# Patient Record
Sex: MALE | Race: ASIAN | Employment: FULL TIME | URBAN - METROPOLITAN AREA
[De-identification: names, ages, dates, MRNs, and addresses within clinical notes are randomized per-mention and may not be internally consistent; named-entity substitution may affect disease eponyms.]

---

## 2018-05-08 PROBLEM — R73.01 IMPAIRED FASTING GLUCOSE: Status: ACTIVE | Noted: 2018-05-08

## 2018-05-08 PROBLEM — I10 ESSENTIAL HYPERTENSION: Status: ACTIVE | Noted: 2018-05-08

## 2018-05-08 PROBLEM — E66.3 OVERWEIGHT (BMI 25.0-29.9): Status: ACTIVE | Noted: 2018-05-08

## 2018-05-08 PROBLEM — E78.2 MIXED HYPERLIPIDEMIA: Status: ACTIVE | Noted: 2018-05-08

## 2018-05-08 PROBLEM — I49.1 PAC (PREMATURE ATRIAL CONTRACTION): Status: ACTIVE | Noted: 2018-05-08

## 2020-07-23 ENCOUNTER — HOSPITAL ENCOUNTER (OUTPATIENT)
Dept: LAB | Age: 65
Discharge: HOME | End: 2020-07-23

## 2020-07-23 PROCEDURE — 88312 SPECIAL STAINS GROUP 1: CPT

## 2020-07-23 PROCEDURE — 88305 TISSUE EXAM BY PATHOLOGIST: CPT

## 2020-08-12 PROBLEM — K26.9 DUODENAL ULCER: Status: ACTIVE | Noted: 2020-08-12

## 2020-08-12 PROBLEM — K22.70 BARRETT'S ESOPHAGUS WITHOUT DYSPLASIA: Status: ACTIVE | Noted: 2020-08-12

## 2020-08-17 ENCOUNTER — HOSPITAL ENCOUNTER (OUTPATIENT)
Dept: CT IMAGING | Age: 65
Discharge: HOME | End: 2020-08-17
Attending: INTERNAL MEDICINE

## 2020-08-17 DIAGNOSIS — R10.10 PAIN OF UPPER ABDOMEN: ICD-10-CM

## 2020-08-17 RX ORDER — SODIUM CHLORIDE 0.9 % (FLUSH) 0.9 %
10 SYRINGE (ML) INJECTION
Status: COMPLETED | OUTPATIENT
Start: 2020-08-17 | End: 2020-08-17

## 2020-08-17 RX ADMIN — Medication 10 ML: at 14:17

## 2020-08-17 NOTE — PROGRESS NOTES
I spoke with Mr Tova Mcgregor and discussed the lesion in head of pancreas and the areas in liver also-he sees Dr Porsche Marie this Thursday for follow up of the ulcer, but this will now take precedence. He likely will get endoscopic US to biopsy the lesion and this was discussed-will fax the CT to Dr Porsche Marie and speak to his nurse or him about the findings. Also spoke of likely need for oncology involvement.

## 2020-08-24 ENCOUNTER — HOSPITAL ENCOUNTER (OUTPATIENT)
Dept: LAB | Age: 65
Discharge: HOME | End: 2020-08-24
Payer: COMMERCIAL

## 2020-08-24 PROCEDURE — 87635 SARS-COV-2 COVID-19 AMP PRB: CPT

## 2020-08-24 NOTE — PROGRESS NOTES
Pt contacted to ask if he received COVID swab pre-procedure. Pt unable to talk at this time, wife able to take message. Wife stated she could bring  to [facility] patient discharge to receive COVID swab at [phone].

## 2020-08-25 ENCOUNTER — ANESTHESIA EVENT (OUTPATIENT)
Dept: ENDOSCOPY | Age: 65
End: 2020-08-25
Payer: COMMERCIAL

## 2020-08-25 ENCOUNTER — HOSPITAL ENCOUNTER (OUTPATIENT)
Age: 65
Setting detail: OUTPATIENT SURGERY
Discharge: HOME | End: 2020-08-25
Attending: INTERNAL MEDICINE | Admitting: INTERNAL MEDICINE
Payer: COMMERCIAL

## 2020-08-25 ENCOUNTER — ANESTHESIA (OUTPATIENT)
Dept: ENDOSCOPY | Age: 65
End: 2020-08-25
Payer: COMMERCIAL

## 2020-08-25 VITALS
RESPIRATION RATE: 14 BRPM | TEMPERATURE: 97.7 F | BODY MASS INDEX: 24.91 KG/M2 | WEIGHT: 155 LBS | DIASTOLIC BLOOD PRESSURE: 72 MMHG | OXYGEN SATURATION: 100 % | HEIGHT: 66 IN | HEART RATE: 70 BPM | SYSTOLIC BLOOD PRESSURE: 162 MMHG

## 2020-08-25 LAB
COVID-19 RAPID TEST, COVR: NOT DETECTED
SARS COV-2, XPGCVT: NEGATIVE
SOURCE, COVRS: NORMAL

## 2020-08-25 PROCEDURE — 76040000026: Performed by: INTERNAL MEDICINE

## 2020-08-25 PROCEDURE — 88307 TISSUE EXAM BY PATHOLOGIST: CPT

## 2020-08-25 PROCEDURE — 76060000032 HC ANESTHESIA 0.5 TO 1 HR: Performed by: INTERNAL MEDICINE

## 2020-08-25 PROCEDURE — 77030008969: Performed by: INTERNAL MEDICINE

## 2020-08-25 PROCEDURE — 77030021593 HC FCPS BIOP ENDOSC BSC -A: Performed by: INTERNAL MEDICINE

## 2020-08-25 PROCEDURE — 74011000250 HC RX REV CODE- 250: Performed by: NURSE ANESTHETIST, CERTIFIED REGISTERED

## 2020-08-25 PROCEDURE — 77030028690 HC NDL ASPIR ULTRSND BSC -E: Performed by: INTERNAL MEDICINE

## 2020-08-25 PROCEDURE — 74011250636 HC RX REV CODE- 250/636: Performed by: ANESTHESIOLOGY

## 2020-08-25 PROCEDURE — 88305 TISSUE EXAM BY PATHOLOGIST: CPT

## 2020-08-25 PROCEDURE — 74011250636 HC RX REV CODE- 250/636: Performed by: NURSE ANESTHETIST, CERTIFIED REGISTERED

## 2020-08-25 RX ORDER — PROPOFOL 10 MG/ML
INJECTION, EMULSION INTRAVENOUS
Status: DISCONTINUED | OUTPATIENT
Start: 2020-08-25 | End: 2020-08-25 | Stop reason: HOSPADM

## 2020-08-25 RX ORDER — LIDOCAINE HYDROCHLORIDE 20 MG/ML
INJECTION, SOLUTION EPIDURAL; INFILTRATION; INTRACAUDAL; PERINEURAL AS NEEDED
Status: DISCONTINUED | OUTPATIENT
Start: 2020-08-25 | End: 2020-08-25 | Stop reason: HOSPADM

## 2020-08-25 RX ORDER — SODIUM CHLORIDE 0.9 % (FLUSH) 0.9 %
5-40 SYRINGE (ML) INJECTION EVERY 8 HOURS
Status: DISCONTINUED | OUTPATIENT
Start: 2020-08-25 | End: 2020-08-25 | Stop reason: HOSPADM

## 2020-08-25 RX ORDER — SODIUM CHLORIDE, SODIUM LACTATE, POTASSIUM CHLORIDE, CALCIUM CHLORIDE 600; 310; 30; 20 MG/100ML; MG/100ML; MG/100ML; MG/100ML
100 INJECTION, SOLUTION INTRAVENOUS CONTINUOUS
Status: DISCONTINUED | OUTPATIENT
Start: 2020-08-25 | End: 2020-08-25 | Stop reason: HOSPADM

## 2020-08-25 RX ORDER — SODIUM CHLORIDE 0.9 % (FLUSH) 0.9 %
5-40 SYRINGE (ML) INJECTION AS NEEDED
Status: DISCONTINUED | OUTPATIENT
Start: 2020-08-25 | End: 2020-08-25 | Stop reason: HOSPADM

## 2020-08-25 RX ORDER — PROPOFOL 10 MG/ML
INJECTION, EMULSION INTRAVENOUS AS NEEDED
Status: DISCONTINUED | OUTPATIENT
Start: 2020-08-25 | End: 2020-08-25 | Stop reason: HOSPADM

## 2020-08-25 RX ADMIN — PHENYLEPHRINE HYDROCHLORIDE 50 MCG: 10 INJECTION INTRAVENOUS at 15:39

## 2020-08-25 RX ADMIN — PROPOFOL 50 MG: 10 INJECTION, EMULSION INTRAVENOUS at 15:02

## 2020-08-25 RX ADMIN — PROPOFOL 100 MCG/KG/MIN: 10 INJECTION, EMULSION INTRAVENOUS at 15:02

## 2020-08-25 RX ADMIN — SODIUM CHLORIDE, SODIUM LACTATE, POTASSIUM CHLORIDE, AND CALCIUM CHLORIDE 100 ML/HR: 600; 310; 30; 20 INJECTION, SOLUTION INTRAVENOUS at 14:00

## 2020-08-25 RX ADMIN — LIDOCAINE HYDROCHLORIDE 60 MG: 20 INJECTION, SOLUTION EPIDURAL; INFILTRATION; INTRACAUDAL; PERINEURAL at 15:02

## 2020-08-25 RX ADMIN — PROPOFOL 50 MG: 10 INJECTION, EMULSION INTRAVENOUS at 15:08

## 2020-08-25 NOTE — ROUTINE PROCESS
VSS. No complaints noted. Education given and reviewed with wife who voiced understanding. Pt wheeled out via wheelchair by Keanu Colon.

## 2020-08-25 NOTE — H&P
Gastroenterology Outpatient History and Physical    Patient: Kirstie Hugo    Physician: Charles Pryor MD    Chief Complaint: Pancreatic mass    History of Present Illness: Epigastric pain    Justification for Procedure: As above    History:  Past Medical History:   Diagnosis Date    Hernia       Past Surgical History:   Procedure Laterality Date    HX CATARACT REMOVAL Left 12/15/2017    HX HERNIA REPAIR  2007    HX ORTHOPAEDIC  1982    broken bone      Social History     Socioeconomic History    Marital status:      Spouse name: Not on file    Number of children: Not on file    Years of education: Not on file    Highest education level: Not on file   Tobacco Use    Smoking status: Former Smoker     Packs/day: 1.00     Years: 10.00     Pack years: 10.00     Last attempt to quit: 2006     Years since quittin.6    Smokeless tobacco: Never Used   Substance and Sexual Activity    Alcohol use: Yes     Comment: occas      Family History   Problem Relation Age of Onset    No Known Problems Mother     Cancer Father 72         leukemia    Diabetes Sister     No Known Problems Brother     No Known Problems Sister     Cancer Sister        Allergies: Allergies   Allergen Reactions    Tetanus And Diphtheria Toxoids Swelling     As child       Medications:   Prior to Admission medications    Medication Sig Start Date End Date Taking? Authorizing Provider   lisinopriL (PRINIVIL, ZESTRIL) 10 mg tablet Take 1 Tab by mouth daily. 20  Yes Nadine Treadwell MD   sucralfate (CARAFATE) 1 gram tablet Take 1 g by mouth four (4) times daily. 20  Yes Provider, Historical   pantoprazole (PROTONIX) 40 mg tablet Take 40 mg by mouth two (2) times a day. 20  Yes Provider, Historical   vit B Cmplx 3-FA-Vit C-Biotin (NEPHRO VERÓNICA RX) 1- mg-mg-mcg tablet Take 1 Tab by mouth daily.    Yes Provider, Historical   cholecalciferol (VITAMIN D3) 1,000 unit tablet Take 1,000 Units by mouth daily. Yes Provider, Historical       Vital Signs: Blood pressure 183/88, pulse 90, temperature 98.3 °F (36.8 °C), resp. rate 14, height 5' 6\" (1.676 m), weight 70.3 kg (155 lb), SpO2 100 %.     Physical Exam:   General: alert      Heart: regular rate and rhythm   Lungs: no tachypnea, retractions or cyanosis, Heart exam - S1, S2 normal, no murmur, no gallop, rate regular   Abdominal: Bowel sounds are normal, soft, non distended             Plan of Care/Planned Procedure: EUS    Signed:  Carie Cleveland MD 8/25/2020

## 2020-08-25 NOTE — ANESTHESIA PREPROCEDURE EVALUATION
Relevant Problems   No relevant active problems       Anesthetic History   No history of anesthetic complications            Review of Systems / Medical History  Patient summary reviewed and pertinent labs reviewed    Pulmonary              Pertinent negatives: No smoker (quit 2006)     Neuro/Psych              Cardiovascular    Hypertension              Exercise tolerance: >4 METS  Comments: occ PACs   GI/Hepatic/Renal               Comments: Abdominal pain Endo/Other             Other Findings              Physical Exam    Airway  Mallampati: I  TM Distance: 4 - 6 cm  Neck ROM: normal range of motion   Mouth opening: Normal     Cardiovascular    Rhythm: regular  Rate: normal         Dental    Dentition: Poor dentition     Pulmonary  Breath sounds clear to auscultation               Abdominal         Other Findings            Anesthetic Plan    ASA: 2  Anesthesia type: total IV anesthesia          Induction: Intravenous  Anesthetic plan and risks discussed with: Patient and Spouse

## 2020-08-25 NOTE — PROCEDURES
ENDOSCOPIC ULTRASOUND PROCEDURE NOTE    DATE OF PROCEDURE: 8/25/2020    PRE-OP DIAGNOSIS:  Pancreatic mass  Epigastric abdominal pain   Weight loss     POST-OP DIAGNOSIS:  Heterogeneous mass involving the head of the pancreas and the duodenum. T3N1M1 if pancreatic cancer confirmed    MEDICATIONS ADMINISTERED: MAC    INSTRUMENT: GFUC T180    PROCEDURE:  After obtaining informed consent, the patient was placed in the left lateral position and sedated. The echoendoscope was advanced to the upper GI tract without difficulty. The scope was slowly removed while detailed images of the adjacent organs were obtained. The patient was taken to the recovery area in stable condition. FINDINGS:  ESOPHAGUS: Not well visualized  STOMACH: Limited views with the oblique-viewing echoendoscope were unremarkable  DUODENUM: There is a friable ulcerated mass in the distal bulb. This involves more than 3/4 of the circumference of the duodenum. It creates is tight stricture, and the large caliber EUS scope is not able to pass through. The appearance suggests malignancy, primary duodenal versus extension of a pancreatic head cancer. Multiple biopsies obtained with cold forceps. PANCREAS: The pancreas was well visualized In the body and tail, but only limited views of the head of the pancreas are available from the gastric antrum and pyloric channel. The scope cannot be advanced further into the duodenum because of the stricture. The ampulla Cannot be visualized. There is a heterogeneous hypoechoic mass with irregular outer margins involving the head of the pancreas. It measures 41 mm in cross section. There is a prominent central prefer echoic area. It is inseparable from the duodenal wall. It does not directly involve the portal vein or celiac axis. The SMA cannot be fully visualized. FNA was performed from a transgastric approach at the pylorus.  2 passes were made and a large amount of tissue was obtained using a RADHA Roberts 1 scientific acquire 22-gauge needle. Sent in formalin. BILIARY SYSTEM:  The gallbladder is normal. Common bile duct is not well visualized from the stomach. OTHER ORGANS: There was no ascites in the upper abdomen. Limited views of the left lobe of the liver demonstrated Multiple prominent solid mass lesions, Consistent with metastases. There is also a large simple cyst near the central portion of the liver. The solid metastases were targeted for FNA using a separate acquire 22-gauge needle. The largest mass visible was 16 mm, round, hypoechoic with a slightly hyperechoic center, typical of metastatic disease. Tissue was sent in formalin. The celiac axis appears normal.  There was significant portal and lai-pancreatic lymphadenopathy. A lymph node adjacent to the head of the pancreas measures 18 x 16 mm. It is oval, homogeneous, with well defined margins. The appearance is atypical for metastatic involvement.      Estimated blood loss: 0-minimal     PLAN:  Follow up mucosal biopsy and FNA results To help differentiate pancreatic versus duodenal primary   No NSAIDs for 48 hours   Oncology evaluation     Selene Mcguire MD  [facility]

## 2020-08-25 NOTE — DISCHARGE INSTRUCTIONS
Gastrointestinal Esophagogastroduodenoscopy (EGD/EUS) - Upper Exam Discharge Instructions    1. Call Dr. Saini Mems at [phone] for any problems or questions. 2. Contact the doctor's office for follow up appointment as directed. 3. Medication may cause drowsiness for several hours, therefore:  · Do not drive or operate machinery for remainder of the day. · No alcohol today. · Do not make any important or legal decisions for 24 hours. · Do not sign any legal documents for 24 hours. 5. Ordinarily, you may resume regular diet and activity after exam unless otherwise specified by your physician. 6. For mild soreness in your throat you may use Cepacol throat lozenges or warm salt-water gargles as needed. Any additional instructions:   PLAN:  Follow up biopsy results (office to call with results)  No NSAIDs (advil, aleve, ibuprofen, etc.) for 48 hours   Oncology evaluation      Instructions given to [employer] and other family members.

## 2020-08-26 NOTE — ANESTHESIA POSTPROCEDURE EVALUATION
Procedure(s):  ENDOSCOPIC ULTRASOUND (EUS)/ BMI 25 Rapid resulted  FINE NEEDLE ASPIRATION  ESOPHAGOGASTRODUODENAL (EGD) BIOPSY. total IV anesthesia    Anesthesia Post Evaluation        Patient location during evaluation: PACU  Patient participation: complete - patient participated  Level of consciousness: awake  Pain management: adequate  Airway patency: patent  Anesthetic complications: no  Cardiovascular status: acceptable  Respiratory status: acceptable  Hydration status: acceptable  Post anesthesia nausea and vomiting:  none      INITIAL Post-op Vital signs:   Vitals Value Taken Time   /72 8/25/2020  4:32 PM   Temp 36.5 °C (97.7 °F) 8/25/2020  3:55 PM   Pulse 73 8/25/2020  4:41 PM   Resp 14 8/25/2020  4:32 PM   SpO2 100 % 8/25/2020  4:41 PM   Vitals shown include unvalidated device data.

## 2020-08-31 ENCOUNTER — HOSPITAL ENCOUNTER (OUTPATIENT)
Dept: LAB | Age: 65
Discharge: HOME | End: 2020-08-31
Payer: COMMERCIAL

## 2020-08-31 ENCOUNTER — PATIENT OUTREACH (OUTPATIENT)
Dept: CASE MANAGEMENT | Age: 65
End: 2020-08-31

## 2020-08-31 DIAGNOSIS — K86.89 PANCREATIC MASS: ICD-10-CM

## 2020-08-31 DIAGNOSIS — C25.0 MALIGNANT NEOPLASM OF HEAD OF PANCREAS (HCC): ICD-10-CM

## 2020-08-31 DIAGNOSIS — Z01.812 PRE-PROCEDURAL LABORATORY EXAMINATION: ICD-10-CM

## 2020-08-31 LAB
ALBUMIN SERPL-MCNC: 3.4 G/DL (ref 3.2–4.6)
ALBUMIN/GLOB SERPL: 0.8 {RATIO} (ref 1.2–3.5)
ALP SERPL-CCNC: 92 U/L (ref 50–136)
ALT SERPL-CCNC: 19 U/L (ref 12–65)
ANION GAP SERPL CALC-SCNC: 5 MMOL/L (ref 7–16)
APTT PPP: 33.2 SEC (ref 24.3–35.4)
AST SERPL-CCNC: 15 U/L (ref 15–37)
BASOPHILS # BLD: 0.1 K/UL (ref 0–0.2)
BASOPHILS NFR BLD: 1 % (ref 0–2)
BILIRUB SERPL-MCNC: 0.3 MG/DL (ref 0.2–1.1)
BUN SERPL-MCNC: 7 MG/DL (ref 8–23)
CALCIUM SERPL-MCNC: 9.6 MG/DL (ref 8.3–10.4)
CANCER AG19-9 SERPL-ACNC: ABNORMAL U/ML (ref 2–37)
CHLORIDE SERPL-SCNC: 104 MMOL/L (ref 98–107)
CO2 SERPL-SCNC: 27 MMOL/L (ref 21–32)
CREAT SERPL-MCNC: 1.1 MG/DL (ref 0.8–1.5)
DIFFERENTIAL METHOD BLD: ABNORMAL
EOSINOPHIL # BLD: 1 K/UL (ref 0–0.8)
EOSINOPHIL NFR BLD: 8 % (ref 0.5–7.8)
ERYTHROCYTE [DISTWIDTH] IN BLOOD BY AUTOMATED COUNT: 14.7 % (ref 11.9–14.6)
GLOBULIN SER CALC-MCNC: 4.2 G/DL (ref 2.3–3.5)
GLUCOSE SERPL-MCNC: 96 MG/DL (ref 65–100)
HCT VFR BLD AUTO: 29.2 % (ref 41.1–50.3)
HGB BLD-MCNC: 9 G/DL (ref 13.6–17.2)
IMM GRANULOCYTES # BLD AUTO: 0.1 K/UL (ref 0–0.5)
IMM GRANULOCYTES NFR BLD AUTO: 1 % (ref 0–5)
INR PPP: 1.1
LYMPHOCYTES # BLD: 0.8 K/UL (ref 0.5–4.6)
LYMPHOCYTES NFR BLD: 6 % (ref 13–44)
MCH RBC QN AUTO: 26 PG (ref 26.1–32.9)
MCHC RBC AUTO-ENTMCNC: 30.8 G/DL (ref 31.4–35)
MCV RBC AUTO: 84.4 FL (ref 79.6–97.8)
MONOCYTES # BLD: 1.2 K/UL (ref 0.1–1.3)
MONOCYTES NFR BLD: 10 % (ref 4–12)
NEUTS SEG # BLD: 9.4 K/UL (ref 1.7–8.2)
NEUTS SEG NFR BLD: 75 % (ref 43–78)
NRBC # BLD: 0 K/UL (ref 0–0.2)
PLATELET # BLD AUTO: 401 K/UL (ref 150–450)
PMV BLD AUTO: 9.3 FL (ref 9.4–12.3)
POTASSIUM SERPL-SCNC: 4 MMOL/L (ref 3.5–5.1)
PROT SERPL-MCNC: 7.6 G/DL (ref 6.3–8.2)
PROTHROMBIN TIME: 14.2 SEC (ref 12–14.7)
RBC # BLD AUTO: 3.46 M/UL (ref 4.23–5.67)
SODIUM SERPL-SCNC: 136 MMOL/L (ref 136–145)
WBC # BLD AUTO: 12.5 K/UL (ref 4.3–11.1)

## 2020-08-31 PROCEDURE — 85610 PROTHROMBIN TIME: CPT

## 2020-08-31 PROCEDURE — 86301 IMMUNOASSAY TUMOR CA 19-9: CPT

## 2020-08-31 PROCEDURE — 83540 ASSAY OF IRON: CPT

## 2020-08-31 PROCEDURE — 85025 COMPLETE CBC W/AUTO DIFF WBC: CPT

## 2020-08-31 PROCEDURE — 85730 THROMBOPLASTIN TIME PARTIAL: CPT

## 2020-08-31 PROCEDURE — 82728 ASSAY OF FERRITIN: CPT

## 2020-08-31 PROCEDURE — 80053 COMPREHEN METABOLIC PANEL: CPT

## 2020-08-31 PROCEDURE — 36415 COLL VENOUS BLD VENIPUNCTURE: CPT

## 2020-08-31 NOTE — PROGRESS NOTES
8/31/20 saw pt today with Dr. Rufino Watkins for initial medical oncology consult for pancreatic cancer. His wife is here with him today. Imaging reviewed. NGS Caris testing will be ordered. CT chest to complete initial work up. Recommendation is systemic treatment, will arrange port placement. Port education book reviewed. Rx sent to pharmacy and explained to pt. Ultram controlling pain. Labs today. Provided opportunity to ask questions and all were discussed. My contact information was provided and I encouraged him to call with any questions or concerns. Navigation will continue to follow.

## 2020-09-01 ENCOUNTER — HOSPITAL ENCOUNTER (OUTPATIENT)
Dept: SURGERY | Age: 65
Discharge: HOME | End: 2020-09-01

## 2020-09-01 PROBLEM — D50.9 IRON DEFICIENCY ANEMIA: Status: ACTIVE | Noted: 2020-09-01

## 2020-09-01 LAB
FERRITIN SERPL-MCNC: 20 NG/ML (ref 8–388)
IRON SATN MFR SERPL: 3 %
IRON SERPL-MCNC: 12 UG/DL (ref 35–150)
TIBC SERPL-MCNC: 367 UG/DL (ref 250–450)

## 2020-09-02 ENCOUNTER — APPOINTMENT (OUTPATIENT)
Dept: GENERAL RADIOLOGY | Age: 65
DRG: 357 | End: 2020-09-02
Attending: EMERGENCY MEDICINE
Payer: COMMERCIAL

## 2020-09-02 ENCOUNTER — HOSPITAL ENCOUNTER (INPATIENT)
Age: 65
LOS: 4 days | Discharge: HOME | DRG: 357 | End: 2020-09-06
Attending: EMERGENCY MEDICINE | Admitting: INTERNAL MEDICINE
Payer: COMMERCIAL

## 2020-09-02 VITALS — WEIGHT: 155 LBS | HEIGHT: 66 IN | BODY MASS INDEX: 24.91 KG/M2

## 2020-09-02 DIAGNOSIS — C25.0 MALIGNANT NEOPLASM OF HEAD OF PANCREAS (HCC): ICD-10-CM

## 2020-09-02 DIAGNOSIS — K31.89 DUODENAL MASS: ICD-10-CM

## 2020-09-02 DIAGNOSIS — K92.0 GASTROINTESTINAL HEMORRHAGE WITH HEMATEMESIS: ICD-10-CM

## 2020-09-02 DIAGNOSIS — D64.9 SYMPTOMATIC ANEMIA: ICD-10-CM

## 2020-09-02 DIAGNOSIS — K92.2 ACUTE UPPER GI BLEED: Primary | ICD-10-CM

## 2020-09-02 PROBLEM — I95.9 HYPOTENSION: Status: ACTIVE | Noted: 2020-09-02

## 2020-09-02 PROBLEM — C25.9 PANCREATIC CANCER (HCC): Status: ACTIVE | Noted: 2020-09-02

## 2020-09-02 PROBLEM — D62 ACUTE BLOOD LOSS ANEMIA: Status: ACTIVE | Noted: 2020-09-02

## 2020-09-02 LAB
ALBUMIN SERPL-MCNC: 2.5 G/DL (ref 3.2–4.6)
ALBUMIN/GLOB SERPL: 0.8 {RATIO} (ref 1.2–3.5)
ALP SERPL-CCNC: 71 U/L (ref 50–136)
ALT SERPL-CCNC: 13 U/L (ref 12–65)
ANION GAP SERPL CALC-SCNC: 13 MMOL/L (ref 7–16)
AST SERPL-CCNC: 19 U/L (ref 15–37)
BASOPHILS # BLD: 0.1 K/UL (ref 0–0.2)
BASOPHILS NFR BLD: 1 % (ref 0–2)
BILIRUB SERPL-MCNC: 0.2 MG/DL (ref 0.2–1.1)
BUN SERPL-MCNC: 15 MG/DL (ref 8–23)
CALCIUM SERPL-MCNC: 8.4 MG/DL (ref 8.3–10.4)
CHLORIDE SERPL-SCNC: 104 MMOL/L (ref 98–107)
CO2 SERPL-SCNC: 22 MMOL/L (ref 21–32)
CREAT SERPL-MCNC: 1.21 MG/DL (ref 0.8–1.5)
DIFFERENTIAL METHOD BLD: ABNORMAL
EOSINOPHIL # BLD: 1.4 K/UL (ref 0–0.8)
EOSINOPHIL NFR BLD: 7 % (ref 0.5–7.8)
ERYTHROCYTE [DISTWIDTH] IN BLOOD BY AUTOMATED COUNT: 15.4 % (ref 11.9–14.6)
GLOBULIN SER CALC-MCNC: 3.2 G/DL (ref 2.3–3.5)
GLUCOSE SERPL-MCNC: 187 MG/DL (ref 65–100)
HCT VFR BLD AUTO: 20.6 % (ref 41.1–50.3)
HGB BLD-MCNC: 6.3 G/DL (ref 13.6–17.2)
IMM GRANULOCYTES # BLD AUTO: 0.3 K/UL (ref 0–0.5)
IMM GRANULOCYTES NFR BLD AUTO: 1 % (ref 0–5)
INR PPP: 1.2
LIPASE SERPL-CCNC: 504 U/L (ref 73–393)
LYMPHOCYTES # BLD: 1.2 K/UL (ref 0.5–4.6)
LYMPHOCYTES NFR BLD: 6 % (ref 13–44)
MAGNESIUM SERPL-MCNC: 1.9 MG/DL (ref 1.8–2.4)
MCH RBC QN AUTO: 26 PG (ref 26.1–32.9)
MCHC RBC AUTO-ENTMCNC: 30.6 G/DL (ref 31.4–35)
MCV RBC AUTO: 85.1 FL (ref 79.6–97.8)
MONOCYTES # BLD: 1.6 K/UL (ref 0.1–1.3)
MONOCYTES NFR BLD: 9 % (ref 4–12)
NEUTS SEG # BLD: 14.8 K/UL (ref 1.7–8.2)
NEUTS SEG NFR BLD: 76 % (ref 43–78)
NRBC # BLD: 0 K/UL (ref 0–0.2)
PLATELET # BLD AUTO: 366 K/UL (ref 150–450)
PMV BLD AUTO: 10.2 FL (ref 9.4–12.3)
POTASSIUM SERPL-SCNC: 3.9 MMOL/L (ref 3.5–5.1)
PROT SERPL-MCNC: 5.7 G/DL (ref 6.3–8.2)
PROTHROMBIN TIME: 15.1 SEC (ref 12–14.7)
RBC # BLD AUTO: 2.42 M/UL (ref 4.23–5.6)
SODIUM SERPL-SCNC: 139 MMOL/L (ref 136–145)
WBC # BLD AUTO: 19.3 K/UL (ref 4.3–11.1)

## 2020-09-02 PROCEDURE — 85025 COMPLETE CBC W/AUTO DIFF WBC: CPT

## 2020-09-02 PROCEDURE — 83735 ASSAY OF MAGNESIUM: CPT

## 2020-09-02 PROCEDURE — 96376 TX/PRO/DX INJ SAME DRUG ADON: CPT

## 2020-09-02 PROCEDURE — 86900 BLOOD TYPING SEROLOGIC ABO: CPT

## 2020-09-02 PROCEDURE — P9016 RBC LEUKOCYTES REDUCED: HCPCS

## 2020-09-02 PROCEDURE — 74011000250 HC RX REV CODE- 250: Performed by: EMERGENCY MEDICINE

## 2020-09-02 PROCEDURE — C9113 INJ PANTOPRAZOLE SODIUM, VIA: HCPCS | Performed by: EMERGENCY MEDICINE

## 2020-09-02 PROCEDURE — 65610000006 HC RM INTENSIVE CARE

## 2020-09-02 PROCEDURE — 83690 ASSAY OF LIPASE: CPT

## 2020-09-02 PROCEDURE — 96361 HYDRATE IV INFUSION ADD-ON: CPT

## 2020-09-02 PROCEDURE — 96375 TX/PRO/DX INJ NEW DRUG ADDON: CPT

## 2020-09-02 PROCEDURE — 86923 COMPATIBILITY TEST ELECTRIC: CPT

## 2020-09-02 PROCEDURE — 74011250636 HC RX REV CODE- 250/636: Performed by: EMERGENCY MEDICINE

## 2020-09-02 PROCEDURE — 96374 THER/PROPH/DIAG INJ IV PUSH: CPT

## 2020-09-02 PROCEDURE — 99285 EMERGENCY DEPT VISIT HI MDM: CPT

## 2020-09-02 PROCEDURE — 36430 TRANSFUSION BLD/BLD COMPNT: CPT

## 2020-09-02 PROCEDURE — 80053 COMPREHEN METABOLIC PANEL: CPT

## 2020-09-02 PROCEDURE — 85610 PROTHROMBIN TIME: CPT

## 2020-09-02 RX ORDER — ONDANSETRON 2 MG/ML
8 INJECTION INTRAMUSCULAR; INTRAVENOUS
Status: COMPLETED | OUTPATIENT
Start: 2020-09-02 | End: 2020-09-02

## 2020-09-02 RX ORDER — FENTANYL CITRATE 50 UG/ML
50 INJECTION, SOLUTION INTRAMUSCULAR; INTRAVENOUS ONCE
Status: COMPLETED | OUTPATIENT
Start: 2020-09-02 | End: 2020-09-02

## 2020-09-02 RX ORDER — OCTREOTIDE ACETATE 100 UG/ML
50 INJECTION, SOLUTION INTRAVENOUS; SUBCUTANEOUS
Status: COMPLETED | OUTPATIENT
Start: 2020-09-02 | End: 2020-09-02

## 2020-09-02 RX ORDER — SODIUM CHLORIDE 9 MG/ML
250 INJECTION, SOLUTION INTRAVENOUS AS NEEDED
Status: DISCONTINUED | OUTPATIENT
Start: 2020-09-02 | End: 2020-09-06 | Stop reason: HOSPADM

## 2020-09-02 RX ADMIN — SODIUM CHLORIDE 1000 ML: 900 INJECTION, SOLUTION INTRAVENOUS at 19:19

## 2020-09-02 RX ADMIN — ONDANSETRON 8 MG: 2 INJECTION INTRAMUSCULAR; INTRAVENOUS at 19:19

## 2020-09-02 RX ADMIN — OCTREOTIDE ACETATE 50 MCG/HR: 500 INJECTION, SOLUTION INTRAVENOUS; SUBCUTANEOUS at 20:39

## 2020-09-02 RX ADMIN — FENTANYL CITRATE 50 MCG: 50 INJECTION INTRAMUSCULAR; INTRAVENOUS at 19:19

## 2020-09-02 RX ADMIN — SODIUM CHLORIDE 1000 ML: 900 INJECTION, SOLUTION INTRAVENOUS at 21:46

## 2020-09-02 RX ADMIN — SODIUM CHLORIDE 40 MG: 9 INJECTION, SOLUTION INTRAMUSCULAR; INTRAVENOUS; SUBCUTANEOUS at 19:26

## 2020-09-02 RX ADMIN — OCTREOTIDE ACETATE 50 MCG: 100 INJECTION, SOLUTION INTRAVENOUS; SUBCUTANEOUS at 20:38

## 2020-09-02 NOTE — PERIOP NOTES
Patient verified name and . Order for consent not found in EHR at time of patand matches case posting; patient verifies procedure. Type 1b surgery, pat assessment complete. Orders not received. Labs per surgeon: none  Labs per anesthesia protocol: none      Patient answered medical/surgical history questions at their best of ability. All prior to admission medications documented in [facility]. Patient instructed to take the following medications the day of surgery according to anesthesia guidelines with a small sip of water:protonix Hold all vitamins 7 days prior to surgery and NSAIDS 5 days prior to surgery. Prescription meds to hold:all supplements    Patient instructed on the following:    > a negative Covid swab result is required to proceed with surgery;  appointments are made by the surgeon office and test should be collected 7 days prior to surgery. The testing center is located at the [facility] [address]. MAC anesthesia  > 1 visitor allowed at this time. >Arrive at outpatient-2cnd floor-radiology Entrance, time of arrival to be called the day before by 1700  >NPO after midnight including gum, mints, and ice chips  >Responsible adult must drive patient to the hospital, stay during surgery, and patient will need supervision 24 hours after anesthesia  >Use dial soap in shower the night before surgery and on the morning of surgery  >All piercings must be removed prior to arrival.    >Leave all valuables (money and jewelry) at home but bring insurance card and ID on       DOS. >Do not wear make-up, nail polish, lotions, cologne, perfumes, powders, or oil on skin.

## 2020-09-02 NOTE — ED TRIAGE NOTES
Pt arrives via ems from home. Reports tarry black stool this morning and one episode of bloody vomit. Reports llq pain for 3 months and recent dx of pancreatic cx. Hypotensive with ems, received 250 cc ns. Pt c/o weakness and abdomen pain.

## 2020-09-03 ENCOUNTER — APPOINTMENT (OUTPATIENT)
Dept: CT IMAGING | Age: 65
DRG: 357 | End: 2020-09-03
Attending: INTERNAL MEDICINE
Payer: COMMERCIAL

## 2020-09-03 ENCOUNTER — APPOINTMENT (OUTPATIENT)
Dept: INTERVENTIONAL RADIOLOGY/VASCULAR | Age: 65
DRG: 357 | End: 2020-09-03
Attending: RADIOLOGY
Payer: COMMERCIAL

## 2020-09-03 LAB
ANION GAP SERPL CALC-SCNC: 6 MMOL/L (ref 7–16)
APPEARANCE UR: CLEAR
BILIRUB UR QL: NEGATIVE
BUN SERPL-MCNC: 17 MG/DL (ref 8–23)
CALCIUM SERPL-MCNC: 8.5 MG/DL (ref 8.3–10.4)
CHLORIDE SERPL-SCNC: 107 MMOL/L (ref 98–107)
CO2 SERPL-SCNC: 26 MMOL/L (ref 21–32)
COLOR UR: YELLOW
CREAT SERPL-MCNC: 1.11 MG/DL (ref 0.8–1.5)
GLUCOSE SERPL-MCNC: 136 MG/DL (ref 65–100)
GLUCOSE UR STRIP.AUTO-MCNC: NEGATIVE MG/DL
HCT VFR BLD AUTO: 22.1 % (ref 41.1–50.3)
HCT VFR BLD AUTO: 25.2 % (ref 41.1–50.3)
HCT VFR BLD AUTO: 27.1 % (ref 41.1–50.3)
HEMOCCULT STL QL: POSITIVE
HGB BLD-MCNC: 7.1 G/DL (ref 13.6–17.2)
HGB BLD-MCNC: 8.5 G/DL (ref 13.6–17.2)
HGB BLD-MCNC: 8.5 G/DL (ref 13.6–17.2)
HGB UR QL STRIP: NEGATIVE
KETONES UR QL STRIP.AUTO: 15 MG/DL
LEUKOCYTE ESTERASE UR QL STRIP.AUTO: NEGATIVE
NITRITE UR QL STRIP.AUTO: NEGATIVE
PH UR STRIP: 6 [PH] (ref 5–9)
POTASSIUM SERPL-SCNC: 4.7 MMOL/L (ref 3.5–5.1)
PROT UR STRIP-MCNC: NEGATIVE MG/DL
SODIUM SERPL-SCNC: 139 MMOL/L (ref 136–145)
SP GR UR REFRACTOMETRY: 1.02 (ref 1–1.02)
UROBILINOGEN UR QL STRIP.AUTO: 0.2 EU/DL (ref 0.2–1)

## 2020-09-03 PROCEDURE — 04L33DZ OCCLUSION OF HEPATIC ARTERY WITH INTRALUMINAL DEVICE, PERCUTANEOUS APPROACH: ICD-10-PCS | Performed by: RADIOLOGY

## 2020-09-03 PROCEDURE — C1769 GUIDE WIRE: HCPCS

## 2020-09-03 PROCEDURE — 77030022017 HC DRSG HEMO QCLOT ZMED -A

## 2020-09-03 PROCEDURE — 74011250636 HC RX REV CODE- 250/636: Performed by: NURSE PRACTITIONER

## 2020-09-03 PROCEDURE — C1887 CATHETER, GUIDING: HCPCS

## 2020-09-03 PROCEDURE — 74011250636 HC RX REV CODE- 250/636: Performed by: INTERNAL MEDICINE

## 2020-09-03 PROCEDURE — 36247 INS CATH ABD/L-EXT ART 3RD: CPT

## 2020-09-03 PROCEDURE — 74011250636 HC RX REV CODE- 250/636: Performed by: RADIOLOGY

## 2020-09-03 PROCEDURE — 76937 US GUIDE VASCULAR ACCESS: CPT

## 2020-09-03 PROCEDURE — 30233N1 TRANSFUSION OF NONAUTOLOGOUS RED BLOOD CELLS INTO PERIPHERAL VEIN, PERCUTANEOUS APPROACH: ICD-10-PCS | Performed by: EMERGENCY MEDICINE

## 2020-09-03 PROCEDURE — 74011000250 HC RX REV CODE- 250: Performed by: EMERGENCY MEDICINE

## 2020-09-03 PROCEDURE — 77030021532 HC CATH ANGI DX IMPRS MRTM -B

## 2020-09-03 PROCEDURE — C1760 CLOSURE DEV, VASC: HCPCS

## 2020-09-03 PROCEDURE — 71260 CT THORAX DX C+: CPT

## 2020-09-03 PROCEDURE — 75726 ARTERY X-RAYS ABDOMEN: CPT

## 2020-09-03 PROCEDURE — P9016 RBC LEUKOCYTES REDUCED: HCPCS

## 2020-09-03 PROCEDURE — 80048 BASIC METABOLIC PNL TOTAL CA: CPT

## 2020-09-03 PROCEDURE — 77030007176 HC COIL EMB INTLOK BSC -E

## 2020-09-03 PROCEDURE — C1894 INTRO/SHEATH, NON-LASER: HCPCS

## 2020-09-03 PROCEDURE — 74011000258 HC RX REV CODE- 258: Performed by: INTERNAL MEDICINE

## 2020-09-03 PROCEDURE — 77030034869 HC COIL INTLCK18 FBR IDC 2D BSC -F

## 2020-09-03 PROCEDURE — 77030004524 HC CATH ANGI DX FLS COOK -B

## 2020-09-03 PROCEDURE — B4141ZZ FLUOROSCOPY OF SUPERIOR MESENTERIC ARTERY USING LOW OSMOLAR CONTRAST: ICD-10-PCS | Performed by: RADIOLOGY

## 2020-09-03 PROCEDURE — 74011250636 HC RX REV CODE- 250/636: Performed by: EMERGENCY MEDICINE

## 2020-09-03 PROCEDURE — 65270000029 HC RM PRIVATE

## 2020-09-03 PROCEDURE — C9113 INJ PANTOPRAZOLE SODIUM, VIA: HCPCS | Performed by: EMERGENCY MEDICINE

## 2020-09-03 PROCEDURE — 74011000636 HC RX REV CODE- 636: Performed by: RADIOLOGY

## 2020-09-03 PROCEDURE — 04L53DZ OCCLUSION OF SUPERIOR MESENTERIC ARTERY WITH INTRALUMINAL DEVICE, PERCUTANEOUS APPROACH: ICD-10-PCS | Performed by: RADIOLOGY

## 2020-09-03 PROCEDURE — 85018 HEMOGLOBIN: CPT

## 2020-09-03 PROCEDURE — 74011000250 HC RX REV CODE- 250: Performed by: RADIOLOGY

## 2020-09-03 PROCEDURE — 77030010507 HC ADH SKN DERMBND J&J -B

## 2020-09-03 PROCEDURE — 82272 OCCULT BLD FECES 1-3 TESTS: CPT

## 2020-09-03 PROCEDURE — 36246 INS CATH ABD/L-EXT ART 2ND: CPT

## 2020-09-03 PROCEDURE — 74011000636 HC RX REV CODE- 636: Performed by: INTERNAL MEDICINE

## 2020-09-03 PROCEDURE — 36415 COLL VENOUS BLD VENIPUNCTURE: CPT

## 2020-09-03 PROCEDURE — 81003 URINALYSIS AUTO W/O SCOPE: CPT

## 2020-09-03 PROCEDURE — 36430 TRANSFUSION BLD/BLD COMPNT: CPT

## 2020-09-03 PROCEDURE — 74011000258 HC RX REV CODE- 258: Performed by: NURSE PRACTITIONER

## 2020-09-03 RX ORDER — DIPHENHYDRAMINE HYDROCHLORIDE 50 MG/ML
12.5-5 INJECTION, SOLUTION INTRAMUSCULAR; INTRAVENOUS ONCE
Status: COMPLETED | OUTPATIENT
Start: 2020-09-03 | End: 2020-09-03

## 2020-09-03 RX ORDER — MIDAZOLAM HYDROCHLORIDE 1 MG/ML
.5-2 INJECTION, SOLUTION INTRAMUSCULAR; INTRAVENOUS
Status: DISCONTINUED | OUTPATIENT
Start: 2020-09-03 | End: 2020-09-03

## 2020-09-03 RX ORDER — ONDANSETRON 2 MG/ML
4 INJECTION INTRAMUSCULAR; INTRAVENOUS
Status: DISCONTINUED | OUTPATIENT
Start: 2020-09-03 | End: 2020-09-06 | Stop reason: HOSPADM

## 2020-09-03 RX ORDER — ACETAMINOPHEN 650 MG/1
650 SUPPOSITORY RECTAL
Status: DISCONTINUED | OUTPATIENT
Start: 2020-09-03 | End: 2020-09-06 | Stop reason: HOSPADM

## 2020-09-03 RX ORDER — SODIUM CHLORIDE 9 MG/ML
25 INJECTION, SOLUTION INTRAVENOUS ONCE
Status: COMPLETED | OUTPATIENT
Start: 2020-09-03 | End: 2020-09-03

## 2020-09-03 RX ORDER — HYDROMORPHONE HYDROCHLORIDE 1 MG/ML
1 INJECTION, SOLUTION INTRAMUSCULAR; INTRAVENOUS; SUBCUTANEOUS
Status: DISCONTINUED | OUTPATIENT
Start: 2020-09-03 | End: 2020-09-06 | Stop reason: HOSPADM

## 2020-09-03 RX ORDER — ENALAPRILAT 1.25 MG/ML
1.25 INJECTION INTRAVENOUS
Status: DISCONTINUED | OUTPATIENT
Start: 2020-09-03 | End: 2020-09-06 | Stop reason: HOSPADM

## 2020-09-03 RX ORDER — LIDOCAINE HYDROCHLORIDE 20 MG/ML
0.2 INJECTION, SOLUTION INFILTRATION; PERINEURAL ONCE
Status: COMPLETED | OUTPATIENT
Start: 2020-09-03 | End: 2020-09-03

## 2020-09-03 RX ORDER — SODIUM CHLORIDE 9 MG/ML
75 INJECTION, SOLUTION INTRAVENOUS CONTINUOUS
Status: DISCONTINUED | OUTPATIENT
Start: 2020-09-03 | End: 2020-09-04

## 2020-09-03 RX ORDER — SODIUM CHLORIDE 0.9 % (FLUSH) 0.9 %
10 SYRINGE (ML) INJECTION
Status: COMPLETED | OUTPATIENT
Start: 2020-09-03 | End: 2020-09-03

## 2020-09-03 RX ORDER — FENTANYL CITRATE 50 UG/ML
25-50 INJECTION, SOLUTION INTRAMUSCULAR; INTRAVENOUS
Status: DISCONTINUED | OUTPATIENT
Start: 2020-09-03 | End: 2020-09-03

## 2020-09-03 RX ORDER — HEPARIN SODIUM 200 [USP'U]/100ML
1000-4000 INJECTION, SOLUTION INTRAVENOUS AS NEEDED
Status: DISCONTINUED | OUTPATIENT
Start: 2020-09-03 | End: 2020-09-03

## 2020-09-03 RX ADMIN — FENTANYL CITRATE 25 MCG: 50 INJECTION, SOLUTION INTRAMUSCULAR; INTRAVENOUS at 09:38

## 2020-09-03 RX ADMIN — FENTANYL CITRATE 25 MCG: 50 INJECTION, SOLUTION INTRAMUSCULAR; INTRAVENOUS at 08:49

## 2020-09-03 RX ADMIN — MIDAZOLAM 0.5 MG: 1 INJECTION INTRAMUSCULAR; INTRAVENOUS at 08:55

## 2020-09-03 RX ADMIN — Medication 10 ML: at 15:17

## 2020-09-03 RX ADMIN — MIDAZOLAM 0.5 MG: 1 INJECTION INTRAMUSCULAR; INTRAVENOUS at 09:31

## 2020-09-03 RX ADMIN — DIPHENHYDRAMINE HYDROCHLORIDE 50 MG: 50 INJECTION, SOLUTION INTRAMUSCULAR; INTRAVENOUS at 08:24

## 2020-09-03 RX ADMIN — FENTANYL CITRATE 25 MCG: 50 INJECTION, SOLUTION INTRAMUSCULAR; INTRAVENOUS at 08:34

## 2020-09-03 RX ADMIN — IOPAMIDOL 200 ML: 612 INJECTION, SOLUTION INTRAVENOUS at 09:54

## 2020-09-03 RX ADMIN — FENTANYL CITRATE 25 MCG: 50 INJECTION, SOLUTION INTRAMUSCULAR; INTRAVENOUS at 09:19

## 2020-09-03 RX ADMIN — HEPARIN SODIUM 2000 UNITS: 200 INJECTION, SOLUTION INTRAVENOUS at 08:47

## 2020-09-03 RX ADMIN — SODIUM CHLORIDE 25 MG: 900 INJECTION, SOLUTION INTRAVENOUS at 17:01

## 2020-09-03 RX ADMIN — OCTREOTIDE ACETATE 50 MCG/HR: 500 INJECTION, SOLUTION INTRAVENOUS; SUBCUTANEOUS at 21:00

## 2020-09-03 RX ADMIN — SODIUM CHLORIDE 25 ML/HR: 900 INJECTION, SOLUTION INTRAVENOUS at 08:20

## 2020-09-03 RX ADMIN — HEPARIN SODIUM 2000 UNITS: 200 INJECTION, SOLUTION INTRAVENOUS at 08:42

## 2020-09-03 RX ADMIN — MIDAZOLAM 0.5 MG: 1 INJECTION INTRAMUSCULAR; INTRAVENOUS at 09:49

## 2020-09-03 RX ADMIN — SODIUM CHLORIDE 1000 MG: 900 INJECTION, SOLUTION INTRAVENOUS at 19:24

## 2020-09-03 RX ADMIN — PANTOPRAZOLE SODIUM 80 MG: 40 INJECTION, POWDER, FOR SOLUTION INTRAVENOUS at 21:00

## 2020-09-03 RX ADMIN — MIDAZOLAM 0.5 MG: 1 INJECTION INTRAMUSCULAR; INTRAVENOUS at 08:35

## 2020-09-03 RX ADMIN — SODIUM CHLORIDE 100 ML: 900 INJECTION, SOLUTION INTRAVENOUS at 15:17

## 2020-09-03 RX ADMIN — FENTANYL CITRATE 25 MCG: 50 INJECTION, SOLUTION INTRAMUSCULAR; INTRAVENOUS at 09:31

## 2020-09-03 RX ADMIN — MIDAZOLAM 1 MG: 1 INJECTION INTRAMUSCULAR; INTRAVENOUS at 08:25

## 2020-09-03 RX ADMIN — FENTANYL CITRATE 50 MCG: 50 INJECTION, SOLUTION INTRAMUSCULAR; INTRAVENOUS at 08:25

## 2020-09-03 RX ADMIN — LIDOCAINE HYDROCHLORIDE 4 MG: 20 INJECTION, SOLUTION INFILTRATION; PERINEURAL at 08:32

## 2020-09-03 RX ADMIN — HEPARIN SODIUM 2000 UNITS: 200 INJECTION, SOLUTION INTRAVENOUS at 08:37

## 2020-09-03 RX ADMIN — MIDAZOLAM 0.5 MG: 1 INJECTION INTRAMUSCULAR; INTRAVENOUS at 09:19

## 2020-09-03 RX ADMIN — MIDAZOLAM 0.5 MG: 1 INJECTION INTRAMUSCULAR; INTRAVENOUS at 09:12

## 2020-09-03 RX ADMIN — HEPARIN SODIUM 2000 UNITS: 200 INJECTION, SOLUTION INTRAVENOUS at 08:32

## 2020-09-03 RX ADMIN — HYDROMORPHONE HYDROCHLORIDE 1 MG: 1 INJECTION, SOLUTION INTRAMUSCULAR; INTRAVENOUS; SUBCUTANEOUS at 21:01

## 2020-09-03 RX ADMIN — MIDAZOLAM 0.5 MG: 1 INJECTION INTRAMUSCULAR; INTRAVENOUS at 08:39

## 2020-09-03 RX ADMIN — PANTOPRAZOLE SODIUM 80 MG: 40 INJECTION, POWDER, FOR SOLUTION INTRAVENOUS at 13:15

## 2020-09-03 RX ADMIN — FENTANYL CITRATE 25 MCG: 50 INJECTION, SOLUTION INTRAMUSCULAR; INTRAVENOUS at 08:56

## 2020-09-03 RX ADMIN — FENTANYL CITRATE 25 MCG: 50 INJECTION, SOLUTION INTRAMUSCULAR; INTRAVENOUS at 09:49

## 2020-09-03 RX ADMIN — FENTANYL CITRATE 25 MCG: 50 INJECTION, SOLUTION INTRAMUSCULAR; INTRAVENOUS at 09:12

## 2020-09-03 RX ADMIN — MIDAZOLAM 0.5 MG: 1 INJECTION INTRAMUSCULAR; INTRAVENOUS at 09:38

## 2020-09-03 RX ADMIN — MIDAZOLAM 0.5 MG: 1 INJECTION INTRAMUSCULAR; INTRAVENOUS at 08:44

## 2020-09-03 RX ADMIN — SODIUM CHLORIDE 75 ML/HR: 9 INJECTION, SOLUTION INTRAVENOUS at 00:45

## 2020-09-03 RX ADMIN — MIDAZOLAM 0.5 MG: 1 INJECTION INTRAMUSCULAR; INTRAVENOUS at 08:49

## 2020-09-03 RX ADMIN — FENTANYL CITRATE 25 MCG: 50 INJECTION, SOLUTION INTRAMUSCULAR; INTRAVENOUS at 08:44

## 2020-09-03 RX ADMIN — IOPAMIDOL 80 ML: 755 INJECTION, SOLUTION INTRAVENOUS at 15:17

## 2020-09-03 RX ADMIN — OCTREOTIDE ACETATE 50 MCG/HR: 500 INJECTION, SOLUTION INTRAVENOUS; SUBCUTANEOUS at 08:09

## 2020-09-03 RX ADMIN — FENTANYL CITRATE 25 MCG: 50 INJECTION, SOLUTION INTRAMUSCULAR; INTRAVENOUS at 08:39

## 2020-09-03 NOTE — ED PROVIDER NOTES
Xiomara Ya [address] Najera is a 60 y.o. male seen on 9/2/2020 in the [facility] EMERGENCY DEPT in room ER06/06. Chief Complaint   Patient presents with    Melena     HPI: 50-year-old male with recent diagnosis of metastatic pancreatic cancer presents emergency department with complaints of throwing up blood and having dark stools that started today. Patient states that this morning he noticed dark stools and then he had an episode of vomiting blood. Patient states that it was a significant amount. Patient states that he is lightheaded when he stands up. He states that he has some abdominal and back pain as well but states that the pain is his normal.  Patient scheduled for port placement tomorrow. Historian: Patient    REVIEW OF SYSTEMS     Review of Systems   Constitutional: Positive for fatigue. HENT: Negative. Eyes: Negative. Respiratory: Positive for shortness of breath. Cardiovascular: Negative. Gastrointestinal: Positive for abdominal pain, blood in stool, nausea and vomiting. Endocrine: Negative. Genitourinary: Negative. Musculoskeletal: Positive for back pain. Skin: Negative. Allergic/Immunologic: Negative. Neurological: Positive for light-headedness. Hematological: Negative. Psychiatric/Behavioral: Negative. All other systems reviewed and are negative.       PAST MEDICAL HISTORY     Past Medical History:   Diagnosis Date    Cancer [facility])     pancreatic    GERD (gastroesophageal reflux disease)     Hernia      Past Surgical History:   Procedure Laterality Date    HX CATARACT REMOVAL Left 12/15/2017    HX HERNIA REPAIR  2007    HX ORTHOPAEDIC Right 1982    tibia- with hardware     Social History     Socioeconomic History    Marital status:      Spouse name: Not on file    Number of children: Not on file    Years of education: Not on file    Highest education level: Not on file   Tobacco Use    Smoking status: Former Smoker     Packs/day: 1.00     Years: 10.00     Pack years: 10.00     Last attempt to quit: 2006     Years since quittin.6    Smokeless tobacco: Never Used   Substance and Sexual Activity    Alcohol use: Not Currently    Drug use: Never     Prior to Admission Medications   Prescriptions Last Dose Informant Patient Reported? Taking? cholecalciferol (VITAMIN D3) 1,000 unit tablet   Yes No   Sig: Take 1,000 Units by mouth daily. lidocaine-prilocaine (EMLA) topical cream   No No   Sig: Apply to port about 45 minutes prior to access   lisinopriL (PRINIVIL, ZESTRIL) 10 mg tablet   No No   Sig: Take 1 Tab by mouth daily. Patient taking differently: Take 10 mg by mouth nightly. ondansetron hcl (Zofran) 8 mg tablet   No No   Sig: Take 1 Tab by mouth every eight (8) hours as needed for Nausea. Indications: prevent nausea and vomiting from cancer chemotherapy   pantoprazole (PROTONIX) 40 mg tablet   Yes No   Sig: Take 40 mg by mouth two (2) times a day. prochlorperazine (Compazine) 10 mg tablet   No No   Sig: Take 1 Tab by mouth every six (6) hours as needed for Nausea. Indications: prevent nausea and vomiting from cancer chemotherapy   traMADoL (ULTRAM-ER) 100 mg Tb24   Yes No   vit B Cmplx 3-FA-Vit C-Biotin (NEPHRO VERÓNICA RX) 1- mg-mg-mcg tablet   Yes No   Sig: Take 1 Tab by mouth daily. Facility-Administered Medications: None     Allergies   Allergen Reactions    Tetanus And Diphtheria Toxoids Swelling     As child        PHYSICAL EXAM       Vitals:    20 2106 206 20 2141   BP: 92/52 106/53 108/55 111/57   Pulse: 97 75 82 93   Resp:  16 16 18   Temp:  98.6 °F (37 °C) 98.3 °F (36.8 °C) 99.3 °F (37.4 °C)   SpO2: 98% 100% 98% 100%    Vital signs were reviewed. Physical Exam  Vitals signs and nursing note reviewed. Constitutional:       Comments: Appears to not feel well   HENT:      Head: Normocephalic and atraumatic.       Nose: Nose normal.      Mouth/Throat:      Mouth: Mucous membranes are moist.   Eyes:      Extraocular Movements: Extraocular movements intact. Comments: Pale conjunctiva   Cardiovascular:      Rate and Rhythm: Normal rate. Pulses: Normal pulses. Heart sounds: Normal heart sounds. Pulmonary:      Effort: Pulmonary effort is normal.      Breath sounds: Normal breath sounds. Abdominal:      General: Abdomen is flat. Tenderness: There is abdominal tenderness. Musculoskeletal: Normal range of motion. Skin:     General: Skin is warm and dry. Neurological:      General: No focal deficit present. Mental Status: He is alert and oriented to person, place, and time. Psychiatric:         Mood and Affect: Mood normal.         Behavior: Behavior normal.         Thought Content: Thought content normal.         Judgment: Judgment normal.          MEDICAL DECISION MAKING     ED Course:    Recent Results (from the past 8 hour(s))   CBC WITH AUTOMATED DIFF    Collection Time: 09/02/20  7:31 PM   Result Value Ref Range    WBC 19.3 (H) 4.3 - 11.1 K/uL    RBC 2.42 (L) 4.23 - 5.6 M/uL    HGB 6.3 (LL) 13.6 - 17.2 g/dL    HCT 20.6 (LL) 41.1 - 50.3 %    MCV 85.1 79.6 - 97.8 FL    MCH 26.0 (L) 26.1 - 32.9 PG    MCHC 30.6 (L) 31.4 - 35.0 g/dL    RDW 15.4 (H) 11.9 - 14.6 %    PLATELET 223 266 - 093 K/uL    MPV 10.2 9.4 - 12.3 FL    ABSOLUTE NRBC 0.00 0.0 - 0.2 K/uL    DF AUTOMATED      NEUTROPHILS 76 43 - 78 %    LYMPHOCYTES 6 (L) 13 - 44 %    MONOCYTES 9 4.0 - 12.0 %    EOSINOPHILS 7 0.5 - 7.8 %    BASOPHILS 1 0.0 - 2.0 %    IMMATURE GRANULOCYTES 1 0.0 - 5.0 %    ABS. NEUTROPHILS 14.8 (H) 1.7 - 8.2 K/UL    ABS. LYMPHOCYTES 1.2 0.5 - 4.6 K/UL    ABS. MONOCYTES 1.6 (H) 0.1 - 1.3 K/UL    ABS. EOSINOPHILS 1.4 (H) 0.0 - 0.8 K/UL    ABS. BASOPHILS 0.1 0.0 - 0.2 K/UL    ABS. IMM.  GRANS. 0.3 0.0 - 0.5 K/UL   PROTHROMBIN TIME + INR    Collection Time: 09/02/20  7:31 PM   Result Value Ref Range    Prothrombin time 15.1 (H) 12.0 - 14.7 sec    INR 1.2     LIPASE    Collection Time: 09/02/20  7:31 PM   Result Value Ref Range    Lipase 504 (H) 73 - 393 U/L   MAGNESIUM    Collection Time: 09/02/20  7:31 PM   Result Value Ref Range    Magnesium 1.9 1.8 - 2.4 mg/dL   METABOLIC PANEL, COMPREHENSIVE    Collection Time: 09/02/20  7:31 PM   Result Value Ref Range    Sodium 139 136 - 145 mmol/L    Potassium 3.9 3.5 - 5.1 mmol/L    Chloride 104 98 - 107 mmol/L    CO2 22 21 - 32 mmol/L    Anion gap 13 7 - 16 mmol/L    Glucose 187 (H) 65 - 100 mg/dL    BUN 15 8 - 23 MG/DL    Creatinine 1.21 0.8 - 1.5 MG/DL    GFR est AA >60 >60 ml/min/1.73m2    GFR est non-AA >60 >60 ml/min/1.73m2    Calcium 8.4 8.3 - 10.4 MG/DL    Bilirubin, total 0.2 0.2 - 1.1 MG/DL    ALT (SGPT) 13 12 - 65 U/L    AST (SGOT) 19 15 - 37 U/L    Alk. phosphatase 71 50 - 136 U/L    Protein, total 5.7 (L) 6.3 - 8.2 g/dL    Albumin 2.5 (L) 3.2 - 4.6 g/dL    Globulin 3.2 2.3 - 3.5 g/dL    A-G Ratio 0.8 (L) 1.2 - 3.5     TYPE & SCREEN    Collection Time: 09/02/20  7:33 PM   Result Value Ref Range    Crossmatch Expiration 09/05/2020     ABO/Rh(D) O POSITIVE     Antibody screen NEG     Unit number [id number]     Blood component type [facility]     Unit division 00     Status of unit ISSUED     Crossmatch result Compatible     Unit number [id number]     Blood component type [facility]     Unit division 00     Status of unit ISSUED     Crossmatch result Compatible      No results found. MDM  Number of Diagnoses or Management Options  Acute upper GI bleed:   Gastrointestinal hemorrhage with hematemesis:   Symptomatic anemia:   Diagnosis management comments: 81-year-old male with known metastatic pancreatic cancer presented with hematemesis and tarry stools. Patient with one episode of significant vomiting of lino blood in the emergency department. Patient hemoglobin was 6.3 and patient was initially hypotense. Patient transfused emergency department.   He is also started on Protonix and octreotide drips. Patient evaluated by GI in the emergency department. GI discussed patient with interventional radiology. Patient likely to have procedure in the morning. Patient will be admitted to the hospitalist for further treatment evaluation. Amount and/or Complexity of Data Reviewed  Clinical lab tests: ordered and reviewed  Tests in the medicine section of CPT®: reviewed and ordered  Decide to obtain previous medical records or to obtain history from someone other than the patient: yes  Review and summarize past medical records: yes  Discuss the patient with other providers: yes      Critical Care  Performed by: King Sibley DO  Authorized by: King Sibley DO     Critical care provider statement:     Critical care time (minutes):  39    Critical care was necessary to treat or prevent imminent or life-threatening deterioration of the following conditions:  Circulatory failure and shock    Critical care was time spent personally by me on the following activities:  Blood draw for specimens, development of treatment plan with patient or surrogate, discussions with consultants, evaluation of patient's response to treatment, examination of patient, obtaining history from patient or surrogate, review of old charts, re-evaluation of patient's condition, pulse oximetry, ordering and review of laboratory studies and ordering and performing treatments and interventions        Disposition:    Diagnosis:     ICD-10-CM ICD-9-CM   1. Acute upper GI bleed  K92.2 578.9   2. Gastrointestinal hemorrhage with hematemesis  K92.0 578.0   3. Symptomatic anemia  D64.9 285.9     ____________________________________________________________________  A portion of this note was generated using voice recognition dictation software.  While the note has been reviewed for accuracy, please note certain words and phrases may not be transcribed as intended and some grammatical and/or typographical errors may be present.

## 2020-09-03 NOTE — PROGRESS NOTES
Hospitalist Progress Note    9/3/2020  Admit Date: 2020  7:08 PM   NAME: Dante Reaves   :  1955   MRN:  [id number]   Attending: Rubi Guerrero MD  PCP:  Kuldip Walter MD    SUBJECTIVE:   Patient 22K with pmhx of adenocarcinoma of head of pancreas with liver mets, s/p EUS on  that showed large ulcerated duodenal mass extending to duodenal bulb with stricture distally, brought to ER via EMS with report of dark tarry stoos and hematemesis. . Admitted to hospitalist service and started on IV PPI, octreotide gtt. GI, ONc and IR consulted. 9/3 - s/p mesenteric arteriogram w/ coil emoblization by IR. Seen after procedure. Reports episode of melena x1 this afternoon. No abdominal pain, nausesa or vomiting. Review of Systems negative with exception of pertinent positives noted above  PHYSICAL EXAM     Visit Vitals  /70   Pulse 89   Temp 98.3 °F (36.8 °C)   Resp 18   Ht 5' 6\" (1.676 m)   Wt 70.3 kg (155 lb)   SpO2 93%   BMI 25.02 kg/m²      Temp (24hrs), Av.6 °F (37 °C), Min:98 °F (36.7 °C), Max:99.4 °F (37.4 °C)    Oxygen Therapy  O2 Sat (%): 93 % (20 1645)  Pulse via Oximetry: 84 beats per minute (20 1156)  O2 Device: Room air (20 1645)  O2 Flow Rate (L/min): 3 l/min (20 1003)  ETCO2 (mmHg): 25 mmHg (20 1003)    Intake/Output Summary (Last 24 hours) at 9/3/2020 1837  Last data filed at 9/3/2020 1310  Gross per 24 hour   Intake    Output 800 ml   Net -800 ml      General: No acute distress    Lungs:  CTA Bilaterally.    Heart:  Regular rate and rhythm,  No murmur, rub, or gallop  Abdomen: Soft, Non distended, Non tender, Positive bowel sounds  Extremities: No cyanosis, clubbing or edema  Neurologic:  No focal deficits    ASSESSMENT      Active Hospital Problems    Diagnosis Date Noted    Hematemesis 2020    Acute blood loss anemia 2020    Pancreatic cancer ([facility] [address] 2020    Hypotension 2020    Duodenal mass 2020    Malignant neoplasm of head of pancreas ([facility] [address] 08/31/2020    Duodenal ulcer 08/12/2020    Essential hypertension 05/08/2018     A/P  - Acute blood loss anemia - secondary to ulcerated duodenal mass. S/p IR emoblization. Serial H&H.     - Pancreatic CA - oncology consult pending. Had plans for port placement on 9/3 in anticipation of chemo. GI following. CT chest for staging ordered. - HTN - HYPOtension improved.  ?resume lisinopril in AM    DVT Prophylaxis: scds    Signed By: Radha West DO     September 3, 2020

## 2020-09-03 NOTE — PROGRESS NOTES
Have been by patient's room several times this morning. He has been off the floor. Will try again later.    ALINA Saunders

## 2020-09-03 NOTE — ROUTINE PROCESS
Bedside and Verbal shift change report given to self  (oncoming nurse) by [address] (offgoing nurse). Report included the following information SBAR, Kardex, Procedure Summary, Intake/Output, MAR and Recent Results.

## 2020-09-03 NOTE — CONSULTS
[facility] Consult Note    Referring Physician:  Dr Clementina Segovia Date:  9/2/2020    Admit Date:  9/2/2020    Chief Complaint:  Hematemesis     Subjective:     History of Present Illness:  Patient is a 60 y.o. male  who is seen in consultation at the request of Dr. Nader Spangler for hematemesis and anemia. He presented for EUS last week for abnormal CT scan concerning for pancreatic head mass with liver mets. EUS (as below) showed a large ulcerated mass extending into the duodenal bulb with a stricture distal to this for which the scope could not traverse. Bx's from pancreas, duodenum, and pancreas all show adenocarcinoma. Onc feels this is most likely primary pancreatic and there were plans for port placement tomorrow. He notices several black stools this am and then had an episode of large volume hematemesis this afternoon. He's had a total of 3 episodes with mild RUQ pain but currently is feeling better. Initially he was hypotensive with a Hgb of 6.3 (9.0 three days ago). FINDINGS:  ESOPHAGUS: Not well visualized  STOMACH: Limited views with the oblique-viewing echoendoscope were unremarkable  DUODENUM: There is a friable ulcerated mass in the distal bulb. This involves more than 3/4 of the circumference of the duodenum. It creates is tight stricture, and the large caliber EUS scope is not able to pass through. The appearance suggests malignancy, primary duodenal versus extension of a pancreatic head cancer. Multiple biopsies obtained with cold forceps. PANCREAS: The pancreas was well visualized In the body and tail, but only limited views of the head of the pancreas are available from the gastric antrum and pyloric channel. The scope cannot be advanced further into the duodenum because of the stricture. The ampulla Cannot be visualized. There is a heterogeneous hypoechoic mass with irregular outer margins involving the head of the pancreas. It measures 41 mm in cross section.  There is a prominent central prefer echoic area. It is inseparable from the duodenal wall. It does not directly involve the portal vein or celiac axis. The SMA cannot be fully visualized. FNA was performed from a transgastric approach at the pylorus. 2 passes were made and a large amount of tissue was obtained using a [employer] acquire 22-gauge needle. Sent in formalin. BILIARY SYSTEM:  The gallbladder is normal. Common bile duct is not well visualized from the stomach. OTHER ORGANS: There was no ascites in the upper abdomen. Limited views of the left lobe of the liver demonstrated Multiple prominent solid mass lesions, Consistent with metastases. There is also a large simple cyst near the central portion of the liver. The solid metastases were targeted for FNA using a separate acquire 22-gauge needle. The largest mass visible was 16 mm, round, hypoechoic with a slightly hyperechoic center, typical of metastatic disease. Tissue was sent in formalin. The celiac axis appears normal.  There was significant portal and lai-pancreatic lymphadenopathy. A lymph node adjacent to the head of the pancreas measures 18 x 16 mm. It is oval, homogeneous, with well defined margins. The appearance is atypical for metastatic involvement. PMH:  Past Medical History:   Diagnosis Date    Cancer [facility])     pancreatic    GERD (gastroesophageal reflux disease)     Hernia        PSH:  Past Surgical History:   Procedure Laterality Date    HX CATARACT REMOVAL Left 12/15/2017    HX HERNIA REPAIR  2007    HX ORTHOPAEDIC Right 1982    tibia- with hardware       Allergies: Allergies   Allergen Reactions    Tetanus And Diphtheria Toxoids Swelling     As child       Home Medications:  Prior to Admission medications    Medication Sig Start Date End Date Taking?  Authorizing Provider   traMADoL (ULTRAM-ER) 100 mg Tb24  8/20/20   Provider, Historical   prochlorperazine (Compazine) 10 mg tablet Take 1 Tab by mouth every six (6) hours as needed for Nausea. Indications: prevent nausea and vomiting from cancer chemotherapy 8/31/20   Krystle Jerome MD   ondansetron hcl (Zofran) 8 mg tablet Take 1 Tab by mouth every eight (8) hours as needed for Nausea. Indications: prevent nausea and vomiting from cancer chemotherapy 8/31/20   Krystle Jerome MD   lidocaine-prilocaine (EMLA) topical cream Apply to port about 45 minutes prior to access 8/31/20   Krystle Jerome MD   lisinopriL (PRINIVIL, ZESTRIL) 10 mg tablet Take 1 Tab by mouth daily. Patient taking differently: Take 10 mg by mouth nightly. 8/13/20   Tierney Herzog MD   pantoprazole (PROTONIX) 40 mg tablet Take 40 mg by mouth two (2) times a day. 7/24/20   Provider, Historical   vit B Cmplx 3-FA-Vit C-Biotin (NEPHRO VERÓNICA RX) 1- mg-mg-mcg tablet Take 1 Tab by mouth daily. Provider, Historical   cholecalciferol (VITAMIN D3) 1,000 unit tablet Take 1,000 Units by mouth daily. Provider, Historical       Hospital Medications:  Current Facility-Administered Medications   Medication Dose Route Frequency    octreotide (SANDOSTATIN) injection 50 mcg  50 mcg IntraVENous NOW    octreotide (SANDOSTATIN) 500 mcg in 0.9% sodium chloride 500 mL infusion  50 mcg/hr IntraVENous CONTINUOUS    0.9% sodium chloride infusion 250 mL  250 mL IntraVENous PRN    sodium chloride 0.9 % bolus infusion 1,000 mL  1,000 mL IntraVENous ONCE     Current Outpatient Medications   Medication Sig    traMADoL (ULTRAM-ER) 100 mg Tb24     prochlorperazine (Compazine) 10 mg tablet Take 1 Tab by mouth every six (6) hours as needed for Nausea. Indications: prevent nausea and vomiting from cancer chemotherapy    ondansetron hcl (Zofran) 8 mg tablet Take 1 Tab by mouth every eight (8) hours as needed for Nausea.  Indications: prevent nausea and vomiting from cancer chemotherapy    lidocaine-prilocaine (EMLA) topical cream Apply to port about 45 minutes prior to access    lisinopriL (PRINIVIL, ZESTRIL) 10 mg tablet Take 1 Tab by mouth daily. (Patient taking differently: Take 10 mg by mouth nightly.)    pantoprazole (PROTONIX) 40 mg tablet Take 40 mg by mouth two (2) times a day.  vit B Cmplx 3-FA-Vit C-Biotin (NEPHRO VERÓNICA RX) 1- mg-mg-mcg tablet Take 1 Tab by mouth daily.  cholecalciferol (VITAMIN D3) 1,000 unit tablet Take 1,000 Units by mouth daily. Social History:  Social History     Tobacco Use    Smoking status: Former Smoker     Packs/day: 1.00     Years: 10.00     Pack years: 10.00     Last attempt to quit: 2006     Years since quittin.6    Smokeless tobacco: Never Used   Substance Use Topics    Alcohol use: Not Currently         Family History:  Family History   Problem Relation Age of Onset    No Known Problems Mother     Cancer Father 72         leukemia    Diabetes Sister     No Known Problems Brother     No Known Problems Sister     Cancer Sister        Review of Systems:  A detailed 10 system ROS is obtained, with pertinent positives as listed above. All others are negative. Diet:  NPO    Objective:     Physical Exam:  Vitals:  Visit Vitals  BP 92/52   Pulse 97   Temp 98 °F (36.7 °C)   Resp 14   Ht 5' 6\" (1.676 m)   Wt 70.3 kg (155 lb)   SpO2 98%   BMI 25.02 kg/m²     Gen:  Pt is alert, cooperative, no acute distress  Skin:  Extremities and face reveal no rashes. No juandice   HEENT: Sclerae anicteric. The neck is supple. Cardiovascular: Regular rate and rhythm. No murmurs, gallops, or rubs. Respiratory:  Comfortable breathing with no accessory muscle use. Clear breath sounds anteriorly with no wheezes, rales, or rhonchi. GI:  Abdomen nondistended, soft, and nontender. Normal active bowel sounds. No enlargement of the liver or spleen. No masses palpable. Rectal:  Deferred  Musculoskeletal:  No pitting edema of the lower legs. Extremities have good range of motion. Neurological:  Gross memory appears intact. Patient is alert and oriented.       Laboratory: Recent Labs     09/02/20  1931 08/31/20  1245   WBC 19.3* 12.5*   HGB 6.3* 9.0*   HCT 20.6* 29.2*    401   MCV 85.1 84.4    136   K 3.9 4.0    104   CO2 22 27   BUN 15 7*   CREA 1.21 1.10   CA 8.4 9.6   * 96   AP 71 92   ALT 13 19   TBILI 0.2 0.3   LPSE 504*  --    PTP 15.1* 14.2   INR 1.2 1.1   APTT  --  33.2       Assessment:       Active Problems:    * No active hospital problems. *      Plan:       57yo male with newly dx'd metastatic pancreatic cancer (likely) complicated by large penetrating ulcerated mass in the duodenum, presenting with hematemesis, melena, and ABLA concerning for UGIB 2/2 the ulcerated mass. Unfortunately, this cannot be treated endoscopically. Would contact hospitalist for ICU admission. He needs a PPI bolus and gtt along with transfusion and IVFs. I've discussed the case with Dr Brigido Dunlap of IR who is aware of the pt. We will plan on angiography with possible embolization in the am as long as the pt remains stable. If he starts bleeding with vital sign instability over night, please call IR. Leave NPO for now. GI will follow.     Mayela Nettles MD

## 2020-09-03 NOTE — PROGRESS NOTES
TRANSFER - OUT REPORT:    Verbal report given to GIOVANNI Vazquez on [employer]  being transferred to room 329 for routine progression of care       Report consisted of patients Situation, Background, Assessment and   Recommendations(SBAR). Information from the following report(s) SBAR, Kardex, Procedure Summary and MAR was reviewed with the receiving nurse. Lines:   Peripheral IV Left Forearm (Active)   Site Assessment Clean, dry, & intact 09/03/20 0634   Phlebitis Assessment 0 09/03/20 0634   Infiltration Assessment 0 09/03/20 0634   Dressing Status Clean, dry, & intact 09/03/20 0634   Dressing Type Transparent;Tape 09/03/20 0634   Hub Color/Line Status Patent 09/03/20 0634       Peripheral IV Right Antecubital (Active)   Site Assessment Clean, dry, & intact 09/03/20 0634   Phlebitis Assessment 0 09/03/20 0634   Infiltration Assessment 0 09/03/20 0634   Dressing Status Clean, dry, & intact 09/03/20 0634   Dressing Type Transparent;Tape 09/03/20 0634   Hub Color/Line Status Patent 09/03/20 0634       Peripheral IV Right Arm (Active)   Site Assessment Clean, dry, & intact 09/03/20 0634   Phlebitis Assessment 0 09/03/20 0634   Infiltration Assessment 0 09/03/20 0634   Dressing Status Clean, dry, & intact 09/03/20 0634   Dressing Type Transparent;Tape 09/03/20 0634   Hub Color/Line Status Patent; Flushed 09/03/20 [id number]        Opportunity for questions and clarification was provided.       Patient transported with:   Transport

## 2020-09-03 NOTE — ROUTINE PROCESS
Bedside and Verbal shift change report given to [address] (oncoming nurse) by self (offgoing nurse). Report included the following information SBAR, Kardex, ED Summary, [facility] and Recent Results.

## 2020-09-03 NOTE — PROGRESS NOTES
[facility] Progress Note         Admit Date:  9/2/2020    Today's Date:  9/3/2020    CC:  GI bleed    Subjective:     Patient is s/p IR embolization. Eating a clear liquid diet. Denies any upper or lower GI complaints at this time. Wife is at the bedside.          Date of Procedure: 9/3/2020     Pre-Procedure Diagnosis: Pancreatic cancer with intestinal hemorrhage.      Post-Procedure Diagnosis: SAME     Procedure(s): Mesenteric Arteriogram w coil embolization.       Brief Description of Procedure: as above     Performed By: Jonathan Chavez MD      Assistants: None     Anesthesia:Moderate Sedation     Estimated Blood Loss: Less than 10ml     Specimens:  None     Implants:  Metal coils.      Findings: Tumor blush associated w GDA branches embolized to stasis.        Complications: None     Recommendations: 3 hour bedrest.        Follow Up: Chest port placement tomorrow.       Signed By: Jonathan Chavez MD      September 3, 2020          Medications:   Current Facility-Administered Medications   Medication Dose Route Frequency    ondansetron (ZOFRAN) injection 4 mg  4 mg IntraVENous Q6H PRN    promethazine (PHENERGAN) with saline injection 12.5 mg  12.5 mg IntraVENous Q6H PRN    0.9% sodium chloride infusion  75 mL/hr IntraVENous CONTINUOUS    HYDROmorphone (PF) (DILAUDID) injection 1 mg  1 mg IntraVENous Q4H PRN    acetaminophen (TYLENOL) suppository 650 mg  650 mg Rectal Q4H PRN    enalaprilat (VASOTEC) injection 1.25 mg  1.25 mg IntraVENous Q6H PRN    fentaNYL citrate (PF) injection 25-50 mcg  25-50 mcg IntraVENous Multiple    midazolam (VERSED) injection 0.5-2 mg  0.5-2 mg IntraVENous Multiple    iopamidoL (ISOVUE 300) 61 % contrast injection 1-20 mL  1-20 mL IntraCATHeter ONCE    heparin (PF) 2 units/ml in NS infusion 2,000-8,000 Units  1,000-4,000 mL Irrigation PRN    octreotide (SANDOSTATIN) 500 mcg in 0.9% sodium chloride 500 mL infusion  50 mcg/hr IntraVENous CONTINUOUS  0.9% sodium chloride infusion 250 mL  250 mL IntraVENous PRN    pantoprazole (PROTONIX) 80 mg in 0.9% sodium chloride 20 mL injection  80 mg IntraVENous Q12H       Review of Systems:  ROS was obtained, with pertinent positives as listed above. No chest pain or SOB. Diet:      Objective:   Vitals:  Visit Vitals  /70   Pulse 93   Temp 99.4 °F (37.4 °C)   Resp 16   Ht 5' 6\" (1.676 m)   Wt 70.3 kg (155 lb)   SpO2 98%   BMI 25.02 kg/m²     Intake/Output:  No intake/output data recorded. No intake/output data recorded. Exam:  General appearance: alert, cooperative, no distress  Lungs: clear to auscultation bilaterally anteriorly  Heart: regular rate and rhythm  Abdomen: soft, non-tender.  Bowel sounds normal. No masses, no organomegaly  Extremities: extremities normal, atraumatic, no cyanosis or edema  Neuro:  alert and oriented    Data Review (Labs):    Recent Labs     09/03/20  0341 09/02/20  1931 08/31/20  1245   WBC  --  19.3* 12.5*   HGB 8.5* 6.3* 9.0*   HCT 25.2* 20.6* 29.2*   PLT  --  366 401   MCV  --  85.1 84.4    139 136   K 4.7 3.9 4.0    104 104   CO2 26 22 27   BUN 17 15 7*   CREA 1.11 1.21 1.10   CA 8.5 8.4 9.6   MG  --  1.9  --    * 187* 96   AP  --  71 92   ALT  --  13 19   TBILI  --  0.2 0.3   ALB  --  2.5* 3.4   TP  --  5.7* 7.6   LPSE  --  504*  --    PTP  --  15.1* 14.2   INR  --  1.2 1.1   APTT  --   --  33.2       Assessment:     Principal Problem:    Acute blood loss anemia (9/2/2020)    Active Problems:    Essential hypertension (5/8/2018)      Duodenal ulcer (8/12/2020)      Malignant neoplasm of head of pancreas ([facility] [address] (8/31/2020)      Hematemesis (9/2/2020)      [facility] (9/2/2020)      Hypotension (9/2/2020)      Duodenal mass (9/2/2020)      57yo male with newly dx'd metastatic pancreatic cancer (likely) complicated by large penetrating ulcerated mass in the duodenum, Who presented with hematemesis, melena, and ABLA concerning for UGIB 2/2 the ulcerated mass. IR consulted for embolization as this can not be treated endoscopically. Vitals stable overnight. HGB 8.3 this morning  Plan:     Follow HGB and transfuse as needed  Continue with PPI gtt      Donte Hoffmann NP    Patient is seen and examined in collaboration with Dr. Tran Carter. Assessment and plan as per Dr. Tran Carter.

## 2020-09-03 NOTE — ROUTINE PROCESS
Bedside and Verbal shift change report to be given to Cinthya and Alfredito Lopez RN's (oncoming nurse) by self (offgoing nurse). Report included the following information SBAR, Kardex and MAR.

## 2020-09-03 NOTE — PROGRESS NOTES
TRANSFER - IN REPORT:    Verbal report received from [address] (name) on [employer]  being received from IR (unit) for routine progression of care      Report consisted of patients Situation, Background, Assessment and   Recommendations(SBAR). Information from the following report(s) SBAR, Kardex and MAR was reviewed with the receiving nurse. Opportunity for questions and clarification was provided. Assessment completed upon patients arrival to unit and care assumed. Right groin site is clean dry and intact. Slight oozing not related to procedure per RN. Will continue to monitor.

## 2020-09-03 NOTE — CONSULTS
[facility] Hematology & Oncology        Inpatient Hematology / Oncology Consult Note    Reason for Consult:  Acute blood loss anemia [D62]  Pancreatic cancer ([address] [C25.9]  Duodenal mass [K31.89]  Hematemesis [K92.0]  Hypotension [I95.9]  Acute blood loss anemia [D62]  Referring Physician:  Willian Siemens, MD    History of Present Illness:  Mr. Maggy Villagran is a 60 y.o. male admitted on 9/2/2020. The primary encounter diagnosis was Acute upper GI bleed. Diagnoses of Gastrointestinal hemorrhage with hematemesis and Symptomatic anemia were also pertinent to this visit. Romeo Sanon His PMH includes GERD. He is a known patient of Dr. Vivi Esparza with newly diagnosed pancreatic mass, duodenal mass, and liver mets - presumed metastatic pancreatic cancer. IHC/molecular studies pending. Had appt scheduled for port placement today. He presented to ED with c/o tarry stools with 2 episodes of hematemesis. Reports LLQ abd pain x 3 months. Also reports burping and nausea. Hgb 6.3 on admission. Hgb 6.8 today. PRBC ordered. Pt is iron deficient with Tsat 3% and ferritin 20. Rec'd Infed on 9/3. He is s/p mesenteric arteriogram with coil embolization yesterday. CT chest ordered yesterday to complete staging revealed scattered pulm nodules c/w normal intraparenchymal lymph nodes; no definite met dz; and interval progression of met dz to liver. This AM, he was discovered to be hypoxic with O2 77%. Now on O2 @ 2L. CXR stable. We were consulted for recommendations for our patient. Review of Systems:  Constitutional Denies fever, chills, weight loss, appetite changes, fatigue, night sweats. HEENT Denies trauma, blurry vision, hearing loss, ear pain, nosebleeds, sore throat, neck pain and ear discharge. Skin Denies lesions or rashes. Lungs Denies dyspnea, cough, sputum production or hemoptysis. Cardiovascular Denies chest pain, palpitations, or lower extremity edema.    Gastrointestinal +nausea, burping, abd pain, hematemesis, dark stools    Denies dysuria, frequency or hesitancy of urination. Neuro Denies headaches, visual changes or ataxia. Denies dizziness, tingling, tremors, sensory change, speech change, focal weakness or headaches. Hematology Denies easy bruising or bleeding, denies gingival bleeding or epistaxis. Endo Denies heat/cold intolerance, denies diabetes or thyroid abnormalities. MSK Denies back pain, arthralgias, myalgias or frequent falls. Psychiatric/Behavioral Denies depression and substance abuse. The patient is not nervous/anxious.          Allergies   Allergen Reactions    Tetanus And Diphtheria Toxoids Swelling     As child     Past Medical History:   Diagnosis Date    Cancer ([address]     pancreatic    GERD (gastroesophageal reflux disease)     Hernia      Past Surgical History:   Procedure Laterality Date    HX CATARACT REMOVAL Left 12/15/2017    HX HERNIA REPAIR      HX ORTHOPAEDIC Right     tibia- with hardware     Family History   Problem Relation Age of Onset    No Known Problems Mother     Cancer Father 72         leukemia    Diabetes Sister     No Known Problems Brother     No Known Problems Sister     Cancer Sister      Social History     Socioeconomic History    Marital status:      Spouse name: Not on file    Number of children: Not on file    Years of education: Not on file    Highest education level: Not on file   Occupational History    Not on file   Social Needs    Financial resource strain: Not on file    Food insecurity     Worry: Not on file     Inability: Not on file    Transportation needs     Medical: Not on file     Non-medical: Not on file   Tobacco Use    Smoking status: Former Smoker     Packs/day: 1.00     Years: 10.00     Pack years: 10.00     Last attempt to quit: 2006     Years since quittin.6    Smokeless tobacco: Never Used   Substance and Sexual Activity    Alcohol use: Not Currently    Drug use: Never    Sexual activity: Not on file   Lifestyle    Physical activity     Days per week: Not on file     Minutes per session: Not on file    Stress: Not on file   Relationships    Social connections     Talks on phone: Not on file     Gets together: Not on file     Attends Yarsani service: Not on file     Active member of club or organization: Not on file     Attends meetings of clubs or organizations: Not on file     Relationship status: Not on file    Intimate partner violence     Fear of current or ex partner: Not on file     Emotionally abused: Not on file     Physically abused: Not on file     Forced sexual activity: Not on file   Other Topics Concern    Not on file   Social History Narrative    Not on file     Current Facility-Administered Medications   Medication Dose Route Frequency Provider Last Rate Last Dose    ondansetron (ZOFRAN) injection 4 mg  4 mg IntraVENous Q6H PRN Chrissie Leo MD        [facility]) with saline injection 12.5 mg  12.5 mg IntraVENous Q6H PRN Chrissie Leo MD        0.9% sodium chloride infusion  75 mL/hr IntraVENous CONTINUOUS Chrissie Leo MD 75 mL/hr at 09/03/20 0045 75 mL/hr at 09/03/20 0045    HYDROmorphone (PF) (DILAUDID) injection 1 mg  1 mg IntraVENous Q4H PRN Chrissie Leo MD        acetaminophen (TYLENOL) suppository 650 mg  650 mg Rectal Q4H PRN Chrissie Leo MD        enalaprilat (VASOTEC) injection 1.25 mg  1.25 mg IntraVENous Q6H PRN Chrissie Leo MD        0.9% sodium chloride infusion  25 mL/hr IntraVENous ONCE Jaison Hook MD        diphenhydrAMINE (BENADRYL) injection 12.5-50 mg  12.5-50 mg IntraVENous ONCE Jaison Hook MD        fentaNYL citrate (PF) injection 25-50 mcg  25-50 mcg IntraVENous Multiple Jaison Hook MD        midazolam (VERSED) injection 0.5-2 mg  0.5-2 mg IntraVENous Multiple Jaison Hook MD        iopamidoL (ISOVUE 300) 61 % contrast injection 1-20 mL  1-20 mL IntraCATHeter ONCE Judah Wang MD        lidocaine (XYLOCAINE) 20 mg/mL (2 %) injection 4 mg  0.2 mL IntraDERMal ONCE Judah Wang MD        heparin (PF) 2 units/ml in NS infusion 2,000-8,000 Units  1,000-4,000 mL Irrigation PRN Judah Wang MD        octreotide (SANDOSTATIN) 500 mcg in 0.9% sodium chloride 500 mL infusion  50 mcg/hr IntraVENous CONTINUOUS LinebaDl damon DO 50 mL/hr at 20 0809 50 mcg/hr at 20 0809    0.9% sodium chloride infusion 250 mL  250 mL IntraVENous PRN Dl Glaser DO        pantoprazole (PROTONIX) 80 mg in 0.9% sodium chloride 20 mL injection  80 mg IntraVENous Q12H Dl Glaser DO           OBJECTIVE:  Patient Vitals for the past 8 hrs:   BP Temp Pulse Resp SpO2 Height Weight   20 0746 177/74 99.4 °F (37.4 °C) 91 18 98 %     20 0745      5' 6\" (1.676 m) 155 lb (70.3 kg)   20 0634 165/75  (!) 101 18 96 % 5' 6\" (1.676 m) 155 lb 9.6 oz (70.6 kg)   20 0439 144/85 99.3 °F (37.4 °C) 75 18 99 %     20 0207 141/74 98.8 °F (37.1 °C) 79 20 98 %     20 0157 129/67 98.2 °F (36.8 °C) 79 18 100 %     20 0116 121/69  86 20 100 %     20 0047 127/58 98.5 °F (36.9 °C) 87 18 99 %     20 0032 139/64 98.7 °F (37.1 °C) 89 16 100 %       Temp (24hrs), Av.6 °F (37 °C), Min:98 °F (36.7 °C), Max:99.4 °F (37.4 °C)    No intake/output data recorded. Physical Exam:  Constitutional: Well developed, well nourished male in no acute distress, sitting comfortably in the hospital bed. Wife at bedside. HEENT: Normocephalic and atraumatic. Oropharynx is clear, mucous membranes are moist.  Extraocular muscles are intact. Sclerae anicteric. Neck supple without JVD. No thyromegaly present. Skin Warm and dry. No bruising and no rash noted. No erythema. No pallor.     Respiratory Lungs are clear to auscultation bilaterally without wheezes, rales or rhonchi, normal air exchange without accessory muscle use. On O2 via NC.   CVS Normal rate, regular rhythm and normal S1 and S2. No murmurs, gallops, or rubs. Abdomen Soft, nontender and nondistended, normoactive bowel sounds. No palpable mass. No hepatosplenomegaly. Neuro Grossly nonfocal with no obvious sensory or motor deficits. MSK Normal range of motion in general.  No edema and no tenderness. Psych Appropriate mood and affect. Labs:    Recent Results (from the past 24 hour(s))   CBC WITH AUTOMATED DIFF    Collection Time: 09/02/20  7:31 PM   Result Value Ref Range    WBC 19.3 (H) 4.3 - 11.1 K/uL    RBC 2.42 (L) 4.23 - 5.6 M/uL    HGB 6.3 (LL) 13.6 - 17.2 g/dL    HCT 20.6 (LL) 41.1 - 50.3 %    MCV 85.1 79.6 - 97.8 FL    MCH 26.0 (L) 26.1 - 32.9 PG    MCHC 30.6 (L) 31.4 - 35.0 g/dL    RDW 15.4 (H) 11.9 - 14.6 %    PLATELET 915 752 - 676 K/uL    MPV 10.2 9.4 - 12.3 FL    ABSOLUTE NRBC 0.00 0.0 - 0.2 K/uL    DF AUTOMATED      NEUTROPHILS 76 43 - 78 %    LYMPHOCYTES 6 (L) 13 - 44 %    MONOCYTES 9 4.0 - 12.0 %    EOSINOPHILS 7 0.5 - 7.8 %    BASOPHILS 1 0.0 - 2.0 %    IMMATURE GRANULOCYTES 1 0.0 - 5.0 %    ABS. NEUTROPHILS 14.8 (H) 1.7 - 8.2 K/UL    ABS. LYMPHOCYTES 1.2 0.5 - 4.6 K/UL    ABS. MONOCYTES 1.6 (H) 0.1 - 1.3 K/UL    ABS. EOSINOPHILS 1.4 (H) 0.0 - 0.8 K/UL    ABS. BASOPHILS 0.1 0.0 - 0.2 K/UL    ABS. IMM.  GRANS. 0.3 0.0 - 0.5 K/UL   PROTHROMBIN TIME + INR    Collection Time: 09/02/20  7:31 PM   Result Value Ref Range    Prothrombin time 15.1 (H) 12.0 - 14.7 sec    INR 1.2     LIPASE    Collection Time: 09/02/20  7:31 PM   Result Value Ref Range    Lipase 504 (H) 73 - 393 U/L   MAGNESIUM    Collection Time: 09/02/20  7:31 PM   Result Value Ref Range    Magnesium 1.9 1.8 - 2.4 mg/dL   METABOLIC PANEL, COMPREHENSIVE    Collection Time: 09/02/20  7:31 PM   Result Value Ref Range    Sodium 139 136 - 145 mmol/L    Potassium 3.9 3.5 - 5.1 mmol/L    Chloride 104 98 - 107 mmol/L    CO2 22 21 - 32 mmol/L    Anion gap 13 7 - 16 mmol/L Glucose 187 (H) 65 - 100 mg/dL    BUN 15 8 - 23 MG/DL    Creatinine 1.21 0.8 - 1.5 MG/DL    GFR est AA >60 >60 ml/min/1.73m2    GFR est non-AA >60 >60 ml/min/1.73m2    Calcium 8.4 8.3 - 10.4 MG/DL    Bilirubin, total 0.2 0.2 - 1.1 MG/DL    ALT (SGPT) 13 12 - 65 U/L    AST (SGOT) 19 15 - 37 U/L    Alk.  phosphatase 71 50 - 136 U/L    Protein, total 5.7 (L) 6.3 - 8.2 g/dL    Albumin 2.5 (L) 3.2 - 4.6 g/dL    Globulin 3.2 2.3 - 3.5 g/dL    A-G Ratio 0.8 (L) 1.2 - 3.5     TYPE & SCREEN    Collection Time: 09/02/20  7:33 PM   Result Value Ref Range    Crossmatch Expiration 09/05/2020     ABO/Rh(D) O POSITIVE     Antibody screen NEG     Unit number [id number]     Blood component type [facility]     Unit division 00     Status of unit TRANSFUSED     Crossmatch result Compatible     Unit number [id number]     Blood component type [facility]     Unit division 00     Status of unit TRANSFUSED     Crossmatch result Compatible     Unit number [id number]     Blood component type [facility]     Unit division 00     Status of unit ISSUED     Crossmatch result Compatible    URINALYSIS W/ RFLX MICROSCOPIC    Collection Time: 09/03/20 12:28 AM   Result Value Ref Range    Color YELLOW      Appearance CLEAR      Specific gravity 1.016 1.001 - 1.023      pH (UA) 6.0 5.0 - 9.0      Protein Negative NEG mg/dL    Glucose Negative mg/dL    Ketone 15 (A) NEG mg/dL    Bilirubin Negative NEG      Blood Negative NEG      Urobilinogen 0.2 0.2 - 1.0 EU/dL    Nitrites Negative NEG      Leukocyte Esterase Negative NEG     OCCULT BLOOD, STOOL    Collection Time: 09/03/20 12:28 AM   Result Value Ref Range    Occult blood, stool Positive (A) NEG     HGB & HCT    Collection Time: 09/03/20  3:41 AM   Result Value Ref Range    HGB 8.5 (L) 13.6 - 17.2 g/dL    HCT 25.2 (L) 41.1 - 40.0 %   METABOLIC PANEL, BASIC    Collection Time: 09/03/20  3:41 AM   Result Value Ref Range    Sodium 139 136 - 145 mmol/L    Potassium 4.7 3.5 - 5.1 mmol/L    Chloride 107 98 - 107 mmol/L    CO2 26 21 - 32 mmol/L    Anion gap 6 (L) 7 - 16 mmol/L    Glucose 136 (H) 65 - 100 mg/dL    BUN 17 8 - 23 MG/DL    Creatinine 1.11 0.8 - 1.5 MG/DL    GFR est AA >60 >60 ml/min/1.73m2    GFR est non-AA >60 >60 ml/min/1.73m2    Calcium 8.5 8.3 - 10.4 MG/DL       Imaging:  XR CHEST SNGL V [[id number]]  Collected: 09/04/20 0846    Order Status: Completed  Updated: 09/04/20 0849    Narrative:      History: Hypoxia, crackles     Exam: portable chest     Comparison: CT chest dated 9/3/2020     Findings: Persistent airspace opacity seen at the left lung base which could   represent scarring or atelectasis. Otherwise the lungs are clear. The   mediastinal contour and osseous structures are stable in appearance. Impressions: Stable portable chest      CT CHEST W CONT [[id number]]  Collected: 09/03/20 1536    Order Status: Completed  Updated: 09/03/20 1547    Narrative:      EXAMINATION: CT CHEST WITH IV CONTRAST 9/3/2020 3:27 PM     ACCESSION NUMBER: [id number]     INDICATION: metastatic pancreatic cancer     COMPARISON: CT abdomen and pelvis, 8/17/2020     TECHNIQUE: Multiple contiguous axial CT images of the chest were obtained from   the lung apices to the lung bases after the intravenous administration of 80   contrast material .     Radiation dose reduction techniques were used for this study:  Our CT scanners   use one or all of the following: Automated exposure control, adjustment of the   mA and/or kVp according to patient's size, iterative reconstruction. FINDINGS:   LUNGS/PLEURA:The central airways are patent. There are a few scattered   peripheral and triangular or perifissural small nodules that likely represent   intraparenchymal lymph nodes. No other suspicious pulmonary nodules. No   consolidation, pleural effusion, or pneumothorax. Minimal scarring in the   lingula and lung bases. MEDIASTINUM:The visualized thyroid and thoracic esophagus are unremarkable.  No   mediastinal or hilar lymphadenopathy. The heart and great vessels are normal in   size. No pericardial effusion. Mild coronary artery calcifications. BONES AND SOFT TISSUES: Subcutaneous soft tissues are within normal limits. No   acute or aggressive osseous abnormality. VISUALIZED UPPER ABDOMEN: There are numerous hypodense liver lesions that have   increased in both size and number since 8/17/2020. There are surgical clips in   the region of the common bile duct and ampulla. . Partially visualized contrast   in the left renal collecting system that is mildly dilated, likely secondary to   mass effect from a large parapelvic cyst.     OSSEOUS STRUCTURES: No suspicious lytic or blastic bony lesions. Impression:      IMPRESSION:     1. A few scattered pulmonary nodules that are most consistent with normal   intraparenchymal lymph nodes. No definite metastatic disease to the chest.   2. Interval progression of metastatic disease to the liver. IR INSERT TUNL CVC W PORT OVER 5 YEARS [[id number]]     Order Status: No result     IR [facility] [[id number]]  Collected: 09/03/20 1042    Order Status: Completed  Updated: 09/03/20 1329    Narrative:      PROCEDURE: Mesenteric angiography and interventions     Procedural Personnel   Attending physician(s): Mackenzie Fierro M.D. Pre-procedure diagnosis: Very pleasant 41-year-old gentleman with recently   diagnosed pancreatic cancer admitted for gastrointestinal hemorrhage,   hypotension, acute blood loss anemia thought to be due to duodenal ulcer from   tumor erosion. Post-procedure diagnosis: Same   Indication: Gastrointestinal bleeding   Additional clinical history: None     Complications: No immediate complications. Impression:      IMPRESSION:  Angiography of the mesenteric arteries demonstrates tumor blush   arising from the superior pancreaticoduodenal artery as well as an abnormal   appearance to the midportion of the gastroduodenal artery.  Both of these   arteries coil embolized to stasis. Plan:  3 hours bedrest recovery.  Begin ambulation later today.  Clear liquid   diet, advanced as tolerated.  The patient is tentatively scheduled for chest   port placement tomorrow. _______________________________________________________________     PROCEDURE SUMMARY:   - Arterial access with ultrasound guidance   - Selective mesenteric angiography: Celiac and superior mesenteric angiography   - Superselective mesenteric angiography: Performed as described below   - Mesenteric arterial interventions as described below   - Additional procedure(s): Arterial closure device     PROCEDURE DETAILS:     Pre-procedure   Consent:  Informed written and oral consent for the procedure was obtained after   explanation of risks (including, but not limitted to:  hemorrhage, infection,   vascular injury) benefits and alternatives.  The patient's questions were   answered to their satisfaction. Rubi Beaulieu stated understanding and requested that we   proceed. Final verification:  A time-out identifying the patient and planned procedure   was performed prior to this procedure.       Preparation (MIPS):  Maximal sterile barrier technique (including:  Sterile   Ultrasound probe cover, Sterile Ultrasound gel, cap, mask, sterile gown, sterile   gloves, sterile drape, hand hygiene, and 2% chlorhexidine cutaneous antisepsis)   was used.       Anesthesia/sedation   Level of anesthesia/sedation: Moderate sedation (conscious sedation)   Anesthesia/sedation administered by: Independent trained observer under   attending supervision with continuous monitoring of the patient?s level of   consciousness and physiologic status   Total intra-service sedation time (minutes): 83     Access   Local anesthesia was administered. The vessel was sonographically evaluated and   judged to be patent. Real time ultrasound was used to visualize needle entry   into the vessel and a permanent image was stored.  A 5-Albanian sheath was placed. Vessel accessed: Right common femoral artery   Access technique: Micropuncture set with 21 gauge needle     Aortography   Indication for aortography: Not performed   Vessel catheterized: Not performed   Findings: Not applicable     Mesenteric angiography and interventions   The mesenteric arterial system was catheterized using a 5-Albanian Cobra catheter   and Glidewire. Indication for angiography: Diagnostic angiography - There was no prior   catheter-based angiographic study available and a full diagnostic study was   performed. The decision to intervene was based on the diagnostic study. Variant anatomy: The superior pancreaticoduodenal artery shares an origin in the   cystic artery from the proximal-most gastroduodenal artery. Vessel catheterized: Common hepatic artery   Findings: Spasm versus extrinsic compression of the midportion of the   gastroduodenal artery identified on initial arteriography.  Widely patent proper   hepatic artery and intrahepatic arterial branches.  Large diameter, well formed   superior pancreaticoduodenal artery with 2 areas of tortuosity/stenosis     Vessel catheterized: Gastroduodenal artery   Findings: Persistent narrowing in the midportion.  No definite tumor blush   identified.  Again, the superior pancreaticoduodenal artery is visualized as   described above. Embolization location: Gastroduodenal artery   Embolization catheter: 2.4-Albanian ProGreat microcatheter   Embolization type: Interlock coils   Post-intervention angiography: Described below   Vessel catheterized: Gastroduodenal artery   Findings: Stasis of flow in the main portion of the gastroduodenal artery.  Flow   is preserved in the superior pancreaticoduodenal artery which will be embolized   as described below.      Vessel catheterized: Superior pancreaticoduodenal artery   Findings: Large diameter artery with 2 areas of high-grade stenosis.  Abnormal   perforating branch arises from the segment between the stenoses and results in   tumor blush and probable pooling of contrast within the ulcer crater. Embolization location: Superior pancreaticoduodenal artery   Embolization catheter: 2.4-Zimbabwean ProGreat microcatheter   Embolization type: Interlock coils   Post-intervention angiography: Described below   Vessel catheterized: Gastroduodenal artery   Findings: Stasis of flow within the gastroduodenal artery and within the   superior pancreaticoduodenal artery.  Flow is preserved into the cystic artery. Vessel catheterized: Superior mesenteric artery   Arteriographic catheter: 5-Zimbabwean Cobra catheter   Findings: Widely patent superior mesenteric artery, right colic artery,   ileocolic artery, and mesenteric branches.  No flow identified within the   gastroduodenal artery or superior pancreaticoduodenal artery. Vessel catheterized: Right external iliac artery   Findings: Smooth-walled and widely patent right external iliac and common   femoral artery.  The visualized portions of profunda femoral and superficial   femoral arteries are patent.        Closure   Access site angiography performed: Yes   Findings: Patent vessel with appropriate access level   Arterial closure technique: Perclose   Hemostasis achieved from closure technique: Yes   Duration of manual compression (minutes): 1     Contrast   Contrast agent: Isovue-300   Contrast volume (mL): 200     Radiation Dose   Radiation Exposure Indices:   Reference Air Kerma (Ka,r) = 3574 mGy   Dose Area Product/Kerma Area Product (DAP/TRINITY/PKA) = 94,437.91 cGy-cm2   Fluoroscopy Exposure Time = 19 minutes 42 seconds   Fluorographic Images = 20 arteriograms     Additional Details   Additional description of procedure: None   Equipment details: None   Specimens removed: None   Estimated blood loss (mL): Less than 10   Standardized report: [email or web address]          ASSESSMENT:  Problem List  Date Reviewed: 8/31/2020 Codes Class Noted    Hematemesis ICD-10-CM: K92.0  ICD-9-CM: 578.0  9/2/2020        * (Principal) Acute blood loss anemia ICD-10-CM: D62  ICD-9-CM: 285.1  9/2/2020        Pancreatic cancer ([facility] ICD-10-CM: C25.9  ICD-9-CM: 157.9  9/2/2020        Hypotension ICD-10-CM: I95.9  ICD-9-CM: 458.9  9/2/2020        Duodenal mass ICD-10-CM: K31.89  ICD-9-CM: 537.89  9/2/2020        Iron deficiency anemia ICD-10-CM: D50.9  ICD-9-CM: 280.9  9/1/2020        Malignant neoplasm of head of pancreas ([facility] ICD-10-CM: C25.0  ICD-9-CM: 157.0  8/31/2020        Duodenal ulcer ICD-10-CM: K26.9  ICD-9-CM: 532.90  8/12/2020        Dietz's esophagus without dysplasia ICD-10-CM: K22.70  ICD-9-CM: 530.85  8/12/2020        Impaired fasting glucose ICD-10-CM: R73.01  ICD-9-CM: 790.21  5/8/2018        Essential hypertension ICD-10-CM: I10  ICD-9-CM: 401.9  5/8/2018        Mixed hyperlipidemia ICD-10-CM: E78.2  ICD-9-CM: 272.2  5/8/2018        PAC (premature atrial contraction) ICD-10-CM: I49.1  ICD-9-CM: 427.61  5/8/2018        Overweight (BMI 25.0-29. 9) ICD-10-CM: UQL4106  ICD-9-CM: Frances Velazquez  5/8/2018                RECOMMENDATIONS:  Presumed metastatic pancreatic cancer  - IHC/molecular studies pending - results will determine treatment options  - Per GI, will need to begin chemo and/or XRT asap to avoid recurrent bleeding  - Port placement today  - Completed CT chest for staging - no definite met dz in chest; does show interval progression of met dz to liver    Melena / Hematemesis  - s/p mesenteric arteriogram with coil embolization 9/3  - continue to monitor for bleeding and transfuse prn. If bleeding persists, consult Rad Onc for palliative XRT. Anemia r/t acute blood loss and iron deficiency  - Pt is iron deficient with Tsat 3% and ferritin 20. Rec'd Infed 9/3.  - Transfuse prn to keep Hgb >8    Hypoxia  - mgmt per primary team  - CXR stable    Lab studies and imaging studies were personally reviewed.   Thank you for allowing us to participate in the care of Mr. Nance. Mr. Cade Ortiz will need to f/u with Dr. Beti Valencia on 9/11 as previously scheduled. Jill Norwood NP   [facility]  [id number] [id number] [facility]  Office : [phone]  Fax : [phone]       Attending Addendum:  I have personally performed a face to face diagnostic evaluation on this patient. I have reviewed and agree with the care plan as documented above by Jill Norwood N.P.  My findings are as follows: Patient appears stable, heart rate regular without murmurs, abdomen is non-tender, bowel sounds are positive. 60 male, recent diagnosis of pancreatic mass with duodenal ulcerated lesion (involving more than 3/4 of circumference) and hepatic metastasis, felt to likely represent metastatic pancreatic cancer, though IHC are pending, now admitted with single episode of hematemesis. Subsequently started on IV Protonix and octreotide. He is now status post IR directed angiography with embolization of likely pancreatic mass that has eroded into duodenum. He is also status post port placement this a.m. For his ANGY, he is s/p infed. CT chest for staging w scattered pulmonary nodules. Patient is now being monitored for any further significant bleeding. Ultimately he will benefit from starting systemic palliative chemotherapy in hopes of shrinking underlying malignancy, and this should help prevent further bleeding as well. Patient is scheduled to be seen in outpatient oncology 9/11/2020 with systemic therapy initiation the following day, he was encouraged to keep this appointment. If significant bleeding recurs then surgical opinion may need to be sought. Palliative XRT to region may also be another option. Thank you for allowing us to participate in care of this pleasant patient. Please call w any questions.     Mortimer Rosa, MD [place]   [address] [facility]  Office : [phone]  Fax : [phone]

## 2020-09-03 NOTE — ED NOTES
This rn spiked blood bag. Pramod went through blood bag and blood began leaking. attempted to tape bag, but blood continuously leaking. Blood transfusion unable to be started. Notified blood bank and charge rn.

## 2020-09-03 NOTE — ED NOTES
TRANSFER - OUT REPORT:    Verbal report given to cee wayne(name) on [employer]  being transferred to 3rd floor(unit) for routine progression of care       Report consisted of patients Situation, Background, Assessment and   Recommendations(SBAR). Information from the following report(s) SBAR, Kardex, ED Summary, Intake/Output, MAR and Recent Results was reviewed with the receiving nurse. Lines:   Peripheral IV Left Forearm (Active)   Site Assessment Clean, dry, & intact 09/02/20 1920   Phlebitis Assessment 0 09/02/20 1920   Infiltration Assessment 0 09/02/20 1920   Dressing Status Clean, dry, & intact 09/02/20 1920       Peripheral IV Right Antecubital (Active)       Peripheral IV Right Arm (Active)   Site Assessment Clean, dry, & intact 09/03/20 0429   Phlebitis Assessment 0 09/03/20 0429   Infiltration Assessment 0 09/03/20 0429   Dressing Status Clean, dry, & intact 09/03/20 0429        Opportunity for questions and clarification was provided.       Patient transported with:  transport

## 2020-09-03 NOTE — ROUTINE PROCESS
Bedside and Verbal shift change report to be given to self (oncoming nurse) by Lacy Cordero RN (offgoing nurse). Report included the following information SBAR, Kardex and MAR. Gema phone number is [phone]. Phone number has been placed in the chart. Spoke with her at [place], very thankful, no other questions at this time.

## 2020-09-03 NOTE — PROGRESS NOTES
Attempted to see patient for consult along with Dr. Francisca Feng. Pt off floor for procedure. We will follow up tomorrow.       Demario Leach NP  [facility] Hematology & Oncology

## 2020-09-03 NOTE — PROGRESS NOTES
Patients daughter in law attempted to call the floor for information. Has security code, phone number not in chart. Will update when she calls back. Wife at the bedside at this time.

## 2020-09-03 NOTE — ROUTINE PROCESS
TRANSFER - IN REPORT: 
 
Verbal report received from [facility] on [employer] being received from ER  for routine progression of care. Report consisted of patients Situation, Background, Assessment and Recommendations(SBAR). Information from the following report(s) SBAR, Kardex, [facility] and Recent Results was reviewed. Opportunity for questions and clarification was provided. Assessment completed upon patients arrival to unit and care assumed. Patient received to room 329. Patient connected to monitor and assessment completed. Plan of care reviewed. Patient oriented to room and call light. Patient aware to use call light to communicate any chest pain or needs.

## 2020-09-03 NOTE — PROGRESS NOTES
TRANSFER - OUT REPORT:    Verbal report given to Migue Kay on [employer]  being transferred to IR room #5 for routine progression of care       Report consisted of patients Situation, Background, Assessment and   Recommendations(SBAR). Information from the following report(s) SBAR, Kardex, Procedure Summary and MAR was reviewed with the receiving nurse. Lines:   Peripheral IV Left Forearm (Active)   Site Assessment Clean, dry, & intact 09/03/20 0634   Phlebitis Assessment 0 09/03/20 0634   Infiltration Assessment 0 09/03/20 0634   Dressing Status Clean, dry, & intact 09/03/20 0634   Dressing Type Transparent;Tape 09/03/20 0634   Hub Color/Line Status Patent 09/03/20 0634       Peripheral IV Right Antecubital (Active)   Site Assessment Clean, dry, & intact 09/03/20 0634   Phlebitis Assessment 0 09/03/20 0634   Infiltration Assessment 0 09/03/20 0634   Dressing Status Clean, dry, & intact 09/03/20 0634   Dressing Type Transparent;Tape 09/03/20 0634   Hub Color/Line Status Patent 09/03/20 0634       Peripheral IV Right Arm (Active)   Site Assessment Clean, dry, & intact 09/03/20 0634   Phlebitis Assessment 0 09/03/20 0634   Infiltration Assessment 0 09/03/20 0634   Dressing Status Clean, dry, & intact 09/03/20 0634   Dressing Type Transparent;Tape 09/03/20 0634   Hub Color/Line Status Patent; Flushed 09/03/20 5609        Opportunity for questions and clarification was provided.       Patient transported with:   Registered Nurse

## 2020-09-03 NOTE — PROGRESS NOTES
Spiritual Care visit. Initial Visit, Pre Surgery Consult. Visit and praye in IR before patient goes to surgery.     Visit by Lori Courtney M.Ed., Th.B. ,Staff

## 2020-09-03 NOTE — ED NOTES
Spoke with austin in lab regarding blood transfusion orders. Order placed to get second unit of blood ready.

## 2020-09-03 NOTE — ROUTINE PROCESS
Wife was updated on patient condition and room #329. Wife's phone number is [phone]. Security code [id number] was given.

## 2020-09-03 NOTE — PROGRESS NOTES
Department of Interventional Radiology  [phone]        Consult Note     Patient: Nancy Borden MRN: [id number]  SSN: [id number]    YOB: 1955  Age: 60 y.o. Sex: male      Referring Physician: Mamta Izquierdo    Consult Date: 9/3/2020     Subjective:     Chief Complaint: GI Bleed    History of Present Illness: Nancy Borden is a pleasant 60 y.o. male with a newly diagnosed pancreatic cancer that has eroded into the duodenum causing stricture. Hemorrhage is either into the duodenum (most likely) or into the pancreatic duct. Hemodynamically stable now.       Past Medical History:   Diagnosis Date    Cancer [facility])     pancreatic    GERD (gastroesophageal reflux disease)     Hernia      Past Surgical History:   Procedure Laterality Date    HX CATARACT REMOVAL Left 12/15/2017    HX HERNIA REPAIR      HX ORTHOPAEDIC Right     tibia- with hardware      Family History   Problem Relation Age of Onset    No Known Problems Mother     Cancer Father 72         leukemia    Diabetes Sister     No Known Problems Brother     No Known Problems Sister     Cancer Sister      Social History     Tobacco Use    Smoking status: Former Smoker     Packs/day: 1.00     Years: 10.00     Pack years: 10.00     Last attempt to quit: 2006     Years since quittin.6    Smokeless tobacco: Never Used   Substance Use Topics    Alcohol use: Not Currently      Allergies   Allergen Reactions    Tetanus And Diphtheria Toxoids Swelling     As child     Current Facility-Administered Medications   Medication Dose Route Frequency Provider Last Rate Last Dose    ondansetron (ZOFRAN) injection 4 mg  4 mg IntraVENous Q6H PRN Rishabh Dowell MD        [facility]) with saline injection 12.5 mg  12.5 mg IntraVENous Q6H PRN Rishabh Dowell MD        0.9% sodium chloride infusion  75 mL/hr IntraVENous CONTINUOUS Rishabh Dowell MD 75 mL/hr at 20 0045 75 mL/hr at 09/03/20 0045    HYDROmorphone (PF) (DILAUDID) injection 1 mg  1 mg IntraVENous Q4H PRN Manjula Feliciano MD        acetaminophen (TYLENOL) suppository 650 mg  650 mg Rectal Q4H PRN Manjula Feliciano MD        enalaprilat (VASOTEC) injection 1.25 mg  1.25 mg IntraVENous Q6H PRN Manjula Feliciano MD        0.9% sodium chloride infusion  25 mL/hr IntraVENous ONCE Fernando Cooney MD        diphenhydrAMINE (BENADRYL) injection 12.5-50 mg  12.5-50 mg IntraVENous ONCE Fernando Cooney MD        fentaNYL citrate (PF) injection 25-50 mcg  25-50 mcg IntraVENous Multiple Fernando Cooney MD        midazolam (VERSED) injection 0.5-2 mg  0.5-2 mg IntraVENous Multiple Fernando Cooney MD        iopamidoL (ISOVUE 300) 61 % contrast injection 1-20 mL  1-20 mL IntraCATHeter ONCE Fernando Cooney MD        lidocaine (XYLOCAINE) 20 mg/mL (2 %) injection 4 mg  0.2 mL IntraDERMal ONCE Fernando Cooney MD        heparin (PF) 2 units/ml in NS infusion 2,000-8,000 Units  1,000-4,000 mL Irrigation PRN Fernando Cooney MD        octreotide (SANDOSTATIN) 500 mcg in 0.9% sodium chloride 500 mL infusion  50 mcg/hr IntraVENous CONTINUOUS LineDl wasserman DO 50 mL/hr at 09/02/20 2039 50 mcg/hr at 09/02/20 2039    0.9% sodium chloride infusion 250 mL  250 mL IntraVENous PRN Dl Glaser DO        pantoprazole (PROTONIX) 80 mg in 0.9% sodium chloride 20 mL injection  80 mg IntraVENous Q12H Dl Glaser DO           Medications Prior to Admission   Medication Sig    traMADoL (ULTRAM-ER) 100 mg Tb24     prochlorperazine (Compazine) 10 mg tablet Take 1 Tab by mouth every six (6) hours as needed for Nausea. Indications: prevent nausea and vomiting from cancer chemotherapy    ondansetron hcl (Zofran) 8 mg tablet Take 1 Tab by mouth every eight (8) hours as needed for Nausea.  Indications: prevent nausea and vomiting from cancer chemotherapy    lidocaine-prilocaine (EMLA) topical cream Apply to port about 45 minutes prior to access    lisinopriL (PRINIVIL, ZESTRIL) 10 mg tablet Take 1 Tab by mouth daily. (Patient taking differently: Take 10 mg by mouth nightly.)    pantoprazole (PROTONIX) 40 mg tablet Take 40 mg by mouth two (2) times a day.  vit B Cmplx 3-FA-Vit C-Biotin (NEPHRO VERÓNICA RX) 1- mg-mg-mcg tablet Take 1 Tab by mouth daily.  cholecalciferol (VITAMIN D3) 1,000 unit tablet Take 1,000 Units by mouth daily. Allergies   Allergen Reactions    Tetanus And Diphtheria Toxoids Swelling     As child     Review of Systems:  A detailed 10 organ review of systems is obtained with pertinent positives as listed in the History of Present Illness and Past Medical History. All others are negative. Objective:     Physical Exam:  Vitals:    09/03/20 0439 09/03/20 0634 09/03/20 0745 09/03/20 0746   BP: 144/85 165/75  177/74   Pulse: 75 (!) 101  91   Resp: 18 18  18   Temp: 99.3 °F (37.4 °C)   99.4 °F (37.4 °C)   SpO2: 99% 96%  98%   Weight:  70.6 kg (155 lb 9.6 oz) 70.3 kg (155 lb)    Height:  5' 6\" (1.676 m) 5' 6\" (1.676 m)       Pain Assessment  Pain Intensity 1: 0 (09/03/20 0745)  Pain Location 1: Abdomen     Patient Stated Pain Goal: 0    EXTREMITIES: 2+ R CFA and PTA pulses  Lab/Data Review: All lab results for the last 24 hours reviewed.     Assessment/Plan:     Hospital Problems  Date Reviewed: 8/31/2020          Codes Class Noted POA    Hematemesis ICD-10-CM: K92.0  ICD-9-CM: 578.0  9/2/2020 Unknown        * (Principal) Acute blood loss anemia ICD-10-CM: D62  ICD-9-CM: 285.1  9/2/2020 Unknown        Pancreatic cancer ([facility] [address] ICD-10-CM: C25.9  ICD-9-CM: 157.9  9/2/2020 Unknown        Hypotension ICD-10-CM: I95.9  ICD-9-CM: 458.9  9/2/2020 Unknown        Duodenal mass ICD-10-CM: K31.89  ICD-9-CM: 537.89  9/2/2020 Unknown        [facility] ICD-10-CM: C25.0  ICD-9-CM: 157.0  8/31/2020 Yes        Duodenal ulcer ICD-10-CM: K26.9  ICD-9-CM: 532.90 8/12/2020 Yes        Essential hypertension ICD-10-CM: I10  ICD-9-CM: 401.9  5/8/2018 Yes

## 2020-09-03 NOTE — H&P
History and Physical    Patient: Sahra Thompson MRN: [id number]  SSN: [id number]    YOB: 1955  Age: 60 y.o. Sex: male      Subjective:    CC: \" I am bleeding\"      Sahra Thompson is a 60 y.o. 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 He complains of chronic LLQ dull like pain, 5/10 in intensity for the past 3 months. His current condition is associated with burping and nausea. The patient had plans to have a port placed tomorrow in anticipation for chemotherapy. He has not been taking any NSAIDS, nor does not drink alcohol. En route, EMS provided 250 ml NS due to hypotension     Upon ed arrival VS: bp 87/49; hr 90. On room air, speaking in full sentences  He had 1 episode of hematemesis in the ED. ED MD started protonix iv and octreotide. Labs: wbc 19k; hb: 6.3 (On 20, he had 9gr ); INR: 1.2  Chemistry: unrevealing    GI has seen and suggested ICU admission. IR was contacted and plan is for angiography with embolization tomorrow morning, it can be done sooner if bleeding ensues again or any decompensation on his medical condition.     ROS: as above  Social hx: former smoker, alcohol use-  Family hx: his father had leukemia    Past Medical History:   Diagnosis Date    Cancer [facility])     pancreatic    GERD (gastroesophageal reflux disease)     Hernia      Past Surgical History:   Procedure Laterality Date    HX CATARACT REMOVAL Left 12/15/2017    HX HERNIA REPAIR      HX ORTHOPAEDIC Right     tibia- with hardware      Family History   Problem Relation Age of Onset    No Known Problems Mother     Cancer Father 72         leukemia    Diabetes Sister     No Known Problems Brother     No Known Problems Sister     Cancer Sister      Social History     Tobacco Use    Smoking status: Former Smoker     Packs/day: 1.00     Years: 10.00     Pack years: 10.00     Last attempt to quit: 2006     Years since quittin.6    Smokeless tobacco: Never Used   Substance Use Topics    Alcohol use: Not Currently      Prior to Admission medications    Medication Sig Start Date End Date Taking? Authorizing Provider   traMADoL (ULTRAM-ER) 100 mg Tb24  20   Provider, Historical   prochlorperazine (Compazine) 10 mg tablet Take 1 Tab by mouth every six (6) hours as needed for Nausea. Indications: prevent nausea and vomiting from cancer chemotherapy 20   Corey Canales MD   ondansetron hcl (Zofran) 8 mg tablet Take 1 Tab by mouth every eight (8) hours as needed for Nausea. Indications: prevent nausea and vomiting from cancer chemotherapy 20   Corey Canales MD   lidocaine-prilocaine (EMLA) topical cream Apply to port about 45 minutes prior to access 20   Corey Canales MD   lisinopriL (PRINIVIL, ZESTRIL) 10 mg tablet Take 1 Tab by mouth daily. Patient taking differently: Take 10 mg by mouth nightly. 20   Sandra Herzog MD   pantoprazole (PROTONIX) 40 mg tablet Take 40 mg by mouth two (2) times a day. 20   Provider, Historical   vit B Cmplx 3-FA-Vit C-Biotin (NEPHRO VERÓNICA RX) 1- mg-mg-mcg tablet Take 1 Tab by mouth daily. Provider, Historical   cholecalciferol (VITAMIN D3) 1,000 unit tablet Take 1,000 Units by mouth daily. Provider, Historical        Allergies   Allergen Reactions    Tetanus And Diphtheria Toxoids Swelling     As child       Review of Systems:  A comprehensive review of systems was negative except for that written in the History of Present Illness.     Objective:     Vitals:    20   BP: 92/52 106/53 108/55 111/57   Pulse: 97 75 82 93   Resp:  16 16 18   Temp: 98.6 °F (37 °C) 98.3 °F (36.8 °C) 99.3 °F (37.4 °C)   SpO2: 98% 100% 98% 100%   Weight:       Height:            Physical Exam:  GENERAL: alert, cooperative, no distress, appears stated age  EYE: pale conjunctivas   LYMPHATIC: Cervical, supraclavicular, and axillary nodes normal.   THROAT & NECK: normal and no erythema or exudates noted. LUNG: clear to auscultation bilaterally  HEART: regular rate and rhythm, S1, S2 normal, no murmur, click, rub or gallop  ABDOMEN: soft, non-tender. Bowel sounds normal. No masses,  no organomegaly  EXTREMITIES:  extremities normal, atraumatic, no cyanosis or edema  SKIN: pale palms   NEUROLOGIC: negative  PSYCHIATRIC: non focal    Assessment:     Hospital Problems  Date Reviewed: 8/31/2020          Codes Class Noted POA    Hematemesis ICD-10-CM: K92.0  ICD-9-CM: 578.0  9/2/2020 Unknown        * (Principal) Acute blood loss anemia ICD-10-CM: D62  ICD-9-CM: 285.1  9/2/2020 Unknown        Pancreatic cancer ([facility] ICD-10-CM: C25.9  ICD-9-CM: 157.9  9/2/2020 Unknown        Hypotension ICD-10-CM: I95.9  ICD-9-CM: 458.9  9/2/2020 Unknown        Duodenal mass ICD-10-CM: K31.89  ICD-9-CM: 537.89  9/2/2020 Unknown        Malignant neoplasm of head of pancreas ([facility] ICD-10-CM: C25.0  ICD-9-CM: 157.0  8/31/2020 Yes        Duodenal ulcer ICD-10-CM: K26.9  ICD-9-CM: 532.90  8/12/2020 Yes        Essential hypertension ICD-10-CM: I10  ICD-9-CM: 401.9  5/8/2018 Yes              Plan:   -Acute blood loss anemia, secondary likely to ulcerated duodenal mass  -Symptomatic anemia, with hypotension  -Hematemesis    Admit under ICU  Keep NPO  Continue iv PPI, octreotide  NS ivfs  Transfuse 2 PRBC   Monitor HH q 6hrs and transfuse prn  GI following. Plan for IR-angiography and embolization in am, or sooner if deconditioning of his state    -Adenocarcinoma of the head of the pancreas with liver mets:  Oncology consult.  He had plans for port on 9/3    -HTN: hold lisinopril for now    DVT ppx: compression stockings    Code status: full. Confirmed with the patient and his wife over the phone    Estimated LOS > 2MN  Risk: high  Estimated DC planning: too soon to determine  Goals of care discussed with the patient and his wife    Emergency contact: wife: Remi Austin: [phone]    Critical time spent with the patient, discussing goals of care and plan to him and his wife ~ 36 min.     Signed By: Amy Harrington MD     September 2, 2020

## 2020-09-04 ENCOUNTER — APPOINTMENT (OUTPATIENT)
Dept: GENERAL RADIOLOGY | Age: 65
DRG: 357 | End: 2020-09-04
Attending: INTERNAL MEDICINE
Payer: COMMERCIAL

## 2020-09-04 ENCOUNTER — APPOINTMENT (OUTPATIENT)
Dept: INTERVENTIONAL RADIOLOGY/VASCULAR | Age: 65
DRG: 357 | End: 2020-09-04
Attending: INTERNAL MEDICINE
Payer: COMMERCIAL

## 2020-09-04 LAB
ALBUMIN SERPL-MCNC: 2.2 G/DL (ref 3.2–4.6)
ALBUMIN/GLOB SERPL: 0.8 {RATIO} (ref 1.2–3.5)
ALP SERPL-CCNC: 64 U/L (ref 50–136)
ALT SERPL-CCNC: 18 U/L (ref 12–65)
ANION GAP SERPL CALC-SCNC: 7 MMOL/L (ref 7–16)
AST SERPL-CCNC: 37 U/L (ref 15–37)
BASOPHILS # BLD: 0.1 K/UL (ref 0–0.2)
BASOPHILS NFR BLD: 1 % (ref 0–2)
BILIRUB SERPL-MCNC: 0.6 MG/DL (ref 0.2–1.1)
BUN SERPL-MCNC: 14 MG/DL (ref 8–23)
CALCIUM SERPL-MCNC: 7.4 MG/DL (ref 8.3–10.4)
CHLORIDE SERPL-SCNC: 105 MMOL/L (ref 98–107)
CO2 SERPL-SCNC: 25 MMOL/L (ref 21–32)
CREAT SERPL-MCNC: 1.03 MG/DL (ref 0.8–1.5)
DIFFERENTIAL METHOD BLD: ABNORMAL
EOSINOPHIL # BLD: 0.9 K/UL (ref 0–0.8)
EOSINOPHIL NFR BLD: 8 % (ref 0.5–7.8)
ERYTHROCYTE [DISTWIDTH] IN BLOOD BY AUTOMATED COUNT: 15.6 % (ref 11.9–14.6)
GLOBULIN SER CALC-MCNC: 2.9 G/DL (ref 2.3–3.5)
GLUCOSE SERPL-MCNC: 148 MG/DL (ref 65–100)
HCT VFR BLD AUTO: 20.6 % (ref 41.1–50.3)
HCT VFR BLD AUTO: 24.1 % (ref 41.1–50.3)
HCT VFR BLD AUTO: 24.8 % (ref 41.1–50.3)
HGB BLD-MCNC: 6.8 G/DL (ref 13.6–17.2)
HGB BLD-MCNC: 7.6 G/DL (ref 13.6–17.2)
HGB BLD-MCNC: 8.1 G/DL (ref 13.6–17.2)
IMM GRANULOCYTES # BLD AUTO: 0.1 K/UL (ref 0–0.5)
IMM GRANULOCYTES NFR BLD AUTO: 1 % (ref 0–5)
LYMPHOCYTES # BLD: 0.8 K/UL (ref 0.5–4.6)
LYMPHOCYTES NFR BLD: 7 % (ref 13–44)
MCH RBC QN AUTO: 28.5 PG (ref 26.1–32.9)
MCHC RBC AUTO-ENTMCNC: 33 G/DL (ref 31.4–35)
MCV RBC AUTO: 86.2 FL (ref 79.6–97.8)
MONOCYTES # BLD: 1.4 K/UL (ref 0.1–1.3)
MONOCYTES NFR BLD: 13 % (ref 4–12)
NEUTS SEG # BLD: 8.1 K/UL (ref 1.7–8.2)
NEUTS SEG NFR BLD: 71 % (ref 43–78)
NRBC # BLD: 0 K/UL (ref 0–0.2)
PLATELET # BLD AUTO: 187 K/UL (ref 150–450)
PMV BLD AUTO: 10 FL (ref 9.4–12.3)
POTASSIUM SERPL-SCNC: 3.4 MMOL/L (ref 3.5–5.1)
PROT SERPL-MCNC: 5.1 G/DL (ref 6.3–8.2)
RBC # BLD AUTO: 2.39 M/UL (ref 4.23–5.6)
SODIUM SERPL-SCNC: 137 MMOL/L (ref 136–145)
WBC # BLD AUTO: 11.4 K/UL (ref 4.3–11.1)

## 2020-09-04 PROCEDURE — 36430 TRANSFUSION BLD/BLD COMPNT: CPT

## 2020-09-04 PROCEDURE — P9016 RBC LEUKOCYTES REDUCED: HCPCS

## 2020-09-04 PROCEDURE — 36415 COLL VENOUS BLD VENIPUNCTURE: CPT

## 2020-09-04 PROCEDURE — 74011250636 HC RX REV CODE- 250/636: Performed by: INTERNAL MEDICINE

## 2020-09-04 PROCEDURE — 77001 FLUOROGUIDE FOR VEIN DEVICE: CPT

## 2020-09-04 PROCEDURE — 85025 COMPLETE CBC W/AUTO DIFF WBC: CPT

## 2020-09-04 PROCEDURE — 0JH60WZ INSERTION OF TOTALLY IMPLANTABLE VASCULAR ACCESS DEVICE INTO CHEST SUBCUTANEOUS TISSUE AND FASCIA, OPEN APPROACH: ICD-10-PCS | Performed by: RADIOLOGY

## 2020-09-04 PROCEDURE — 85018 HEMOGLOBIN: CPT

## 2020-09-04 PROCEDURE — 02H633Z INSERTION OF INFUSION DEVICE INTO RIGHT ATRIUM, PERCUTANEOUS APPROACH: ICD-10-PCS | Performed by: RADIOLOGY

## 2020-09-04 PROCEDURE — C9113 INJ PANTOPRAZOLE SODIUM, VIA: HCPCS | Performed by: INTERNAL MEDICINE

## 2020-09-04 PROCEDURE — 36592 COLLECT BLOOD FROM PICC: CPT

## 2020-09-04 PROCEDURE — 74011000250 HC RX REV CODE- 250: Performed by: EMERGENCY MEDICINE

## 2020-09-04 PROCEDURE — 65270000029 HC RM PRIVATE

## 2020-09-04 PROCEDURE — C1788 PORT, INDWELLING, IMP: HCPCS

## 2020-09-04 PROCEDURE — 74011250636 HC RX REV CODE- 250/636: Performed by: RADIOLOGY

## 2020-09-04 PROCEDURE — 74011250636 HC RX REV CODE- 250/636: Performed by: EMERGENCY MEDICINE

## 2020-09-04 PROCEDURE — 77030003028 HC SUT VCRL J&J -A

## 2020-09-04 PROCEDURE — C1894 INTRO/SHEATH, NON-LASER: HCPCS

## 2020-09-04 PROCEDURE — C9113 INJ PANTOPRAZOLE SODIUM, VIA: HCPCS | Performed by: EMERGENCY MEDICINE

## 2020-09-04 PROCEDURE — 77030010507 HC ADH SKN DERMBND J&J -B

## 2020-09-04 PROCEDURE — 71045 X-RAY EXAM CHEST 1 VIEW: CPT

## 2020-09-04 PROCEDURE — 74011000250 HC RX REV CODE- 250: Performed by: RADIOLOGY

## 2020-09-04 PROCEDURE — 80053 COMPREHEN METABOLIC PANEL: CPT

## 2020-09-04 PROCEDURE — 74011250637 HC RX REV CODE- 250/637: Performed by: INTERNAL MEDICINE

## 2020-09-04 PROCEDURE — 99223 1ST HOSP IP/OBS HIGH 75: CPT | Performed by: INTERNAL MEDICINE

## 2020-09-04 PROCEDURE — 74011000250 HC RX REV CODE- 250: Performed by: INTERNAL MEDICINE

## 2020-09-04 RX ORDER — MIDAZOLAM HYDROCHLORIDE 1 MG/ML
.25-2 INJECTION, SOLUTION INTRAMUSCULAR; INTRAVENOUS
Status: DISCONTINUED | OUTPATIENT
Start: 2020-09-04 | End: 2020-09-06 | Stop reason: HOSPADM

## 2020-09-04 RX ORDER — CEFAZOLIN SODIUM/WATER 2 G/20 ML
2 SYRINGE (ML) INTRAVENOUS ONCE
Status: COMPLETED | OUTPATIENT
Start: 2020-09-04 | End: 2020-09-04

## 2020-09-04 RX ORDER — FENTANYL CITRATE 50 UG/ML
12.5-1 INJECTION, SOLUTION INTRAMUSCULAR; INTRAVENOUS
Status: DISCONTINUED | OUTPATIENT
Start: 2020-09-04 | End: 2020-09-06 | Stop reason: HOSPADM

## 2020-09-04 RX ORDER — LIDOCAINE HYDROCHLORIDE AND EPINEPHRINE 20; 10 MG/ML; UG/ML
20-200 INJECTION, SOLUTION INFILTRATION; PERINEURAL ONCE
Status: COMPLETED | OUTPATIENT
Start: 2020-09-04 | End: 2020-09-04

## 2020-09-04 RX ORDER — SODIUM CHLORIDE 9 MG/ML
250 INJECTION, SOLUTION INTRAVENOUS AS NEEDED
Status: DISCONTINUED | OUTPATIENT
Start: 2020-09-04 | End: 2020-09-06 | Stop reason: HOSPADM

## 2020-09-04 RX ORDER — POTASSIUM CHLORIDE 14.9 MG/ML
20 INJECTION INTRAVENOUS
Status: COMPLETED | OUTPATIENT
Start: 2020-09-04 | End: 2020-09-04

## 2020-09-04 RX ORDER — FUROSEMIDE 10 MG/ML
20 INJECTION INTRAMUSCULAR; INTRAVENOUS ONCE
Status: COMPLETED | OUTPATIENT
Start: 2020-09-04 | End: 2020-09-04

## 2020-09-04 RX ORDER — SODIUM CHLORIDE 9 MG/ML
25 INJECTION, SOLUTION INTRAVENOUS CONTINUOUS
Status: DISCONTINUED | OUTPATIENT
Start: 2020-09-04 | End: 2020-09-05

## 2020-09-04 RX ORDER — HEPARIN SODIUM (PORCINE) LOCK FLUSH IV SOLN 100 UNIT/ML 100 UNIT/ML
500 SOLUTION INTRAVENOUS ONCE
Status: COMPLETED | OUTPATIENT
Start: 2020-09-04 | End: 2020-09-04

## 2020-09-04 RX ORDER — LIDOCAINE HYDROCHLORIDE 20 MG/ML
40-120 INJECTION, SOLUTION INFILTRATION; PERINEURAL ONCE
Status: COMPLETED | OUTPATIENT
Start: 2020-09-04 | End: 2020-09-04

## 2020-09-04 RX ORDER — ACETAMINOPHEN 325 MG/1
650 TABLET ORAL
Status: DISCONTINUED | OUTPATIENT
Start: 2020-09-04 | End: 2020-09-06 | Stop reason: HOSPADM

## 2020-09-04 RX ADMIN — CEFAZOLIN 2 G: 10 INJECTION, POWDER, FOR SOLUTION INTRAVENOUS at 11:03

## 2020-09-04 RX ADMIN — LIDOCAINE HYDROCHLORIDE 80 MG: 20 INJECTION, SOLUTION INFILTRATION; PERINEURAL at 11:06

## 2020-09-04 RX ADMIN — OCTREOTIDE ACETATE 50 MCG/HR: 500 INJECTION, SOLUTION INTRAVENOUS; SUBCUTANEOUS at 21:21

## 2020-09-04 RX ADMIN — LIDOCAINE HYDROCHLORIDE AND EPINEPHRINE 160 MG: 20; 10 INJECTION, SOLUTION INFILTRATION; PERINEURAL at 11:06

## 2020-09-04 RX ADMIN — FUROSEMIDE 20 MG: 10 INJECTION INTRAMUSCULAR; INTRAVENOUS at 09:01

## 2020-09-04 RX ADMIN — FENTANYL CITRATE 50 MCG: 50 INJECTION, SOLUTION INTRAMUSCULAR; INTRAVENOUS at 11:05

## 2020-09-04 RX ADMIN — ACETAMINOPHEN 650 MG: 325 TABLET, FILM COATED ORAL at 16:57

## 2020-09-04 RX ADMIN — MIDAZOLAM 1 MG: 1 INJECTION INTRAMUSCULAR; INTRAVENOUS at 11:11

## 2020-09-04 RX ADMIN — POTASSIUM CHLORIDE 20 MEQ: 200 INJECTION, SOLUTION INTRAVENOUS at 09:00

## 2020-09-04 RX ADMIN — PANTOPRAZOLE SODIUM 80 MG: 40 INJECTION, POWDER, FOR SOLUTION INTRAVENOUS at 08:03

## 2020-09-04 RX ADMIN — SODIUM CHLORIDE 40 MG: 9 INJECTION, SOLUTION INTRAMUSCULAR; INTRAVENOUS; SUBCUTANEOUS at 21:17

## 2020-09-04 RX ADMIN — MIDAZOLAM 2 MG: 1 INJECTION INTRAMUSCULAR; INTRAVENOUS at 10:56

## 2020-09-04 RX ADMIN — FENTANYL CITRATE 50 MCG: 50 INJECTION, SOLUTION INTRAMUSCULAR; INTRAVENOUS at 11:04

## 2020-09-04 RX ADMIN — POTASSIUM CHLORIDE 20 MEQ: 200 INJECTION, SOLUTION INTRAVENOUS at 13:46

## 2020-09-04 RX ADMIN — HEPARIN SODIUM (PORCINE) LOCK FLUSH IV SOLN 100 UNIT/ML 500 UNITS: 100 SOLUTION at 11:12

## 2020-09-04 NOTE — ROUTINE PROCESS
Bedside and Verbal shift change report given to Jayda Jenkins (oncoming nurse) by self Rosaura miranda). Report included the following information SBAR, Kardex, Procedure Summary, Intake/Output, MAR and Recent Results.

## 2020-09-04 NOTE — PROGRESS NOTES
Pt's hemaglobin was 6.8 and hematocrit was 20.6  Dr. Arnett Res notified and 1 unit of blood was ordered. Will continue to monitor patient and administer blood.

## 2020-09-04 NOTE — ROUTINE PROCESS
Bedside and Verbal shift change report given to  (oncoming nurse) by Idalia Echevarria RN (offgoing nurse). Report included the following information SBAR, Kardex, Intake/Output, MAR and Recent Results. Blood verified at 150 ml/ hr at bedside

## 2020-09-04 NOTE — PROGRESS NOTES
[facility] Progress Note         Admit Date:  9/2/2020    Today's Date:  9/4/2020    CC:  GI bleed    Subjective:     Patient is s/p IR embolization. Tolerated diet yesterday, now NPO. Denies upper or lower GI complaints at this time. Last BM yest afternoon, dark black, none since. No abdom pain, N/V.           Date of Procedure: 9/3/2020     Pre-Procedure Diagnosis: Pancreatic cancer with intestinal hemorrhage.      Post-Procedure Diagnosis: SAME     Procedure(s): Mesenteric Arteriogram w coil embolization.       Brief Description of Procedure: as above     Performed By: Snow Cloud MD      Assistants: None     Anesthesia:Moderate Sedation     Estimated Blood Loss: Less than 10ml     Specimens:  None     Implants:  Metal coils.      Findings: Tumor blush associated w GDA branches embolized to stasis.        Complications: None     Recommendations: 3 hour bedrest.        Follow Up: Chest port placement tomorrow.       Signed By: Snow Cloud MD      September 3, 2020          Medications:   Current Facility-Administered Medications   Medication Dose Route Frequency    0.9% sodium chloride infusion 250 mL  250 mL IntraVENous PRN    furosemide (LASIX) injection 20 mg  20 mg IntraVENous ONCE    potassium chloride 20 mEq in 100 ml IVPB  20 mEq IntraVENous Q2H    ondansetron (ZOFRAN) injection 4 mg  4 mg IntraVENous Q6H PRN    promethazine (PHENERGAN) with saline injection 12.5 mg  12.5 mg IntraVENous Q6H PRN    HYDROmorphone (PF) (DILAUDID) injection 1 mg  1 mg IntraVENous Q4H PRN    acetaminophen (TYLENOL) suppository 650 mg  650 mg Rectal Q4H PRN    enalaprilat (VASOTEC) injection 1.25 mg  1.25 mg IntraVENous Q6H PRN    octreotide (SANDOSTATIN) 500 mcg in 0.9% sodium chloride 500 mL infusion  50 mcg/hr IntraVENous CONTINUOUS    0.9% sodium chloride infusion 250 mL  250 mL IntraVENous PRN    pantoprazole (PROTONIX) 80 mg in 0.9% sodium chloride 20 mL injection  80 mg IntraVENous Q12H       Review of Systems:  ROS was obtained, with pertinent positives as listed above. No chest pain or SOB. Diet:      Objective:   Vitals:  Visit Vitals  /59   Pulse 65   Temp 99 °F (37.2 °C)   Resp 16   Ht 5' 6\" (1.676 m)   Wt 72.9 kg (160 lb 12.8 oz)   SpO2 99%   BMI 25.95 kg/m²     Intake/Output:  No intake/output data recorded. 09/02 1901 - 09/04 0700  In: 200 [P.O.:200]  Out: 800 [Urine:800]  Exam:  General appearance: alert, cooperative, no distress  Lungs: clear to auscultation bilaterally anteriorly  Heart: regular rate and rhythm  Abdomen: soft, minimal epigastric TTP.   Bowel sounds normal. No masses, no organomegaly  Extremities: extremities normal, atraumatic, no cyanosis or edema  Neuro:  alert and oriented    Data Review (Labs):    Recent Labs     09/04/20  0429 09/03/20 2050 09/03/20  1312 09/03/20  0341 09/02/20  1931   WBC 11.4*  --   --   --  19.3*   HGB 6.8* 7.1* 8.5* 8.5* 6.3*   HCT 20.6* 22.1* 27.1* 25.2* 20.6*     --   --   --  366   MCV 86.2  --   --   --  85.1     --   --  139 139   K 3.4*  --   --  4.7 3.9     --   --  107 104   CO2 25  --   --  26 22   BUN 14  --   --  17 15   CREA 1.03  --   --  1.11 1.21   CA 7.4*  --   --  8.5 8.4   MG  --   --   --   --  1.9   *  --   --  136* 187*   AP 64  --   --   --  71   ALT 18  --   --   --  13   TBILI 0.6  --   --   --  0.2   ALB 2.2*  --   --   --  2.5*   TP 5.1*  --   --   --  5.7*   LPSE  --   --   --   --  504*   PTP  --   --   --   --  15.1*   INR  --   --   --   --  1.2       Assessment:     Principal Problem:    Acute blood loss anemia (9/2/2020)    Active Problems:    Essential hypertension (5/8/2018)      Duodenal ulcer (8/12/2020)      Malignant neoplasm of head of pancreas ([facility] [address] (8/31/2020)      Hematemesis (9/2/2020)      [facility] (9[facility])      Hypotension (9/2/2020)      Duodenal mass (9/2/2020)      57yo male with newly dx'd metastatic pancreatic cancer with ulcerated mass in the duodenum, Who presented with hematemesis, melena, and ABLA.       Plan:     Successful coil embolization of bleeding malignant DU  CT last PM w/ no obvious thoracic disease  Planning for Port placement today  Awaiting Onc eval- hopefully he can begin chemo +/- XRT ASAP- best chance to avoid recurrent bleeding  Tumor bleeding is not amenable to endoscopic Rx  CA 19-9 very high, c/w known metastatic pancreatic cancer  Advance diet after port placement to GI soft  Currently w/o obstructive symptoms  No evidence of biliary obstruction- LFTs remain normal  Cont BID PPI- may provide some decreased risk of bleeding in the short term  WIll sign off, please call if further issues arise  He can follow up with GI as needed    Gladys Mcfadden MD

## 2020-09-04 NOTE — ROUTINE PROCESS
Blood transfusion started at 1630. At [phone] vitals rechecked per protocol and temp 100.7 oral. Paused transfusion and notified MD Kyree Robbins. Per MD give 650 tylenol q6hr PRN and continue transfusion.

## 2020-09-04 NOTE — PROGRESS NOTES
Pt's oxygen sat was 77%. Pt was placed on 3L of O2 via nasal cannula. Pt's O2 stat was then 94%. Dr. Kris King was notified and chest x-ray was ordered and completed. Chest x-ray results are pending. Will continue to monitor.

## 2020-09-04 NOTE — PROGRESS NOTES
Spoke with MD Chantal Nelson, pt states he had one episode of melena/dark stool yesterday evening, no more occurences over night or this morning. MD aware of drop in hgb. IR nurse Kannan Bernal notified of drop in Hgb and RN to notify IR doctors of change in patient condition. MD Chantal Nelson made aware that patient breath sounds crackles/diminished in both middle  And lower lobes. Patient place don O2 overnight to keep O2 sats up. MD to place orders.

## 2020-09-04 NOTE — ROUTINE PROCESS
TRANSFER - IN REPORT: 
 
Verbal report received from [address] RN(name) on [address]  being received from IR(unit) for routine progression of care Report consisted of patients Situation, Background, Assessment and  
Recommendations(SBAR). Information from the following report(s) SBAR, Kardex and MAR was reviewed with the receiving nurse. Opportunity for questions and clarification was provided. Assessment completed upon patients arrival to unit and care assumed.

## 2020-09-04 NOTE — PROGRESS NOTES
Hospitalist Progress Note    2020  Admit Date: 2020  7:08 PM   NAME: Camryn Shafer   :  1955   MRN:  [id number]   Attending: Irvin Cisneros DO  PCP:  Bryan Sosa MD    SUBJECTIVE:   Patient 47T with pmhx of adenocarcinoma of head of pancreas with liver mets, s/p EUS on  that showed large ulcerated duodenal mass extending to duodenal bulb with stricture distally, brought to ER via EMS with report of dark tarry stoos and hematemesis. . Admitted to hospitalist service and started on IV PPI, octreotide gtt. GI, ONc and IR consulted. 9/3 - s/p mesenteric arteriogram w/ coil emoblization by IR. Seen after procedure. Reports episode of melena x1 this afternoon. No abdominal pain, nausesa or vomiting.  - s/p right chest port placement. Pt in good spirits. One episode of dark stool today. hgb 6.8 transfusing 1 unit prbc      Review of Systems negative with exception of pertinent positives noted above  PHYSICAL EXAM     Visit Vitals  BP 93/71   Pulse 69   Temp 100.3 °F (37.9 °C)   Resp 16   Ht 5' 6\" (1.676 m)   Wt 72.9 kg (160 lb 12.8 oz)   SpO2 95%   BMI 25.95 kg/m²      Temp (24hrs), Av.5 °F (37.5 °C), Min:98.3 °F (36.8 °C), Max:100.7 °F (38.2 °C)    Oxygen Therapy  O2 Sat (%): 95 % (20 1845)  Pulse via Oximetry: 60 beats per minute (20 1222)  O2 Device: Nasal cannula (20 1630)  O2 Flow Rate (L/min): 2 l/min (20 1630)  ETCO2 (mmHg): 1 mmHg (20 1120)    Intake/Output Summary (Last 24 hours) at 2020 1902  Last data filed at 2020 1806  Gross per 24 hour   Intake 990 ml   Output    Net 990 ml      General: No acute distress    Lungs:  CTA Bilaterally.    Heart:  Regular rate and rhythm,  No murmur, rub, or gallop  Abdomen: Soft, Non distended, Non tender, Positive bowel sounds  Extremities: No cyanosis, clubbing or edema  Neurologic:  No focal deficits    ASSESSMENT      Active Hospital Problems    Diagnosis Date Noted    Hematemesis 09/02/2020    Acute blood loss anemia 09/02/2020    Pancreatic cancer ([facility] [address] 09/02/2020    Hypotension 09/02/2020    Duodenal mass 09/02/2020    [facility] 08/31/2020    Duodenal ulcer 08/12/2020    Essential hypertension 05/08/2018     A/P  - Acute blood loss anemia - secondary to ulcerated duodenal mass. S/p IR emoblization. Serial H&H. Transfuse 1 unit prbc 9/4    - Pancreatic CA - oncology consulted. CT chest w/o evidence of mets but with progression of liver mets. S/p right chest port placement 9/4. If rebleeds needs to be evaluated for palliative XRT. Molecular studies pending for chemo plan. - HTN -borderline hypotension. Holding lisinopril    - hypokalemia - repleted, repeat in AM    DVT Prophylaxis: scds    Dispo - ?home in 1-2 days should HGB remain stable.      Signed By: Lisa Hull DO     September 4, 2020

## 2020-09-04 NOTE — PROCEDURES
Department of Interventional Radiology  [phone]        Interventional Radiology Brief Procedure Note    Patient: Gloria Recinos MRN: [id number]  SSN: [id number]    YOB: 1955  Age: 60 y.o. Sex: male      Date of Procedure: 9/4/2020    Pre-Procedure Diagnosis: pancreatic cancer    Post-Procedure Diagnosis: SAME    Procedure(s): Venous Chest Port Placement    Brief Description of Procedure: as above    Performed By: Imelda Putnam MD     Assistants: None    Anesthesia:Moderate Sedation    Estimated Blood Loss: Less than 10ml    Specimens:  None    Implants:  Chest Port Placement    Findings: Patent right IJ    Complications: None    Recommendations: ok to use.   Left accessed     Follow Up: as needed    Signed By: Imelda Putnam MD     September 4, 2020

## 2020-09-04 NOTE — PROGRESS NOTES
TRANSFER - OUT REPORT:    Verbal report given to Mitchell Fraser RN(name) on [employer]  being transferred to [facility](unit) for routine progression of care       Report consisted of patients Situation, Background, Assessment and   Recommendations(SBAR). Information from the following report(s) SBAR, Kardex, OR Summary, Procedure Summary, Intake/Output and MAR was reviewed with the receiving nurse. Lines:   Venous Access Device 8fr right upper chest 09/04/20 (Active)       Peripheral IV Left Forearm (Active)   Site Assessment Clean, dry, & intact 09/04/20 0755   Phlebitis Assessment 0 09/04/20 0755   Infiltration Assessment 0 09/04/20 0755   Dressing Status Clean, dry, & intact 09/04/20 0755   Dressing Type Transparent;Tape 09/04/20 0755   Hub Color/Line Status Patent 09/04/20 0755       Peripheral IV Right Antecubital (Active)   Site Assessment Clean, dry, & intact 09/04/20 0755   Phlebitis Assessment 0 09/04/20 0755   Infiltration Assessment 0 09/04/20 0755   Dressing Status Clean, dry, & intact 09/04/20 0755   Dressing Type Transparent;Tape 09/04/20 0755   Hub Color/Line Status Patent; Infusing 09/04/20 0755       Peripheral IV Right Arm (Active)   Site Assessment Clean, dry, & intact 09/04/20 0755   Phlebitis Assessment 0 09/04/20 0755   Infiltration Assessment 0 09/04/20 0755   Dressing Status Clean, dry, & intact 09/04/20 0755   Dressing Type Transparent;Tape 09/04/20 0755   Hub Color/Line Status Patent; Infusing 09/04/20 0755        Opportunity for questions and clarification was provided.       Patient transported with:   O2 @ 3 liters  Tech

## 2020-09-04 NOTE — PROGRESS NOTES
CM met with patient to complete assessment. Patient presented alert and oriented. Demographic information confirmed. The patient lives with his spouse in a two story home with no steps at the entrance. Patient stated he is independent with completing all ADL's. Patient obtains his medications from [facility] on "[address]. Patient voiced no difficulty with obtaining medications in the community. Discharge planning: PT/OT has not been consulted. Patient confirmed he has never had STR/HH. Patient voiced no needs at this time and anticipates to return home when medically stable. Please consult or notify CM if any needs shall arise. CM continues to monitor. Care Management Interventions  PCP Verified by CM: Yes  Mode of Transport at Discharge: Other (see comment)(TBD: based upon need. )  Transition of Care Consult (CM Consult): Discharge Planning  Discharge Durable Medical Equipment: No(Patient confirmed no DME. )  Physical Therapy Consult: No  Occupational Therapy Consult: No  Speech Therapy Consult: No  Current Support Network: Lives with Spouse, Own Home(Patient lives with his spouse Norma Miller [phone].)  Confirm Follow Up Transport: Self  The Plan for Transition of Care is Related to the Following Treatment Goals : Patient to obtain care to become medically stable and to return home with a safe transition. The Patient and/or Patient Representative was Provided with a Choice of Provider and Agrees with the Discharge Plan?: Yes  Name of the Patient Representative Who was Provided with a Choice of Provider and Agrees with the Discharge Plan: Patient.    [place] Resource Information Provided?: No  Discharge Location  Discharge Placement: Home

## 2020-09-04 NOTE — PROGRESS NOTES
Department of Interventional Radiology  [phone]                                 Progress Note  Patient: Lyndal Nyhan MRN: [id number]  SSN: [id number]    YOB: 1955  Age: 60 y.o. Sex: male      Subjective:     Feels OK. No complaints. No groin or leg pain. One episode of melena yesterday; no BM today. Receiving PRBC for drifting Hg/Hct. Objective:     Vitals:    09/04/20 0635 09/04/20 0650 09/04/20 0655 09/04/20 0755   BP: 110/58 123/61 113/65 106/59   Pulse: 87 79 74 65   Resp: 18 18 16 16   Temp: 99.5 °F (37.5 °C) 99.6 °F (37.6 °C) 99.5 °F (37.5 °C) 99 °F (37.2 °C)   SpO2: 92% 95% 95% 99%   Weight:       Height:          Physical Exam:   EXTREMITIES: RIght groin dressing is CDI, normal CFA pulse, no edema. Lab/Data Review: All lab results for the last 24 hours reviewed. Assessment:     Pancreatic cancer with duodenal erosion and ulceration causing hemorrhage treated with GDA and SPDA embolization yesterday. Plan:   One melena episode, yesterday. Hg/Hct w slight decrease. No sign of large hemorrhage in last 24 hours. Decreasing episodes of melena and slow Hg/Hct drift are to be expected with tumor in this location, in spite of successful tumor embolization. He may need additional transfusion over time. Will place chest port today in preparation of chemotherapy.       Signed By: Charan Turner MD     September 4, 2020

## 2020-09-05 LAB
ABO + RH BLD: NORMAL
ANION GAP SERPL CALC-SCNC: 7 MMOL/L (ref 7–16)
BASOPHILS # BLD: 0.1 K/UL (ref 0–0.2)
BASOPHILS NFR BLD: 1 % (ref 0–2)
BLD PROD TYP BPU: NORMAL
BLOOD GROUP ANTIBODIES SERPL: NORMAL
BPU ID: NORMAL
BUN SERPL-MCNC: 14 MG/DL (ref 8–23)
CALCIUM SERPL-MCNC: 7.7 MG/DL (ref 8.3–10.4)
CHLORIDE SERPL-SCNC: 107 MMOL/L (ref 98–107)
CO2 SERPL-SCNC: 25 MMOL/L (ref 21–32)
CREAT SERPL-MCNC: 0.99 MG/DL (ref 0.8–1.5)
CROSSMATCH RESULT,%XM: NORMAL
DIFFERENTIAL METHOD BLD: ABNORMAL
EOSINOPHIL # BLD: 1.2 K/UL (ref 0–0.8)
EOSINOPHIL NFR BLD: 9 % (ref 0.5–7.8)
ERYTHROCYTE [DISTWIDTH] IN BLOOD BY AUTOMATED COUNT: 15.9 % (ref 11.9–14.6)
GLUCOSE SERPL-MCNC: 129 MG/DL (ref 65–100)
HCT VFR BLD AUTO: 23.7 % (ref 41.1–50.3)
HCT VFR BLD AUTO: 24.5 % (ref 41.1–50.3)
HCT VFR BLD AUTO: 25.4 % (ref 41.1–50.3)
HGB BLD-MCNC: 7.6 G/DL (ref 13.6–17.2)
HGB BLD-MCNC: 7.8 G/DL (ref 13.6–17.2)
HGB BLD-MCNC: 8.1 G/DL (ref 13.6–17.2)
IMM GRANULOCYTES # BLD AUTO: 0.3 K/UL (ref 0–0.5)
IMM GRANULOCYTES NFR BLD AUTO: 2 % (ref 0–5)
LYMPHOCYTES # BLD: 1.1 K/UL (ref 0.5–4.6)
LYMPHOCYTES NFR BLD: 8 % (ref 13–44)
MCH RBC QN AUTO: 28.8 PG (ref 26.1–32.9)
MCHC RBC AUTO-ENTMCNC: 31.9 G/DL (ref 31.4–35)
MCV RBC AUTO: 90.4 FL (ref 79.6–97.8)
MONOCYTES # BLD: 1.8 K/UL (ref 0.1–1.3)
MONOCYTES NFR BLD: 14 % (ref 4–12)
NEUTS SEG # BLD: 8.9 K/UL (ref 1.7–8.2)
NEUTS SEG NFR BLD: 67 % (ref 43–78)
NRBC # BLD: 0.05 K/UL (ref 0–0.2)
PLATELET # BLD AUTO: 167 K/UL (ref 150–450)
PMV BLD AUTO: 10.6 FL (ref 9.4–12.3)
POTASSIUM SERPL-SCNC: 3.5 MMOL/L (ref 3.5–5.1)
RBC # BLD AUTO: 2.81 M/UL (ref 4.23–5.6)
SODIUM SERPL-SCNC: 139 MMOL/L (ref 136–145)
SPECIMEN EXP DATE BLD: NORMAL
STATUS OF UNIT,%ST: NORMAL
UNIT DIVISION, %UDIV: 0
WBC # BLD AUTO: 13.3 K/UL (ref 4.3–11.1)

## 2020-09-05 PROCEDURE — 36592 COLLECT BLOOD FROM PICC: CPT

## 2020-09-05 PROCEDURE — C9113 INJ PANTOPRAZOLE SODIUM, VIA: HCPCS | Performed by: INTERNAL MEDICINE

## 2020-09-05 PROCEDURE — 74011000250 HC RX REV CODE- 250: Performed by: INTERNAL MEDICINE

## 2020-09-05 PROCEDURE — 65270000029 HC RM PRIVATE

## 2020-09-05 PROCEDURE — 85018 HEMOGLOBIN: CPT

## 2020-09-05 PROCEDURE — 74011250637 HC RX REV CODE- 250/637: Performed by: INTERNAL MEDICINE

## 2020-09-05 PROCEDURE — 86923 COMPATIBILITY TEST ELECTRIC: CPT

## 2020-09-05 PROCEDURE — 36591 DRAW BLOOD OFF VENOUS DEVICE: CPT

## 2020-09-05 PROCEDURE — 85025 COMPLETE CBC W/AUTO DIFF WBC: CPT

## 2020-09-05 PROCEDURE — 80048 BASIC METABOLIC PNL TOTAL CA: CPT

## 2020-09-05 PROCEDURE — 86900 BLOOD TYPING SEROLOGIC ABO: CPT

## 2020-09-05 PROCEDURE — 74011250636 HC RX REV CODE- 250/636: Performed by: INTERNAL MEDICINE

## 2020-09-05 RX ORDER — GUAIFENESIN 100 MG/5ML
100 SOLUTION ORAL
Status: DISCONTINUED | OUTPATIENT
Start: 2020-09-05 | End: 2020-09-06 | Stop reason: HOSPADM

## 2020-09-05 RX ORDER — SODIUM CHLORIDE 9 MG/ML
250 INJECTION, SOLUTION INTRAVENOUS AS NEEDED
Status: DISCONTINUED | OUTPATIENT
Start: 2020-09-05 | End: 2020-09-06 | Stop reason: HOSPADM

## 2020-09-05 RX ORDER — PANTOPRAZOLE SODIUM 40 MG/1
40 TABLET, DELAYED RELEASE ORAL
Status: DISCONTINUED | OUTPATIENT
Start: 2020-09-05 | End: 2020-09-06 | Stop reason: HOSPADM

## 2020-09-05 RX ADMIN — SODIUM CHLORIDE 40 MG: 9 INJECTION, SOLUTION INTRAMUSCULAR; INTRAVENOUS; SUBCUTANEOUS at 08:54

## 2020-09-05 RX ADMIN — PANTOPRAZOLE SODIUM 40 MG: 40 TABLET, DELAYED RELEASE ORAL at 16:04

## 2020-09-05 RX ADMIN — GUAIFENESIN 100 MG: 200 SOLUTION ORAL at 22:57

## 2020-09-05 NOTE — PROGRESS NOTES
Sent the following message to Dr. Delilah Alba via perfect serve \"Hello, patient's latest Hemoglobin 7.8. Was told by night shift to transfuse PRBC if less than 8. I don't see any active orders to do this and GI signed off. Should I transfuse a unit? \"  Ordered no transfusion.

## 2020-09-05 NOTE — ROUTINE PROCESS
Bedside and Verbal shift change report given to Dedrick Lua RN (oncoming nurse) by  Maurilio Westbrook nurse). Report included the following information SBAR, Kardex, Intake/Output, MAR and Recent Results. no

## 2020-09-05 NOTE — ROUTINE PROCESS
Bedside and Verbal shift change report given to self, RN (oncoming nurse) by Jennifer Ramos RN (offgoing nurse). Report included the following information SBAR, Kardex, Intake/Output, MAR and Recent Results.

## 2020-09-05 NOTE — PROGRESS NOTES
Hospitalist Progress Note    2020  Admit Date: 2020  7:08 PM   NAME: Lady Munguia   :  1955   MRN:  [id number]   Attending: Portia Corona DO  PCP:  Samia Freire MD    SUBJECTIVE:   Patient 4L with pmhx of adenocarcinoma of head of pancreas with liver mets, s/p EUS on  that showed large ulcerated duodenal mass extending to duodenal bulb with stricture distally, brought to ER via EMS with report of dark tarry stoos and hematemesis. . Admitted to hospitalist service and started on IV PPI, octreotide gtt. GI, ONc and IR consulted. On 9/3 had mesenteric arteriogram w/ coil emoblization by IR. Received 2 units prbc on  to keep hgb >8 as per GI recs.  - resting in bed. No acute concerns. Asks for increase in diet. Reports one dark brown stool described as \"flecks\". hgb stable at 8.1 this AM.       Review of Systems negative with exception of pertinent positives noted above  PHYSICAL EXAM     Visit Vitals  /62 (BP 1 Location: Left arm, BP Patient Position: At rest)   Pulse 69   Temp 98.3 °F (36.8 °C)   Resp 18   Ht 5' 6\" (1.676 m)   Wt 72.3 kg (159 lb 8 oz)   SpO2 100%   BMI 25.74 kg/m²      Temp (24hrs), Av.3 °F (37.4 °C), Min:98.3 °F (36.8 °C), Max:100.7 °F (38.2 °C)    Oxygen Therapy  O2 Sat (%): 100 % (20 0830)  Pulse via Oximetry: 60 beats per minute (20 1222)  O2 Device: Nasal cannula (20 0745)  O2 Flow Rate (L/min): 2 l/min (20 0745)  ETCO2 (mmHg): 1 mmHg (20 1120)    Intake/Output Summary (Last 24 hours) at 2020 1018  Last data filed at 2020 0830  Gross per 24 hour   Intake 1347.5 ml   Output 0 ml   Net 1347.5 ml      General: No acute distress    Lungs:  CTA Bilaterally.    Heart:  Regular rate and rhythm,  No murmur, rub, or gallop  Abdomen: Soft, Non distended, Non tender, Positive bowel sounds  Extremities: No cyanosis, clubbing or edema  Neurologic:  No focal deficits    ASSESSMENT      Active Hospital Problems Diagnosis Date Noted    Hematemesis 09/02/2020    Acute blood loss anemia 09/02/2020    Pancreatic cancer ([facility] [address] 09/02/2020    Hypotension 09/02/2020    Duodenal mass 09/02/2020    Malignant neoplasm of head of pancreas ([facility] 08/31/2020    Duodenal ulcer 08/12/2020    Essential hypertension 05/08/2018     A/P  - Acute blood loss anemia - secondary to ulcerated duodenal mass. S/p IR emoblization. Serial H&H. Transfuse 1 unit prbc 9/4    - Pancreatic CA - oncology consulted. CT chest w/o evidence of mets but with progression of liver mets. S/p right chest port placement 9/4. If rebleeds needs to be evaluated for palliative XRT. Molecular studies pending for chemo plan. - HTN -borderline hypotension. Holding lisinopril    - hyperglycemia - check HA1C      DVT Prophylaxis: scds    Dispo - ?home in AM if tolerating diet and h&H stable.      Signed By: Pietr Lamb DO     September 5, 2020

## 2020-09-06 VITALS
TEMPERATURE: 98.3 F | RESPIRATION RATE: 18 BRPM | DIASTOLIC BLOOD PRESSURE: 66 MMHG | WEIGHT: 157.5 LBS | OXYGEN SATURATION: 95 % | HEART RATE: 69 BPM | BODY MASS INDEX: 25.31 KG/M2 | SYSTOLIC BLOOD PRESSURE: 131 MMHG | HEIGHT: 66 IN

## 2020-09-06 LAB
ANION GAP SERPL CALC-SCNC: 6 MMOL/L (ref 7–16)
BASOPHILS # BLD: 0.1 K/UL (ref 0–0.2)
BASOPHILS NFR BLD: 1 % (ref 0–2)
BUN SERPL-MCNC: 10 MG/DL (ref 8–23)
CALCIUM SERPL-MCNC: 8.4 MG/DL (ref 8.3–10.4)
CHLORIDE SERPL-SCNC: 108 MMOL/L (ref 98–107)
CO2 SERPL-SCNC: 26 MMOL/L (ref 21–32)
CREAT SERPL-MCNC: 0.93 MG/DL (ref 0.8–1.5)
DIFFERENTIAL METHOD BLD: ABNORMAL
EOSINOPHIL # BLD: 1.4 K/UL (ref 0–0.8)
EOSINOPHIL NFR BLD: 11 % (ref 0.5–7.8)
ERYTHROCYTE [DISTWIDTH] IN BLOOD BY AUTOMATED COUNT: 17.1 % (ref 11.9–14.6)
EST. AVERAGE GLUCOSE BLD GHB EST-MCNC: 114 MG/DL
GLUCOSE SERPL-MCNC: 111 MG/DL (ref 65–100)
HBA1C MFR BLD: 5.6 % (ref 4.8–6)
HCT VFR BLD AUTO: 27 % (ref 41.1–50.3)
HGB BLD-MCNC: 8.6 G/DL (ref 13.6–17.2)
IMM GRANULOCYTES # BLD AUTO: 0.3 K/UL (ref 0–0.5)
IMM GRANULOCYTES NFR BLD AUTO: 2 % (ref 0–5)
LYMPHOCYTES # BLD: 0.9 K/UL (ref 0.5–4.6)
LYMPHOCYTES NFR BLD: 7 % (ref 13–44)
MCH RBC QN AUTO: 28.5 PG (ref 26.1–32.9)
MCHC RBC AUTO-ENTMCNC: 31.9 G/DL (ref 31.4–35)
MCV RBC AUTO: 89.4 FL (ref 79.6–97.8)
MONOCYTES # BLD: 1.7 K/UL (ref 0.1–1.3)
MONOCYTES NFR BLD: 13 % (ref 4–12)
NEUTS SEG # BLD: 8.9 K/UL (ref 1.7–8.2)
NEUTS SEG NFR BLD: 68 % (ref 43–78)
NRBC # BLD: 0.04 K/UL (ref 0–0.2)
PLATELET # BLD AUTO: 172 K/UL (ref 150–450)
PMV BLD AUTO: 10.8 FL (ref 9.4–12.3)
POTASSIUM SERPL-SCNC: 3.3 MMOL/L (ref 3.5–5.1)
RBC # BLD AUTO: 3.02 M/UL (ref 4.23–5.6)
SODIUM SERPL-SCNC: 140 MMOL/L (ref 136–145)
WBC # BLD AUTO: 13.2 K/UL (ref 4.3–11.1)

## 2020-09-06 PROCEDURE — 74011250637 HC RX REV CODE- 250/637: Performed by: INTERNAL MEDICINE

## 2020-09-06 PROCEDURE — 80048 BASIC METABOLIC PNL TOTAL CA: CPT

## 2020-09-06 PROCEDURE — 85025 COMPLETE CBC W/AUTO DIFF WBC: CPT

## 2020-09-06 PROCEDURE — 83036 HEMOGLOBIN GLYCOSYLATED A1C: CPT

## 2020-09-06 PROCEDURE — 36591 DRAW BLOOD OFF VENOUS DEVICE: CPT

## 2020-09-06 PROCEDURE — 36430 TRANSFUSION BLD/BLD COMPNT: CPT

## 2020-09-06 PROCEDURE — P9016 RBC LEUKOCYTES REDUCED: HCPCS

## 2020-09-06 RX ORDER — POTASSIUM CHLORIDE 20 MEQ/1
40 TABLET, EXTENDED RELEASE ORAL
Status: COMPLETED | OUTPATIENT
Start: 2020-09-06 | End: 2020-09-06

## 2020-09-06 RX ADMIN — GUAIFENESIN 100 MG: 200 SOLUTION ORAL at 03:09

## 2020-09-06 RX ADMIN — POTASSIUM CHLORIDE 40 MEQ: 20 TABLET, EXTENDED RELEASE ORAL at 09:14

## 2020-09-06 RX ADMIN — PANTOPRAZOLE SODIUM 40 MG: 40 TABLET, DELAYED RELEASE ORAL at 08:08

## 2020-09-06 NOTE — PROGRESS NOTES
Pt is for discharge home today with no needs/supportive care orders recieved for CM at this time. Care Management Interventions  PCP Verified by CM: Yes  Mode of Transport at Discharge: Other (see comment)(TBD: based upon need. )  Transition of Care Consult (CM Consult): Discharge Planning  Discharge Durable Medical Equipment: No(Patient confirmed no DME. )  Physical Therapy Consult: No  Occupational Therapy Consult: No  Speech Therapy Consult: No  Current Support Network: Lives with Spouse, Own Home(Patient lives with his spouse Nicolas Barriga [phone].)  Confirm Follow Up Transport: Self  The Plan for Transition of Care is Related to the Following Treatment Goals : Patient to obtain care to become medically stable and to return home with a safe transition. The Patient and/or Patient Representative was Provided with a Choice of Provider and Agrees with the Discharge Plan?: Yes  Name of the Patient Representative Who was Provided with a Choice of Provider and Agrees with the Discharge Plan: Patient.    [place] Resource Information Provided?: No  Discharge Location  Discharge Placement: Home

## 2020-09-06 NOTE — ROUTINE PROCESS
Bedside and Verbal shift change report to be given to self (oncoming nurse) by Cesilia Amado RN (offgoing nurse). Report included the following information SBAR, Kardex and MAR.

## 2020-09-06 NOTE — DISCHARGE INSTRUCTIONS
Patient Education        Anemia: Care Instructions  Your Care Instructions     Anemia is a low level of red blood cells, which carry oxygen throughout your body. Many things can cause anemia. Lack of iron is one of the most common causes. Your body needs iron to make hemoglobin, a substance in red blood cells that carries oxygen from the lungs to your body's cells. Without enough iron, the body produces fewer and smaller red blood cells. As a result, your body's cells do not get enough oxygen, and you feel tired and weak. And you may have trouble concentrating. Bleeding is the most common cause of a lack of iron. You may have heavy menstrual bleeding or bleeding caused by conditions such as ulcers, hemorrhoids, or cancer. Regular use of aspirin or other anti-inflammatory medicines (such as ibuprofen) also can cause bleeding in some people. A lack of iron in your diet also can cause anemia, especially at times when the body needs more iron, such as during pregnancy, infancy, and the teen years. Your doctor may have prescribed iron pills. It may take several months of treatment for your iron levels to return to normal. Your doctor also may suggest that you eat foods that are rich in iron, such as meat and beans. There are many other causes of anemia. It is not always due to a lack of iron. Finding the specific cause of your anemia will help your doctor find the right treatment for you. Follow-up care is a key part of your treatment and safety. Be sure to make and go to all appointments, and call your doctor if you are having problems. It's also a good idea to know your test results and keep a list of the medicines you take. How can you care for yourself at home? · Take your medicines exactly as prescribed. Call your doctor if you think you are having a problem with your medicine.   · If your doctor recommends iron pills, take them as directed:  ? Try to take the pills on an empty stomach about 1 hour before or 2 hours after meals. But you may need to take iron with food to avoid an upset stomach. ? Do not take antacids or drink milk or caffeine drinks (such as coffee, tea, or cola) at the same time or within 2 hours of the time that you take your iron. They can make it hard for your body to absorb the iron. ? Vitamin C (from food or supplements) helps your body absorb iron. Try taking iron pills with a glass of orange juice or some other food that is high in vitamin C, such as citrus fruits. ? Iron pills may cause stomach problems, such as heartburn, nausea, diarrhea, constipation, and cramps. Be sure to drink plenty of fluids, and include fruits, vegetables, and fiber in your diet each day. Iron pills often make your bowel movements dark or green. ? If you forget to take an iron pill, do not take a double dose of iron the next time you take a pill. ? Keep iron pills out of the reach of small children. An overdose of iron can be very dangerous. · Follow your doctor's advice about eating iron-rich foods. These include red meat, shellfish, poultry, eggs, beans, raisins, whole-grain bread, and leafy green vegetables. · Steam vegetables to help them keep their iron content. When should you call for help? Call 911 anytime you think you may need emergency care. For example, call if:    · You have symptoms of a heart attack. These may include:  ? Chest pain or pressure, or a strange feeling in the chest.  ? Sweating. ? Shortness of breath. ? Nausea or vomiting. ? Pain, pressure, or a strange feeling in the back, neck, jaw, or upper belly or in one or both shoulders or arms. ? Lightheadedness or sudden weakness. ? A fast or irregular heartbeat. After you call 911, the  may tell you to chew 1 adult-strength or 2 to 4 low-dose aspirin. Wait for an ambulance. Do not try to drive yourself.     · You passed out (lost consciousness).    Call your doctor now or seek immediate medical care if:    · You have new or increased shortness of breath.     · You are dizzy or lightheaded, or you feel like you may faint.     · Your fatigue and weakness continue or get worse.     · You have any abnormal bleeding, such as:  ? Nosebleeds. ? Vaginal bleeding that is different (heavier, more frequent, at a different time of the month) than what you are used to.  ? Bloody or black stools, or rectal bleeding. ? Bloody or pink urine. Watch closely for changes in your health, and be sure to contact your doctor if:    · You do not get better as expected. Where can you learn more? Go to http://miriam-deandra.info/  Enter R301 in the search box to learn more about \"Anemia: Care Instructions. \"  Current as of: November 8, 2019               Content Version: 12.6  © 2159-2508 [employer]. Care instructions adapted under license by [employer] (which disclaims liability or warranty for this information). If you have questions about a medical condition or this instruction, always ask your healthcare professional. [place] [address] any warranty or liability for your use of this information. Patient Education        Pancreatic Cancer: Care Instructions  Your Care Instructions     Pancreatic cancer occurs when abnormal cells grow out of control in the pancreas. Your pancreas is in your belly, behind your stomach. It makes juices that help your body digest food. It also makes insulin, which helps control your blood sugar level. You may have a combination of treatments, depending on how advanced your cancer is. You may have surgery to take out part or all of your pancreas. Also, you may need radiation treatments or may take medicines that destroy cancer cells (chemotherapy). You may need to take medicines to help you digest food and control your blood sugar. If the cancer causes pain, your doctor will give you medicine or other treatment to help you be more comfortable.   When you find out that you have cancer, you may feel many emotions and may need some help coping. Seek out family, friends, and counselors for support. You also can do things at home to make yourself feel better while you go through treatment. Call the [facility] ([phone]) or visit its website at [email or web address] for more information. Follow-up care is a key part of your treatment and safety. Be sure to make and go to all appointments, and call your doctor if you are having problems. It's also a good idea to know your test results and keep a list of the medicines you take. How can you care for yourself at home? Take your medicines exactly as prescribed. Call your doctor if you think you are having a problem with your medicine. You may get medicine for nausea and vomiting if you have these side effects. Eat healthy food. If you do not feel like eating, try to eat food that has protein and extra calories to keep up your strength and prevent weight loss. Drink liquid meal replacements for extra calories and protein. Try to eat your main meal early. Get some physical activity every day, but do not get too tired. Keep doing the hobbies you enjoy as your energy allows. Take steps to control your stress and workload. Learn relaxation techniques. Share your feelings. Stress and tension affect our emotions. By expressing your feelings to others, you may be able to understand and cope with them. Consider joining a support group. Talking about a problem with your spouse, a good friend, or other people with similar problems is a good way to reduce tension and stress. Express yourself through art. Try writing, crafts, dance, or art to relieve stress. Some dance, writing, or art groups may be available just for people who have cancer. Be kind to your body and mind.  Getting enough sleep, eating a healthy diet, and taking time to do things you enjoy can contribute to an overall feeling of balance in your life and help reduce stress. Get help if you need it. Discuss your concerns with your doctor or counselor. If you are vomiting or have diarrhea:  Drink plenty of fluids (enough so that your urine is light yellow or clear like water) to prevent dehydration. Choose water and other caffeine-free clear liquids. If you have kidney, heart, or liver disease and have to limit fluids, talk with your doctor before you increase the amount of fluids you drink. When you are able to eat, try clear soups, mild foods, and liquids until all symptoms are gone for 12 to 48 hours. Other good choices include dry toast, crackers, cooked cereal, and gelatin dessert, such as Jell-O. If you have not already done so, prepare a list of advance directives. Advance directives are instructions to your doctor and family members about what kind of care you want if you become unable to speak or express yourself. When should you call for help? Call 911 anytime you think you may need emergency care. For example, call if:    You passed out (lost consciousness). Call your doctor now or seek immediate medical care if:    You have a fever.     You have abnormal bleeding.     You have new or worse pain.     You think you have an infection.     You have new symptoms, such as a cough, belly pain, vomiting, diarrhea, or a rash. Watch closely for changes in your health, and be sure to contact your doctor if:    You are much more tired than usual.     You have swollen glands in your armpits, groin, or neck.     You do not get better as expected. Where can you learn more? Go to http://miriam-deandra.info/  Enter A007 in the search box to learn more about \"Pancreatic Cancer: Care Instructions. \"  Current as of: April 29, 2020               Content Version: 12.6  © 1921-2906 [employer]. Care instructions adapted under license by [employer] (which disclaims liability or warranty for this information).  If you have questions about a medical condition or this instruction, always ask your healthcare professional. [address] any warranty or liability for your use of this information. DISCHARGE SUMMARY from Nurse    PATIENT INSTRUCTIONS:    After general anesthesia or intravenous sedation, for 24 hours or while taking prescription Narcotics:  · Limit your activities  · Do not drive and operate hazardous machinery  · Do not make important personal or business decisions  · Do  not drink alcoholic beverages  · If you have not urinated within 8 hours after discharge, please contact your surgeon on call. Report the following to your surgeon:  · Excessive pain, swelling, redness or odor of or around the surgical area  · Temperature over 100.5  · Nausea and vomiting lasting longer than 4 hours or if unable to take medications  · Any signs of decreased circulation or nerve impairment to extremity: change in color, persistent  numbness, tingling, coldness or increase pain  · Any questions    What to do at Home:  Recommended activity: Activity as tolerated. *  Please give a list of your current medications to your Primary Care Provider. *  Please update this list whenever your medications are discontinued, doses are      changed, or new medications (including over-the-counter products) are added. *  Please carry medication information at all times in case of emergency situations. These are general instructions for a healthy lifestyle:    No smoking/ No tobacco products/ Avoid exposure to second hand smoke  Surgeon General's Warning:  Quitting smoking now greatly reduces serious risk to your health.     Obesity, smoking, and sedentary lifestyle greatly increases your risk for illness    A healthy diet, regular physical exercise & weight monitoring are important for maintaining a healthy lifestyle    You may be retaining fluid if you have a history of heart failure or if you experience any of the following symptoms:  Weight gain of 3 pounds or more overnight or 5 pounds in a week, increased swelling in our hands or feet or shortness of breath while lying flat in bed. Please call your doctor as soon as you notice any of these symptoms; do not wait until your next office visit. The discharge information has been reviewed with the patient. The patient verbalized understanding. Discharge medications reviewed with the patient and appropriate educational materials and side effects teaching were provided. ___________________________________________________________________________________________________________________________________    Patient Education        Implanted [facility] PROVIDERS [facility] for Chemotherapy: Care Instructions  Your Care Instructions  An implanted port is a device placed, in most cases, under the skin of your chest below your collarbone. It is made of plastic, stainless steel, or titanium. The port is about the size of a quarter, but thicker. A thin, flexible tube called a catheter runs from the port under your skin into a large vein. A membrane (septum) similar to a pencil eraser is in the center of the port. A nurse uses a needle to put chemotherapy or other medicine and fluids through the septum into a blood vessel. The port may be used to draw blood for tests only if another vein, such as in the hand or arm, can't be used. An implanted port can be used for months. A special needle (called a Harding needle) may stay in the port for a short time. The port needs regular care to make sure it does not get blocked. Tell your doctor if you take aspirin or some other blood thinner. These medicines can increase the chance of bleeding inside your body. Follow-up care is a key part of your treatment and safety. Be sure to make and go to all appointments, and call your doctor if you are having problems. It's also a good idea to know your test results and keep a list of the medicines you take.   How can you care for yourself at home? · You will probably need to take 1 day off from work and will be able return to normal activities shortly after. This depends on the type of work you do, why you have the port, and how you feel. · You probably will be able to bathe and swim. But you may need to avoid some activities if a Harding needle is left in the port. Talk to your doctor about any limits on your activity. · Some clothes may rub the skin over the port. Do not wear a bra or suspenders that irritate your skin near the port. · You will get a medical alert card with information about your port. Carry this with you. It will tell health care workers you have a port in case you need emergency care. · Your port will need regular flushing to keep it open. A nurse or other health professional will do this for you. When should you call for help? Call your doctor now or seek immediate medical care if:    · You have signs of infection, such as:  ? Increased pain, swelling, warmth, or redness near the port. ? Red streaks leading from the port. ? Pus draining from the port. ? A fever.     · You have pain or swelling in your neck or arm.     · You have trouble breathing. Watch closely for changes in your health, and be sure to contact your doctor if:    · You have any problems with your port. Where can you learn more? Go to http://miriam-deandra.info/  Enter P577 in the search box to learn more about \"Implanted [facility] [employer] [facility] [place] for Chemotherapy: Care Instructions. \"  Current as of: June 26, 2019               Content Version: 12.6  © 9674-5398 [employer]. Care instructions adapted under license by [employer] (which disclaims liability or warranty for this information). If you have questions about a medical condition or this instruction, always ask your healthcare professional. [address] any warranty or liability for your use of this information.

## 2020-09-06 NOTE — PROGRESS NOTES
Patient's HGB is 7.6. Dr. Berger Prim notified. New orders placed by MD for 1 unit of PRBC to be infused. Will continue to monitor patient.

## 2020-09-06 NOTE — PROGRESS NOTES
Discharge instructions reviewed with Patient and wife Opportunity for questions provided. Patient and wife voiced understanding of all discharge instructions. Port de-accessed and dressing applied as well as all other lines removed. All questions have been answered and no other needs at this time. Patient to be wheeled out at this time.

## 2020-09-06 NOTE — ROUTINE PROCESS
Bedside shift change report given to GIOVANNI Bingham (oncoming nurse) by self, RN (offgoing nurse). Report included the following information SBAR, Kardex, Intake/Output, MAR and Recent Results.

## 2020-09-06 NOTE — PROGRESS NOTES
Hb noted to be 7.6gr. VS are stable, no signs of active bleeding. Oncology recommended to keep Hb > 8gr. Will order 1 PRBC tonight.    Continue serial HH trends

## 2020-09-06 NOTE — DISCHARGE SUMMARY
Hospitalist Discharge Summary     Patient ID:  Christophe Amado  [id number]  60 y.o.  1955  Admit date: 9/2/2020  7:08 PM  Discharge date and time: 9/6/2020  Attending: Drew Winslow DO  PCP:  Dat Godinez MD  Treatment Team: Attending Provider: Drew Winslow DO; Consulting Provider: Kiara Arnett MD; Consulting Provider: Enrique Casarez MD; Utilization Review: Em Mchugh RN; Care Manager: Christopher Yu    Principal Diagnosis Acute blood loss anemia   Principal Problem:    Acute blood loss anemia (9/2/2020)    Active Problems:    Essential hypertension (5/8/2018)      Duodenal ulcer (8/12/2020)      Malignant neoplasm of head of pancreas ([facility] [address] (8/31/2020)      Hematemesis (9/2/2020)      Pancreatic cancer ([facility] [address] (9/2/2020)      Hypotension (9/2/2020)      Duodenal mass (9/2/2020)             Hospital Course:  Please refer to the admission H&P for details of presentation. In summary, the patient is 65M with pmhx of adenocarcinoma of head of pancreas with liver mets, s/p EUS on 8/25 that showed large ulcerated duodenal mass extending to duodenal bulb with stricture distally, brought to ER via EMS with report of dark tarry stoos and hematemesis. Admitted to hospitalist service and started on IV PPI, octreotide gtt. GI, Onc and IR consulted. On 9/3 had mesenteric arteriogram w/ coil emoblization by IR. Received 2 units prbc on 9/4, 1 unit 9/5 to keep hgb >8 as per GI recs. Reports LLE cramping that has resolved with walking this morning (suspect related to hypokalemia). Has had brown stool on morning of discharge. Significant Diagnostic Studies:      Final [99]  9/04/2020  10:43  9/04/2020  11:12    Study Result     Title:  Chest port placement.   Ultrasound guidance for vascular access.       History: Pancreatic cancer  Insufficient venous access.     Consent: Informed written and oral consent was obtained from the patient after  explanation of benefits and risks (including, but not limited to: infection,  vascular injury, lung injury, and thrombus formation) of procedure. The  patient's questions were answered to their satisfaction. The patient stated  understanding and requested that we proceed.     Procedure:  Maximal sterile barrier technique (including:  cap, mask, sterile  gown, sterile gloves, sterile sheet, hand hygiene, chlorhexidene for antiseptic  skin preparation, sterile ultrasound techniques including sterile gel and  sterile ultrasound probe cover) was used.       A local field block with lidocaine was achieved.      Ultrasound evaluation of all potential access sites was performed due to lack of  a palpable vein. The vein was not palpable due to overlying adipose tissue. Using real-time ultrasound guidance, with appropriate image recording, the  patent right internal jugular vein was accessed. Using fluoroscopy, a peel-away  sheath was placed over a wire.     A 1-inch incision was made on the right chest wall and a subcutaneous pocket  created. The catheter was tunneled subcutaneously and passed down the peel-away  sheath positioning the tip in the right atrium, confirmed fluoroscopically. The  catheter was cut to the appropriate length and connected to the [facility] which was  then placed in the pocket.       All incisions were closed with absorbable suture. Dermabond was placed on the  skin. .     The Port-A-Cath was accessed, aspirated easily, and was filled with heparinized  saline.     Complications: None.     Radiation Exposure Indices:  Reference Air Kerma (Ka,r) = 4 mGy  Dose Area Product/Kerma Area Product (DAP/TRINITY/PKA) = 81 cGy-cm2  Fluoroscopy Exposure Time = 30 seconds     Medications:  Under physician supervision, 24 minutes of moderate intravenous  conscious sedation was performed by administering Versed, Fentanyl  intravenously.  Continuous pulse oximetry, heart rate, and blood pressure  monitoring was performed with an independent, trained observer present. .     Findings: The tip of the catheter is in the Right Atrium.      IMPRESSION  Impression: Uncomplicated right internal jugular vein tunneled single-lumen  Power Injectable Chest Port placement.      Plan: The patient will recover for 1 hour. The Chest Port is ready for use. Status  Exam Begun   Exam Ended     Final [99]  9/04/2020  04:42  9/04/2020  05:36    Study Result     History: Hypoxia, crackles     Exam: portable chest     Comparison: CT chest dated 9/3/2020     Findings: Persistent airspace opacity seen at the left lung base which could  represent scarring or atelectasis. Otherwise the lungs are clear. The  mediastinal contour and osseous structures are stable in appearance.     Impressions: Stable portable chest        Final [99]  9/03/2020  15:16  9/03/2020  15:27    Study Result     EXAMINATION: CT CHEST WITH IV CONTRAST 9/3/2020 3:27 PM     ACCESSION NUMBER: [id number]     INDICATION: metastatic pancreatic cancer     COMPARISON: CT abdomen and pelvis, 8/17/2020     TECHNIQUE: Multiple contiguous axial CT images of the chest were obtained from  the lung apices to the lung bases after the intravenous administration of 80  contrast material .      Radiation dose reduction techniques were used for this study:  Our CT scanners  use one or all of the following: Automated exposure control, adjustment of the  mA and/or kVp according to patient's size, iterative reconstruction.     FINDINGS:  LUNGS/PLEURA:The central airways are patent. There are a few scattered  peripheral and triangular or perifissural small nodules that likely represent  intraparenchymal lymph nodes. No other suspicious pulmonary nodules. No  consolidation, pleural effusion, or pneumothorax. Minimal scarring in the  lingula and lung bases.     MEDIASTINUM:The visualized thyroid and thoracic esophagus are unremarkable. No  mediastinal or hilar lymphadenopathy. The heart and great vessels are normal in  size.  No pericardial effusion. Mild coronary artery calcifications.     BONES AND SOFT TISSUES: Subcutaneous soft tissues are within normal limits. No  acute or aggressive osseous abnormality.     VISUALIZED UPPER ABDOMEN: There are numerous hypodense liver lesions that have  increased in both size and number since 8/17/2020. There are surgical clips in  the region of the common bile duct and ampulla. . Partially visualized contrast  in the left renal collecting system that is mildly dilated, likely secondary to  mass effect from a large parapelvic cyst.     OSSEOUS STRUCTURES: No suspicious lytic or blastic bony lesions.        IMPRESSION  IMPRESSION:     1. A few scattered pulmonary nodules that are most consistent with normal  intraparenchymal lymph nodes. No definite metastatic disease to the chest.  2. Interval progression of metastatic disease to the liver.               Labs: Results:       Chemistry Recent Labs     09/06/20 0528 09/05/20 0447 09/04/20 0429   * 129* 148*    139 137   K 3.3* 3.5 3.4*   * 107 105   CO2 26 25 25   BUN 10 14 14   CREA 0.93 0.99 1.03   CA 8.4 7.7* 7.4*   AGAP 6* 7 7   AP  --   --  64   TP  --   --  5.1*   ALB  --   --  2.2*   GLOB  --   --  2.9   AGRAT  --   --  0.8*      CBC w/Diff Recent Labs     09/06/20 0528 09/05/20 2055 09/05/20  1304 09/05/20 0447 09/04/20 0429   WBC 13.2*  --   --  13.3*  --  11.4*   RBC 3.02*  --   --  2.81*  --  2.39*   HGB 8.6* 7.6* 7.8* 8.1*   < > 6.8*   HCT 27.0* 23.7* 24.5* 25.4*   < > 20.6*     --   --  167  --  187   GRANS 68  --   --  67  --  71   LYMPH 7*  --   --  8*  --  7*   EOS 11*  --   --  9*  --  8*    < > = values in this interval not displayed. Cardiac Enzymes No results for input(s): CPK, CKND1, DAVID in the last 72 hours. No lab exists for component: CKRMB, TROIP   Coagulation No results for input(s): PTP, INR, APTT, INREXT in the last 72 hours.     Lipid Panel Lab Results   Component Value Date/Time Cholesterol, total 241 (H) 05/01/2018 08:18 AM    HDL Cholesterol 41 05/01/2018 08:18 AM    LDL, calculated 160 (H) 05/01/2018 08:18 AM    VLDL, calculated 40 05/01/2018 08:18 AM    Triglyceride 198 (H) 05/01/2018 08:18 AM      BNP No results for input(s): BNPP in the last 72 hours. Liver Enzymes Recent Labs     09/04/20  0429   TP 5.1*   ALB 2.2*   AP 64      Thyroid Studies Lab Results   Component Value Date/Time    TSH 3.310 05/01/2018 08:18 AM            Discharge Exam:  Visit Vitals  /55 (BP 1 Location: Left arm, BP Patient Position: At rest)   Pulse 63   Temp 98.4 °F (36.9 °C)   Resp 16   Ht 5' 6\" (1.676 m)   Wt 71.4 kg (157 lb 8 oz)   SpO2 95%   BMI 25.42 kg/m²     General appearance: alert, cooperative, no distress, appears stated age  Chest: right side chest port  Lungs: clear to auscultation bilaterally  Heart: regular rate and rhythm, S1, S2 normal, no murmur, click, rub or gallop  Abdomen: soft, non-tender. Bowel sounds normal. No masses,  no organomegaly  Extremities: no cyanosis or edema  Neurologic: Grossly normal    Disposition: home   Discharge Condition: stable  Patient Instructions:   Current Discharge Medication List      CONTINUE these medications which have NOT CHANGED    Details   traMADoL (ULTRAM-ER) 100 mg Tb24       pantoprazole (PROTONIX) 40 mg tablet Take 40 mg by mouth two (2) times a day. prochlorperazine (Compazine) 10 mg tablet Take 1 Tab by mouth every six (6) hours as needed for Nausea. Indications: prevent nausea and vomiting from cancer chemotherapy  Qty: 60 Tab, Refills: 2      ondansetron hcl (Zofran) 8 mg tablet Take 1 Tab by mouth every eight (8) hours as needed for Nausea.  Indications: prevent nausea and vomiting from cancer chemotherapy  Qty: 90 Tab, Refills: 2      lidocaine-prilocaine (EMLA) topical cream Apply to port about 45 minutes prior to access  Qty: 30 g, Refills: 5      vit B Cmplx 3-FA-Vit C-Biotin (NEPHRO VERÓNICA RX) 1- mg-mg-mcg tablet Take 1 Tab by mouth daily. cholecalciferol (VITAMIN D3) 1,000 unit tablet Take 1,000 Units by mouth daily.          STOP taking these medications       lisinopriL (PRINIVIL, ZESTRIL) 10 mg tablet Comments:   Reason for Stopping:               Activity: as tolerated  Diet: GI soft      Follow-up  ·   PCP, ONcology in 1 week with repeat CBC at follow-up  Time spent to discharge patient 35 minutes  Signed:  Valeri Saavedra DO  9/6/2020  8:36 AM

## 2020-09-07 ENCOUNTER — HOSPITAL ENCOUNTER (INPATIENT)
Age: 65
LOS: 9 days | Discharge: HOME HEALTH | DRG: 326 | End: 2020-09-16
Attending: EMERGENCY MEDICINE | Admitting: SURGERY
Payer: COMMERCIAL

## 2020-09-07 ENCOUNTER — APPOINTMENT (OUTPATIENT)
Dept: GENERAL RADIOLOGY | Age: 65
DRG: 326 | End: 2020-09-07
Attending: EMERGENCY MEDICINE
Payer: COMMERCIAL

## 2020-09-07 ENCOUNTER — APPOINTMENT (OUTPATIENT)
Dept: GENERAL RADIOLOGY | Age: 65
DRG: 326 | End: 2020-09-07
Attending: INTERNAL MEDICINE
Payer: COMMERCIAL

## 2020-09-07 ENCOUNTER — APPOINTMENT (OUTPATIENT)
Dept: CT IMAGING | Age: 65
DRG: 326 | End: 2020-09-07
Attending: EMERGENCY MEDICINE
Payer: COMMERCIAL

## 2020-09-07 ENCOUNTER — ANESTHESIA (OUTPATIENT)
Dept: SURGERY | Age: 65
DRG: 326 | End: 2020-09-07
Payer: COMMERCIAL

## 2020-09-07 ENCOUNTER — ANESTHESIA EVENT (OUTPATIENT)
Dept: SURGERY | Age: 65
DRG: 326 | End: 2020-09-07
Payer: COMMERCIAL

## 2020-09-07 DIAGNOSIS — C25.9 MALIGNANT NEOPLASM OF PANCREAS, UNSPECIFIED LOCATION OF MALIGNANCY (HCC): ICD-10-CM

## 2020-09-07 DIAGNOSIS — G47.01 INSOMNIA DUE TO MEDICAL CONDITION: ICD-10-CM

## 2020-09-07 DIAGNOSIS — K26.4 DUODENAL ULCER WITH HEMORRHAGE: ICD-10-CM

## 2020-09-07 DIAGNOSIS — D62 ACUTE BLOOD LOSS ANEMIA: ICD-10-CM

## 2020-09-07 DIAGNOSIS — Z51.5 ENCOUNTER FOR PALLIATIVE CARE: ICD-10-CM

## 2020-09-07 DIAGNOSIS — D64.9 ANEMIA, UNSPECIFIED TYPE: ICD-10-CM

## 2020-09-07 DIAGNOSIS — K31.89 DUODENAL MASS: ICD-10-CM

## 2020-09-07 DIAGNOSIS — R62.7 FAILURE TO THRIVE IN ADULT: ICD-10-CM

## 2020-09-07 DIAGNOSIS — R10.84 GENERALIZED ABDOMINAL PAIN: ICD-10-CM

## 2020-09-07 DIAGNOSIS — J95.821 RESPIRATORY FAILURE, POST-OPERATIVE (HCC): ICD-10-CM

## 2020-09-07 DIAGNOSIS — C25.0 MALIGNANT NEOPLASM OF HEAD OF PANCREAS (HCC): ICD-10-CM

## 2020-09-07 DIAGNOSIS — Z71.89 ACP (ADVANCE CARE PLANNING): ICD-10-CM

## 2020-09-07 DIAGNOSIS — R10.11 RIGHT UPPER QUADRANT ABDOMINAL PAIN: ICD-10-CM

## 2020-09-07 DIAGNOSIS — K92.2 GASTROINTESTINAL HEMORRHAGE, UNSPECIFIED GASTROINTESTINAL HEMORRHAGE TYPE: Primary | ICD-10-CM

## 2020-09-07 PROBLEM — K85.90 PANCREATITIS: Status: ACTIVE | Noted: 2020-09-07

## 2020-09-07 LAB
ABO + RH BLD: NORMAL
ALBUMIN SERPL-MCNC: 1.8 G/DL (ref 3.2–4.6)
ALBUMIN SERPL-MCNC: 1.9 G/DL (ref 3.2–4.6)
ALBUMIN/GLOB SERPL: 0.6 {RATIO} (ref 1.2–3.5)
ALBUMIN/GLOB SERPL: 0.9 {RATIO} (ref 1.2–3.5)
ALP SERPL-CCNC: 41 U/L (ref 50–136)
ALP SERPL-CCNC: 96 U/L (ref 50–136)
ALT SERPL-CCNC: 20 U/L (ref 12–65)
ALT SERPL-CCNC: 27 U/L (ref 12–65)
ANION GAP SERPL CALC-SCNC: 5 MMOL/L (ref 7–16)
ANION GAP SERPL CALC-SCNC: 5 MMOL/L (ref 7–16)
ANION GAP SERPL CALC-SCNC: 7 MMOL/L (ref 7–16)
APTT PPP: 32.9 SEC (ref 24.3–35.4)
APTT PPP: 33.7 SEC (ref 24.3–35.4)
ARTERIAL PATENCY WRIST A: ABNORMAL
AST SERPL-CCNC: 21 U/L (ref 15–37)
AST SERPL-CCNC: 29 U/L (ref 15–37)
BASE DEFICIT BLD-SCNC: 1 MMOL/L
BASE DEFICIT BLD-SCNC: 2 MMOL/L
BASE DEFICIT BLD-SCNC: 4 MMOL/L
BASE DEFICIT BLD-SCNC: 5 MMOL/L
BASOPHILS # BLD: 0.1 K/UL (ref 0–0.2)
BASOPHILS # BLD: 0.2 K/UL (ref 0–0.2)
BASOPHILS NFR BLD: 0 % (ref 0–2)
BASOPHILS NFR BLD: 1 % (ref 0–2)
BDY SITE: ABNORMAL
BILIRUB SERPL-MCNC: 0.5 MG/DL (ref 0.2–1.1)
BILIRUB SERPL-MCNC: 0.7 MG/DL (ref 0.2–1.1)
BLD PROD TYP BPU: NORMAL
BLOOD GROUP ANTIBODIES SERPL: NORMAL
BPU ID: NORMAL
BUN SERPL-MCNC: 16 MG/DL (ref 8–23)
BUN SERPL-MCNC: 18 MG/DL (ref 8–23)
BUN SERPL-MCNC: 19 MG/DL (ref 8–23)
CA-I BLD-MCNC: 1 MMOL/L (ref 1.12–1.32)
CA-I BLD-MCNC: 1.21 MMOL/L (ref 1.12–1.32)
CA-I BLD-MCNC: 1.29 MMOL/L (ref 1.12–1.32)
CALCIUM SERPL-MCNC: 7.4 MG/DL (ref 8.3–10.4)
CALCIUM SERPL-MCNC: 7.4 MG/DL (ref 8.3–10.4)
CALCIUM SERPL-MCNC: 7.7 MG/DL (ref 8.3–10.4)
CHLORIDE SERPL-SCNC: 110 MMOL/L (ref 98–107)
CHLORIDE SERPL-SCNC: 117 MMOL/L (ref 98–107)
CHLORIDE SERPL-SCNC: 119 MMOL/L (ref 98–107)
CO2 BLD-SCNC: 23 MMOL/L
CO2 SERPL-SCNC: 22 MMOL/L (ref 21–32)
CO2 SERPL-SCNC: 23 MMOL/L (ref 21–32)
CO2 SERPL-SCNC: 24 MMOL/L (ref 21–32)
COLLECT TIME,HTIME: 1812
CREAT SERPL-MCNC: 0.92 MG/DL (ref 0.8–1.5)
CREAT SERPL-MCNC: 1.06 MG/DL (ref 0.8–1.5)
CREAT SERPL-MCNC: 1.19 MG/DL (ref 0.8–1.5)
CROSSMATCH RESULT,%XM: NORMAL
DIFFERENTIAL METHOD BLD: ABNORMAL
DIFFERENTIAL METHOD BLD: ABNORMAL
EOSINOPHIL # BLD: 0.2 K/UL (ref 0–0.8)
EOSINOPHIL # BLD: 0.9 K/UL (ref 0–0.8)
EOSINOPHIL NFR BLD: 1 % (ref 0.5–7.8)
EOSINOPHIL NFR BLD: 5 % (ref 0.5–7.8)
ERYTHROCYTE [DISTWIDTH] IN BLOOD BY AUTOMATED COUNT: 15 % (ref 11.9–14.6)
ERYTHROCYTE [DISTWIDTH] IN BLOOD BY AUTOMATED COUNT: 15.1 % (ref 11.9–14.6)
ERYTHROCYTE [DISTWIDTH] IN BLOOD BY AUTOMATED COUNT: 18.1 % (ref 11.9–14.6)
EXHALED MINUTE VOLUME, VE: 10.6 L/MIN
FIBRINOGEN PPP-MCNC: 156 MG/DL (ref 190–501)
FIBRINOGEN PPP-MCNC: 267 MG/DL (ref 190–501)
GAS FLOW.O2 O2 DELIVERY SYS: ABNORMAL L/MIN
GAS FLOW.O2 SETTING OXYMISER: 16 BPM
GLOBULIN SER CALC-MCNC: 2.2 G/DL (ref 2.3–3.5)
GLOBULIN SER CALC-MCNC: 2.8 G/DL (ref 2.3–3.5)
GLUCOSE BLD STRIP.AUTO-MCNC: 184 MG/DL (ref 65–100)
GLUCOSE BLD STRIP.AUTO-MCNC: 203 MG/DL (ref 65–100)
GLUCOSE BLD STRIP.AUTO-MCNC: 224 MG/DL (ref 65–100)
GLUCOSE BLD STRIP.AUTO-MCNC: 248 MG/DL (ref 65–100)
GLUCOSE SERPL-MCNC: 168 MG/DL (ref 65–100)
GLUCOSE SERPL-MCNC: 170 MG/DL (ref 65–100)
GLUCOSE SERPL-MCNC: 197 MG/DL (ref 65–100)
HCO3 BLD-SCNC: 20.6 MMOL/L (ref 22–26)
HCO3 BLD-SCNC: 21.7 MMOL/L (ref 22–26)
HCO3 BLD-SCNC: 22.9 MMOL/L (ref 22–26)
HCO3 BLD-SCNC: 24.3 MMOL/L (ref 22–26)
HCT VFR BLD AUTO: 21.4 % (ref 41.1–50.3)
HCT VFR BLD AUTO: 21.6 % (ref 41.1–50.3)
HCT VFR BLD AUTO: 22.7 % (ref 41.1–50.3)
HCT VFR BLD AUTO: 23.3 % (ref 41.1–50.3)
HCT VFR BLD AUTO: 24.7 % (ref 41.1–50.3)
HCT VFR BLD AUTO: 28.1 % (ref 41.1–50.3)
HCT VFR BLD AUTO: 36.4 % (ref 41.1–50.3)
HGB BLD-MCNC: 12.5 G/DL (ref 13.6–17.2)
HGB BLD-MCNC: 6.7 G/DL (ref 13.6–17.2)
HGB BLD-MCNC: 7.1 G/DL (ref 13.6–17.2)
HGB BLD-MCNC: 7.4 G/DL (ref 13.6–17.2)
HGB BLD-MCNC: 7.5 G/DL (ref 13.6–17.2)
HGB BLD-MCNC: 8.2 G/DL (ref 13.6–17.2)
HGB BLD-MCNC: 9.6 G/DL (ref 13.6–17.2)
IMM GRANULOCYTES # BLD AUTO: 0.5 K/UL (ref 0–0.5)
IMM GRANULOCYTES # BLD AUTO: 0.7 K/UL (ref 0–0.5)
IMM GRANULOCYTES NFR BLD AUTO: 3 % (ref 0–5)
IMM GRANULOCYTES NFR BLD AUTO: 4 % (ref 0–5)
INR PPP: 1.4
INR PPP: 1.7
INSPIRATION.DURATION SETTING TIME VENT: 0.87 SEC
LIPASE SERPL-CCNC: 1204 U/L (ref 73–393)
LYMPHOCYTES # BLD: 0.8 K/UL (ref 0.5–4.6)
LYMPHOCYTES # BLD: 1 K/UL (ref 0.5–4.6)
LYMPHOCYTES NFR BLD: 5 % (ref 13–44)
LYMPHOCYTES NFR BLD: 6 % (ref 13–44)
MCH RBC QN AUTO: 28.6 PG (ref 26.1–32.9)
MCH RBC QN AUTO: 29.1 PG (ref 26.1–32.9)
MCH RBC QN AUTO: 29.1 PG (ref 26.1–32.9)
MCHC RBC AUTO-ENTMCNC: 31 G/DL (ref 31.4–35)
MCHC RBC AUTO-ENTMCNC: 34.2 G/DL (ref 31.4–35)
MCHC RBC AUTO-ENTMCNC: 34.3 G/DL (ref 31.4–35)
MCV RBC AUTO: 84.8 FL (ref 79.6–97.8)
MCV RBC AUTO: 85.2 FL (ref 79.6–97.8)
MCV RBC AUTO: 92.3 FL (ref 79.6–97.8)
MONOCYTES # BLD: 1.9 K/UL (ref 0.1–1.3)
MONOCYTES # BLD: 2 K/UL (ref 0.1–1.3)
MONOCYTES NFR BLD: 12 % (ref 4–12)
MONOCYTES NFR BLD: 12 % (ref 4–12)
NEUTS SEG # BLD: 12.2 K/UL (ref 1.7–8.2)
NEUTS SEG # BLD: 13.1 K/UL (ref 1.7–8.2)
NEUTS SEG NFR BLD: 74 % (ref 43–78)
NEUTS SEG NFR BLD: 77 % (ref 43–78)
NRBC # BLD: 0.03 K/UL (ref 0–0.2)
NRBC # BLD: 0.04 K/UL (ref 0–0.2)
NRBC # BLD: 0.08 K/UL (ref 0–0.2)
O2/TOTAL GAS SETTING VFR VENT: 60 %
PCO2 BLD: 35.1 MMHG (ref 35–45)
PCO2 BLD: 41.2 MMHG (ref 35–45)
PCO2 BLD: 41.5 MMHG (ref 35–45)
PCO2 BLD: 50 MMHG (ref 35–45)
PEEP RESPIRATORY: 8 CMH2O
PH BLD: 7.3 [PH] (ref 7.35–7.45)
PH BLD: 7.31 [PH] (ref 7.35–7.45)
PH BLD: 7.33 [PH] (ref 7.35–7.45)
PH BLD: 7.42 [PH] (ref 7.35–7.45)
PLATELET # BLD AUTO: 107 K/UL (ref 150–450)
PLATELET # BLD AUTO: 185 K/UL (ref 150–450)
PLATELET # BLD AUTO: 92 K/UL (ref 150–450)
PLATELET COMMENTS,PCOM: SLIGHT
PMV BLD AUTO: 10.7 FL (ref 9.4–12.3)
PMV BLD AUTO: 10.8 FL (ref 9.4–12.3)
PMV BLD AUTO: 10.8 FL (ref 9.4–12.3)
PO2 BLD: 202 MMHG (ref 75–100)
PO2 BLD: 338 MMHG (ref 75–100)
PO2 BLD: 345 MMHG (ref 75–100)
PO2 BLD: 375 MMHG (ref 75–100)
POTASSIUM BLD-SCNC: 4.2 MMOL/L (ref 3.5–5.1)
POTASSIUM BLD-SCNC: 4.3 MMOL/L (ref 3.5–5.1)
POTASSIUM BLD-SCNC: 4.4 MMOL/L (ref 3.5–5.1)
POTASSIUM SERPL-SCNC: 3.9 MMOL/L (ref 3.5–5.1)
POTASSIUM SERPL-SCNC: 4.4 MMOL/L (ref 3.5–5.1)
POTASSIUM SERPL-SCNC: 4.4 MMOL/L (ref 3.5–5.1)
PROT SERPL-MCNC: 4.1 G/DL (ref 6.3–8.2)
PROT SERPL-MCNC: 4.6 G/DL (ref 6.3–8.2)
PROTHROMBIN TIME: 17.7 SEC (ref 12–14.7)
PROTHROMBIN TIME: 20.3 SEC (ref 12–14.7)
RBC # BLD AUTO: 2.34 M/UL (ref 4.23–5.6)
RBC # BLD AUTO: 3.3 M/UL (ref 4.23–5.6)
RBC # BLD AUTO: 4.29 M/UL (ref 4.23–5.6)
RBC MORPH BLD: ABNORMAL
SAO2 % BLD: 100 % (ref 95–98)
SERVICE CMNT-IMP: ABNORMAL
SODIUM BLD-SCNC: 142 MMOL/L (ref 136–145)
SODIUM SERPL-SCNC: 140 MMOL/L (ref 136–145)
SODIUM SERPL-SCNC: 146 MMOL/L (ref 136–145)
SODIUM SERPL-SCNC: 146 MMOL/L (ref 136–145)
SPECIMEN EXP DATE BLD: NORMAL
SPECIMEN TYPE: ABNORMAL
STATUS OF UNIT,%ST: NORMAL
UNIT DIVISION, %UDIV: 0
VENTILATION MODE VENT: ABNORMAL
VT SETTING VENT: 500 ML
WBC # BLD AUTO: 11.4 K/UL (ref 4.3–11.1)
WBC # BLD AUTO: 16.6 K/UL (ref 4.3–11.1)
WBC # BLD AUTO: 17 K/UL (ref 4.3–11.1)
WBC MORPH BLD: ABNORMAL

## 2020-09-07 PROCEDURE — 96374 THER/PROPH/DIAG INJ IV PUSH: CPT

## 2020-09-07 PROCEDURE — 76060000038 HC ANESTHESIA 3.5 TO 4 HR: Performed by: SURGERY

## 2020-09-07 PROCEDURE — 74011250636 HC RX REV CODE- 250/636: Performed by: INTERNAL MEDICINE

## 2020-09-07 PROCEDURE — 77030005401 HC CATH RAD ARRO -A: Performed by: ANESTHESIOLOGY

## 2020-09-07 PROCEDURE — 77030013794 HC KT TRNSDUC BLD EDWD -B: Performed by: ANESTHESIOLOGY

## 2020-09-07 PROCEDURE — 77030040830 HC CATH URETH FOL MDII -A: Performed by: SURGERY

## 2020-09-07 PROCEDURE — 30233N1 TRANSFUSION OF NONAUTOLOGOUS RED BLOOD CELLS INTO PERIPHERAL VEIN, PERCUTANEOUS APPROACH: ICD-10-PCS | Performed by: INTERNAL MEDICINE

## 2020-09-07 PROCEDURE — 77030008542 HC TBNG MON PRSS EDWD -A: Performed by: ANESTHESIOLOGY

## 2020-09-07 PROCEDURE — 74177 CT ABD & PELVIS W/CONTRAST: CPT

## 2020-09-07 PROCEDURE — 74011000258 HC RX REV CODE- 258: Performed by: INTERNAL MEDICINE

## 2020-09-07 PROCEDURE — P9012 CRYOPRECIPITATE EACH UNIT: HCPCS

## 2020-09-07 PROCEDURE — 82947 ASSAY GLUCOSE BLOOD QUANT: CPT

## 2020-09-07 PROCEDURE — P9059 PLASMA, FRZ BETWEEN 8-24HOUR: HCPCS

## 2020-09-07 PROCEDURE — 77030008462 HC STPLR SKN PROX J&J -A: Performed by: SURGERY

## 2020-09-07 PROCEDURE — 85018 HEMOGLOBIN: CPT

## 2020-09-07 PROCEDURE — 74011000258 HC RX REV CODE- 258: Performed by: EMERGENCY MEDICINE

## 2020-09-07 PROCEDURE — 85025 COMPLETE CBC W/AUTO DIFF WBC: CPT

## 2020-09-07 PROCEDURE — 65610000001 HC ROOM ICU GENERAL

## 2020-09-07 PROCEDURE — 0DB90ZZ EXCISION OF DUODENUM, OPEN APPROACH: ICD-10-PCS | Performed by: SURGERY

## 2020-09-07 PROCEDURE — 99284 EMERGENCY DEPT VISIT MOD MDM: CPT

## 2020-09-07 PROCEDURE — 74011000250 HC RX REV CODE- 250: Performed by: NURSE ANESTHETIST, CERTIFIED REGISTERED

## 2020-09-07 PROCEDURE — 76450000000

## 2020-09-07 PROCEDURE — 82803 BLOOD GASES ANY COMBINATION: CPT

## 2020-09-07 PROCEDURE — 86900 BLOOD TYPING SEROLOGIC ABO: CPT

## 2020-09-07 PROCEDURE — C9113 INJ PANTOPRAZOLE SODIUM, VIA: HCPCS | Performed by: EMERGENCY MEDICINE

## 2020-09-07 PROCEDURE — 0D160ZA BYPASS STOMACH TO JEJUNUM, OPEN APPROACH: ICD-10-PCS | Performed by: SURGERY

## 2020-09-07 PROCEDURE — 99223 1ST HOSP IP/OBS HIGH 75: CPT | Performed by: INTERNAL MEDICINE

## 2020-09-07 PROCEDURE — 77030040922 HC BLNKT HYPOTHRM STRY -A: Performed by: ANESTHESIOLOGY

## 2020-09-07 PROCEDURE — 74011000250 HC RX REV CODE- 250: Performed by: INTERNAL MEDICINE

## 2020-09-07 PROCEDURE — 77030014008 HC SPNG HEMSTAT J&J -C: Performed by: SURGERY

## 2020-09-07 PROCEDURE — 74011250636 HC RX REV CODE- 250/636: Performed by: EMERGENCY MEDICINE

## 2020-09-07 PROCEDURE — 77030011264 HC ELECTRD BLD EXT COVD -A: Performed by: SURGERY

## 2020-09-07 PROCEDURE — 36430 TRANSFUSION BLD/BLD COMPNT: CPT

## 2020-09-07 PROCEDURE — 86923 COMPATIBILITY TEST ELECTRIC: CPT

## 2020-09-07 PROCEDURE — 74011250636 HC RX REV CODE- 250/636: Performed by: SURGERY

## 2020-09-07 PROCEDURE — 88304 TISSUE EXAM BY PATHOLOGIST: CPT

## 2020-09-07 PROCEDURE — C9113 INJ PANTOPRAZOLE SODIUM, VIA: HCPCS | Performed by: INTERNAL MEDICINE

## 2020-09-07 PROCEDURE — P9016 RBC LEUKOCYTES REDUCED: HCPCS

## 2020-09-07 PROCEDURE — P9035 PLATELET PHERES LEUKOREDUCED: HCPCS

## 2020-09-07 PROCEDURE — 85610 PROTHROMBIN TIME: CPT

## 2020-09-07 PROCEDURE — 0FT40ZZ RESECTION OF GALLBLADDER, OPEN APPROACH: ICD-10-PCS | Performed by: SURGERY

## 2020-09-07 PROCEDURE — 71045 X-RAY EXAM CHEST 1 VIEW: CPT

## 2020-09-07 PROCEDURE — 77030010512 HC APPL CLP LIG J&J -C: Performed by: SURGERY

## 2020-09-07 PROCEDURE — 74011250636 HC RX REV CODE- 250/636: Performed by: ANESTHESIOLOGY

## 2020-09-07 PROCEDURE — 77030036731 HC STPLR ENDOSC J&J -F: Performed by: SURGERY

## 2020-09-07 PROCEDURE — 30233K1 TRANSFUSION OF NONAUTOLOGOUS FROZEN PLASMA INTO PERIPHERAL VEIN, PERCUTANEOUS APPROACH: ICD-10-PCS | Performed by: NURSE PRACTITIONER

## 2020-09-07 PROCEDURE — 74011000258 HC RX REV CODE- 258: Performed by: SURGERY

## 2020-09-07 PROCEDURE — 77030011808 HC STPLR ENDOSCOPIC J&J -D: Performed by: SURGERY

## 2020-09-07 PROCEDURE — 85384 FIBRINOGEN ACTIVITY: CPT

## 2020-09-07 PROCEDURE — 77030021678 HC GLIDESCP STAT DISP VERT -B: Performed by: ANESTHESIOLOGY

## 2020-09-07 PROCEDURE — 76010000173 HC OR TIME 3 TO 3.5 HR INTENSV-TIER 1: Performed by: SURGERY

## 2020-09-07 PROCEDURE — 80053 COMPREHEN METABOLIC PANEL: CPT

## 2020-09-07 PROCEDURE — 77030019908 HC STETH ESOPH SIMS -A: Performed by: ANESTHESIOLOGY

## 2020-09-07 PROCEDURE — 99222 1ST HOSP IP/OBS MODERATE 55: CPT | Performed by: NURSE PRACTITIONER

## 2020-09-07 PROCEDURE — 85730 THROMBOPLASTIN TIME PARTIAL: CPT

## 2020-09-07 PROCEDURE — 85027 COMPLETE CBC AUTOMATED: CPT

## 2020-09-07 PROCEDURE — 83690 ASSAY OF LIPASE: CPT

## 2020-09-07 PROCEDURE — 77030002966 HC SUT PDS J&J -A: Performed by: SURGERY

## 2020-09-07 PROCEDURE — 74011000250 HC RX REV CODE- 250: Performed by: EMERGENCY MEDICINE

## 2020-09-07 PROCEDURE — 82962 GLUCOSE BLOOD TEST: CPT

## 2020-09-07 PROCEDURE — 96375 TX/PRO/DX INJ NEW DRUG ADDON: CPT

## 2020-09-07 PROCEDURE — C1751 CATH, INF, PER/CENT/MIDLINE: HCPCS | Performed by: ANESTHESIOLOGY

## 2020-09-07 PROCEDURE — 94002 VENT MGMT INPAT INIT DAY: CPT

## 2020-09-07 PROCEDURE — 77030040361 HC SLV COMPR DVT MDII -B: Performed by: SURGERY

## 2020-09-07 PROCEDURE — 77030037088 HC TUBE ENDOTRACH ORAL NSL COVD-A: Performed by: ANESTHESIOLOGY

## 2020-09-07 PROCEDURE — 88307 TISSUE EXAM BY PATHOLOGIST: CPT

## 2020-09-07 PROCEDURE — 77030002996 HC SUT SLK J&J -A: Performed by: SURGERY

## 2020-09-07 PROCEDURE — 74011000250 HC RX REV CODE- 250: Performed by: ANESTHESIOLOGY

## 2020-09-07 PROCEDURE — 0DB60ZZ EXCISION OF STOMACH, OPEN APPROACH: ICD-10-PCS | Performed by: SURGERY

## 2020-09-07 PROCEDURE — 77030009979 HC RELD STPLR TCR J&J -C: Performed by: SURGERY

## 2020-09-07 PROCEDURE — 74011000258 HC RX REV CODE- 258: Performed by: NURSE ANESTHETIST, CERTIFIED REGISTERED

## 2020-09-07 PROCEDURE — 77030020407 HC IV BLD WRMR ST 3M -A: Performed by: ANESTHESIOLOGY

## 2020-09-07 PROCEDURE — 30233R1 TRANSFUSION OF NONAUTOLOGOUS PLATELETS INTO PERIPHERAL VEIN, PERCUTANEOUS APPROACH: ICD-10-PCS | Performed by: ANESTHESIOLOGY

## 2020-09-07 PROCEDURE — 77030040506 HC DRN WND MDII -A: Performed by: SURGERY

## 2020-09-07 PROCEDURE — 74011000636 HC RX REV CODE- 636: Performed by: EMERGENCY MEDICINE

## 2020-09-07 PROCEDURE — 36415 COLL VENOUS BLD VENIPUNCTURE: CPT

## 2020-09-07 PROCEDURE — 77030013292 HC BOWL MX PRSM J&J -A: Performed by: ANESTHESIOLOGY

## 2020-09-07 RX ORDER — CALCIUM CHLORIDE INJECTION 100 MG/ML
INJECTION, SOLUTION INTRAVENOUS AS NEEDED
Status: DISCONTINUED | OUTPATIENT
Start: 2020-09-07 | End: 2020-09-07 | Stop reason: HOSPADM

## 2020-09-07 RX ORDER — SODIUM CHLORIDE 9 MG/ML
250 INJECTION, SOLUTION INTRAVENOUS AS NEEDED
Status: DISCONTINUED | OUTPATIENT
Start: 2020-09-07 | End: 2020-09-09 | Stop reason: SDUPTHER

## 2020-09-07 RX ORDER — SODIUM CHLORIDE 0.9 % (FLUSH) 0.9 %
10 SYRINGE (ML) INJECTION
Status: COMPLETED | OUTPATIENT
Start: 2020-09-07 | End: 2020-09-07

## 2020-09-07 RX ORDER — SODIUM CHLORIDE, SODIUM LACTATE, POTASSIUM CHLORIDE, CALCIUM CHLORIDE 600; 310; 30; 20 MG/100ML; MG/100ML; MG/100ML; MG/100ML
125 INJECTION, SOLUTION INTRAVENOUS CONTINUOUS
Status: DISPENSED | OUTPATIENT
Start: 2020-09-07 | End: 2020-09-10

## 2020-09-07 RX ORDER — NOREPINEPHRINE BITARTRATE/D5W 4MG/250ML
.5-16 PLASTIC BAG, INJECTION (ML) INTRAVENOUS CONTINUOUS
Status: CANCELLED | OUTPATIENT
Start: 2020-09-07

## 2020-09-07 RX ORDER — OCTREOTIDE ACETATE 100 UG/ML
100 INJECTION, SOLUTION INTRAVENOUS; SUBCUTANEOUS
Status: COMPLETED | OUTPATIENT
Start: 2020-09-07 | End: 2020-09-07

## 2020-09-07 RX ORDER — ONDANSETRON 2 MG/ML
4 INJECTION INTRAMUSCULAR; INTRAVENOUS
Status: DISCONTINUED | OUTPATIENT
Start: 2020-09-07 | End: 2020-09-16 | Stop reason: HOSPADM

## 2020-09-07 RX ORDER — LABETALOL HYDROCHLORIDE 5 MG/ML
INJECTION, SOLUTION INTRAVENOUS
Status: COMPLETED
Start: 2020-09-07 | End: 2020-09-07

## 2020-09-07 RX ORDER — ONDANSETRON 2 MG/ML
4 INJECTION INTRAMUSCULAR; INTRAVENOUS
Status: COMPLETED | OUTPATIENT
Start: 2020-09-07 | End: 2020-09-07

## 2020-09-07 RX ORDER — FENTANYL CITRATE-0.9 % NACL/PF 25 MCG/ML
25-200 PLASTIC BAG, INJECTION (ML) INJECTION
Status: DISCONTINUED | OUTPATIENT
Start: 2020-09-07 | End: 2020-09-09 | Stop reason: SDUPTHER

## 2020-09-07 RX ORDER — ETOMIDATE 2 MG/ML
INJECTION INTRAVENOUS AS NEEDED
Status: DISCONTINUED | OUTPATIENT
Start: 2020-09-07 | End: 2020-09-07 | Stop reason: HOSPADM

## 2020-09-07 RX ORDER — LABETALOL HYDROCHLORIDE 5 MG/ML
20 INJECTION, SOLUTION INTRAVENOUS ONCE
Status: ACTIVE | OUTPATIENT
Start: 2020-09-07 | End: 2020-09-08

## 2020-09-07 RX ORDER — LABETALOL HYDROCHLORIDE 5 MG/ML
INJECTION, SOLUTION INTRAVENOUS AS NEEDED
Status: DISCONTINUED | OUTPATIENT
Start: 2020-09-07 | End: 2020-09-07 | Stop reason: HOSPADM

## 2020-09-07 RX ORDER — FENTANYL CITRATE 50 UG/ML
INJECTION, SOLUTION INTRAMUSCULAR; INTRAVENOUS AS NEEDED
Status: DISCONTINUED | OUTPATIENT
Start: 2020-09-07 | End: 2020-09-07 | Stop reason: HOSPADM

## 2020-09-07 RX ORDER — SODIUM BICARBONATE 1 MEQ/ML
SYRINGE (ML) INTRAVENOUS AS NEEDED
Status: DISCONTINUED | OUTPATIENT
Start: 2020-09-07 | End: 2020-09-07 | Stop reason: HOSPADM

## 2020-09-07 RX ORDER — SODIUM CHLORIDE 9 MG/ML
INJECTION, SOLUTION INTRAVENOUS
Status: DISCONTINUED | OUTPATIENT
Start: 2020-09-07 | End: 2020-09-07 | Stop reason: HOSPADM

## 2020-09-07 RX ORDER — ALBUMIN HUMAN 50 G/1000ML
50 SOLUTION INTRAVENOUS ONCE
Status: COMPLETED | OUTPATIENT
Start: 2020-09-08 | End: 2020-09-08

## 2020-09-07 RX ORDER — MORPHINE SULFATE 4 MG/ML
6 INJECTION INTRAVENOUS
Status: COMPLETED | OUTPATIENT
Start: 2020-09-07 | End: 2020-09-07

## 2020-09-07 RX ORDER — NOREPINEPHRINE BITARTRATE/D5W 4MG/250ML
.5-16 PLASTIC BAG, INJECTION (ML) INTRAVENOUS
Status: DISCONTINUED | OUTPATIENT
Start: 2020-09-07 | End: 2020-09-09 | Stop reason: SDUPTHER

## 2020-09-07 RX ORDER — HYDRALAZINE HYDROCHLORIDE 20 MG/ML
20 INJECTION INTRAMUSCULAR; INTRAVENOUS ONCE
Status: ACTIVE | OUTPATIENT
Start: 2020-09-07 | End: 2020-09-08

## 2020-09-07 RX ORDER — MIDAZOLAM HYDROCHLORIDE 1 MG/ML
INJECTION, SOLUTION INTRAMUSCULAR; INTRAVENOUS AS NEEDED
Status: DISCONTINUED | OUTPATIENT
Start: 2020-09-07 | End: 2020-09-07 | Stop reason: HOSPADM

## 2020-09-07 RX ORDER — ACETAMINOPHEN 650 MG/1
650 SUPPOSITORY RECTAL
Status: DISCONTINUED | OUTPATIENT
Start: 2020-09-07 | End: 2020-09-16 | Stop reason: HOSPADM

## 2020-09-07 RX ORDER — HYDROMORPHONE HYDROCHLORIDE 1 MG/ML
1 INJECTION, SOLUTION INTRAMUSCULAR; INTRAVENOUS; SUBCUTANEOUS
Status: DISCONTINUED | OUTPATIENT
Start: 2020-09-07 | End: 2020-09-16 | Stop reason: HOSPADM

## 2020-09-07 RX ORDER — HYDROMORPHONE HYDROCHLORIDE 1 MG/ML
0.5 INJECTION, SOLUTION INTRAMUSCULAR; INTRAVENOUS; SUBCUTANEOUS
Status: DISCONTINUED | OUTPATIENT
Start: 2020-09-07 | End: 2020-09-16 | Stop reason: HOSPADM

## 2020-09-07 RX ORDER — DIPHENHYDRAMINE HCL 25 MG
25 CAPSULE ORAL
Status: DISCONTINUED | OUTPATIENT
Start: 2020-09-07 | End: 2020-09-16 | Stop reason: HOSPADM

## 2020-09-07 RX ORDER — SUCCINYLCHOLINE CHLORIDE 20 MG/ML
INJECTION INTRAMUSCULAR; INTRAVENOUS AS NEEDED
Status: DISCONTINUED | OUTPATIENT
Start: 2020-09-07 | End: 2020-09-07 | Stop reason: HOSPADM

## 2020-09-07 RX ORDER — ROCURONIUM BROMIDE 10 MG/ML
INJECTION, SOLUTION INTRAVENOUS AS NEEDED
Status: DISCONTINUED | OUTPATIENT
Start: 2020-09-07 | End: 2020-09-07 | Stop reason: HOSPADM

## 2020-09-07 RX ORDER — SODIUM CHLORIDE 9 MG/ML
75 INJECTION, SOLUTION INTRAVENOUS CONTINUOUS
Status: DISCONTINUED | OUTPATIENT
Start: 2020-09-07 | End: 2020-09-07

## 2020-09-07 RX ORDER — CEFAZOLIN SODIUM/WATER 2 G/20 ML
SYRINGE (ML) INTRAVENOUS AS NEEDED
Status: DISCONTINUED | OUTPATIENT
Start: 2020-09-07 | End: 2020-09-07 | Stop reason: HOSPADM

## 2020-09-07 RX ORDER — LIDOCAINE HYDROCHLORIDE 20 MG/ML
INJECTION, SOLUTION EPIDURAL; INFILTRATION; INTRACAUDAL; PERINEURAL AS NEEDED
Status: DISCONTINUED | OUTPATIENT
Start: 2020-09-07 | End: 2020-09-07 | Stop reason: HOSPADM

## 2020-09-07 RX ORDER — SODIUM CHLORIDE 9 MG/ML
250 INJECTION, SOLUTION INTRAVENOUS AS NEEDED
Status: DISCONTINUED | OUTPATIENT
Start: 2020-09-07 | End: 2020-09-16 | Stop reason: HOSPADM

## 2020-09-07 RX ADMIN — SODIUM CHLORIDE 40 MG: 9 INJECTION INTRAMUSCULAR; INTRAVENOUS; SUBCUTANEOUS at 08:55

## 2020-09-07 RX ADMIN — IOPAMIDOL 100 ML: 755 INJECTION, SOLUTION INTRAVENOUS at 01:01

## 2020-09-07 RX ADMIN — MIDAZOLAM 1 MG: 1 INJECTION INTRAMUSCULAR; INTRAVENOUS at 14:23

## 2020-09-07 RX ADMIN — SODIUM BICARBONATE 50 MEQ: 84 INJECTION, SOLUTION INTRAVENOUS at 14:39

## 2020-09-07 RX ADMIN — ONDANSETRON 4 MG: 2 INJECTION INTRAMUSCULAR; INTRAVENOUS at 13:35

## 2020-09-07 RX ADMIN — DEXTROSE MONOHYDRATE 16 MCG: 5 INJECTION, SOLUTION INTRAVENOUS at 15:52

## 2020-09-07 RX ADMIN — ROCURONIUM BROMIDE 50 MG: 10 INJECTION, SOLUTION INTRAVENOUS at 16:24

## 2020-09-07 RX ADMIN — FENTANYL CITRATE 25 MCG: 50 INJECTION INTRAMUSCULAR; INTRAVENOUS at 15:08

## 2020-09-07 RX ADMIN — OCTREOTIDE ACETATE 50 MCG/HR: 500 INJECTION, SOLUTION INTRAVENOUS; SUBCUTANEOUS at 06:27

## 2020-09-07 RX ADMIN — FENTANYL CITRATE 25 MCG: 50 INJECTION INTRAMUSCULAR; INTRAVENOUS at 14:43

## 2020-09-07 RX ADMIN — LIDOCAINE HYDROCHLORIDE 40 MG: 20 INJECTION, SOLUTION EPIDURAL; INFILTRATION; INTRACAUDAL; PERINEURAL at 14:09

## 2020-09-07 RX ADMIN — SODIUM CHLORIDE 40 MG: 9 INJECTION INTRAMUSCULAR; INTRAVENOUS; SUBCUTANEOUS at 20:21

## 2020-09-07 RX ADMIN — SODIUM CHLORIDE 75 ML/HR: 900 INJECTION, SOLUTION INTRAVENOUS at 05:35

## 2020-09-07 RX ADMIN — CALCIUM CHLORIDE 1000 MG: 100 INJECTION INTRAVENOUS; INTRAVENTRICULAR at 14:35

## 2020-09-07 RX ADMIN — MIDAZOLAM 1 MG: 1 INJECTION INTRAMUSCULAR; INTRAVENOUS at 17:27

## 2020-09-07 RX ADMIN — SODIUM CHLORIDE 50 MCG/HR: 900 INJECTION, SOLUTION INTRAVENOUS at 18:01

## 2020-09-07 RX ADMIN — ONDANSETRON 4 MG: 2 INJECTION INTRAMUSCULAR; INTRAVENOUS at 01:02

## 2020-09-07 RX ADMIN — LABETALOL HYDROCHLORIDE 10 MG: 5 INJECTION INTRAVENOUS at 17:35

## 2020-09-07 RX ADMIN — HYDROMORPHONE HYDROCHLORIDE 1 MG: 1 INJECTION, SOLUTION INTRAMUSCULAR; INTRAVENOUS; SUBCUTANEOUS at 22:23

## 2020-09-07 RX ADMIN — SODIUM CHLORIDE: 9 INJECTION, SOLUTION INTRAVENOUS at 14:16

## 2020-09-07 RX ADMIN — MORPHINE SULFATE 6 MG: 4 INJECTION INTRAVENOUS at 01:01

## 2020-09-07 RX ADMIN — Medication 10 ML: at 01:01

## 2020-09-07 RX ADMIN — NOREPINEPHRINE BITARTRATE 6 MCG/MIN: 1 INJECTION, SOLUTION, CONCENTRATE INTRAVENOUS at 12:56

## 2020-09-07 RX ADMIN — SODIUM CHLORIDE: 9 INJECTION, SOLUTION INTRAVENOUS at 13:44

## 2020-09-07 RX ADMIN — ETOMIDATE 16 MG: 2 INJECTION, SOLUTION INTRAVENOUS at 14:09

## 2020-09-07 RX ADMIN — ROCURONIUM BROMIDE 10 MG: 10 INJECTION, SOLUTION INTRAVENOUS at 14:09

## 2020-09-07 RX ADMIN — PIPERACILLIN AND TAZOBACTAM 4.5 G: 4; .5 INJECTION, POWDER, FOR SOLUTION INTRAVENOUS at 22:24

## 2020-09-07 RX ADMIN — FENTANYL CITRATE 50 MCG: 50 INJECTION INTRAMUSCULAR; INTRAVENOUS at 14:50

## 2020-09-07 RX ADMIN — SUCCINYLCHOLINE CHLORIDE 120 MG: 20 INJECTION, SOLUTION INTRAMUSCULAR; INTRAVENOUS at 14:09

## 2020-09-07 RX ADMIN — SODIUM CHLORIDE 100 ML: 900 INJECTION, SOLUTION INTRAVENOUS at 01:01

## 2020-09-07 RX ADMIN — SODIUM CHLORIDE 40 MG: 9 INJECTION, SOLUTION INTRAMUSCULAR; INTRAVENOUS; SUBCUTANEOUS at 01:01

## 2020-09-07 RX ADMIN — MIDAZOLAM 1 MG: 1 INJECTION INTRAMUSCULAR; INTRAVENOUS at 14:28

## 2020-09-07 RX ADMIN — DEXTROSE MONOHYDRATE 16 MCG: 5 INJECTION, SOLUTION INTRAVENOUS at 14:20

## 2020-09-07 RX ADMIN — OCTREOTIDE ACETATE 100 MCG: 100 INJECTION, SOLUTION INTRAVENOUS; SUBCUTANEOUS at 01:53

## 2020-09-07 RX ADMIN — OCTREOTIDE ACETATE 50 MCG/HR: 500 INJECTION, SOLUTION INTRAVENOUS; SUBCUTANEOUS at 21:43

## 2020-09-07 RX ADMIN — Medication 2 G: at 15:00

## 2020-09-07 RX ADMIN — ROCURONIUM BROMIDE 50 MG: 10 INJECTION, SOLUTION INTRAVENOUS at 14:14

## 2020-09-07 RX ADMIN — HYDROMORPHONE HYDROCHLORIDE 1 MG: 1 INJECTION, SOLUTION INTRAMUSCULAR; INTRAVENOUS; SUBCUTANEOUS at 20:20

## 2020-09-07 RX ADMIN — SODIUM CHLORIDE, SODIUM LACTATE, POTASSIUM CHLORIDE, AND CALCIUM CHLORIDE 75 ML/HR: 600; 310; 30; 20 INJECTION, SOLUTION INTRAVENOUS at 18:00

## 2020-09-07 RX ADMIN — ONDANSETRON 4 MG: 2 INJECTION INTRAMUSCULAR; INTRAVENOUS at 07:08

## 2020-09-07 RX ADMIN — MIDAZOLAM 1 MG: 1 INJECTION INTRAMUSCULAR; INTRAVENOUS at 17:14

## 2020-09-07 NOTE — ED NOTES
TRANSFER - OUT REPORT:    Verbal report given to RN on [employer]  being transferred to 725 for routine progression of care       Report consisted of patients Situation, Background, Assessment and   Recommendations(SBAR). Information from the following report(s) SBAR, ED Summary and MAR was reviewed with the receiving nurse. Lines:   Venous Access Device 8fr right upper chest 09/04/20 (Active)       Peripheral IV 09/07/20 Left Antecubital (Active)        Opportunity for questions and clarification was provided.       Patient transported with:   Monitor  Registered Nurse

## 2020-09-07 NOTE — PROGRESS NOTES
TRANSFER - IN REPORT:    Verbal report received from [facility] RN(name) on [employer]  being received from ED(unit) for routine progression of care      Report consisted of patients Situation, Background, Assessment and   Recommendations(SBAR). Information from the following report(s) SBAR was reviewed with the receiving nurse. Opportunity for questions and clarification was provided. Assessment completed upon patients arrival to unit and care assumed.

## 2020-09-07 NOTE — ED PROVIDER NOTES
Patient was discharged from the hospital earlier today. Note follows. Hospital Course:  Please refer to the admission H&P for details of presentation. In summary, the patient is 65M with pmhx of adenocarcinoma of head of pancreas with liver mets, s/p EUS on 8/25 that showed large ulcerated duodenal mass extending to duodenal bulb with stricture distally, brought to ER via EMS with report of dark tarry stoos and hematemesis. Admitted to hospitalist service and started on IV PPI, octreotide gtt. GI, Onc and IR consulted.   On 9/3 had mesenteric arteriogram w/ coil emoblization by IR.   Received 2 units prbc on 9/4, 1 unit 9/5 to keep hgb >8 as per GI recs. Reports LLE cramping that has resolved with walking this morning (suspect related to hypokalemia). Has had brown stool on morning of discharge. Dates earlier this evening had a bloody bowel movement dark red in color. Some upper abdominal pain afterwards. Nausea present. No vomiting. No dysuria hematuria. The history is provided by the patient. No  was used. Rectal Bleeding    This is a new problem. The current episode started 1 to 2 hours ago. The stool is described as bloody coated. Associated symptoms include abdominal pain and nausea. Pertinent negatives include no dysuria, no abdominal distention, no chills, no fever, no back pain, no vomiting and no diarrhea. Risk factors include a recent illness. He has tried nothing for the symptoms.         Past Medical History:   Diagnosis Date    Cancer [facility])     pancreatic    GERD (gastroesophageal reflux disease)     Hernia        Past Surgical History:   Procedure Laterality Date    HX CATARACT REMOVAL Left 12/15/2017    HX HERNIA REPAIR  2007    HX ORTHOPAEDIC Right 1982    tibia- with hardware    IR INSERT TUNL CVC W PORT OVER 5 YEARS  9/4/2020    IR OCCL Alvy Hem W SI  9/3/2020         Family History:   Problem Relation Age of Onset    No Known Problems Mother  Cancer Father 72         leukemia    Diabetes Sister     No Known Problems Brother     No Known Problems Sister     Cancer Sister        Social History     Socioeconomic History    Marital status:      Spouse name: Not on file    Number of children: Not on file    Years of education: Not on file    Highest education level: Not on file   Occupational History    Not on file   Social Needs    Financial resource strain: Not on file    Food insecurity     Worry: Not on file     Inability: Not on file   [employer] needs     Medical: Not on file     Non-medical: Not on file   Tobacco Use    Smoking status: Former Smoker     Packs/day: 1.00     Years: 10.00     Pack years: 10.00     Last attempt to quit: 2006     Years since quittin.6    Smokeless tobacco: Never Used   Substance and Sexual Activity    Alcohol use: Not Currently    Drug use: Never    Sexual activity: Not on file   Lifestyle    Physical activity     Days per week: Not on file     Minutes per session: Not on file    Stress: Not on file   Relationships    Social connections     Talks on phone: Not on file     Gets together: Not on file     Attends Hinduism service: Not on file     Active member of club or organization: Not on file     Attends meetings of clubs or organizations: Not on file     Relationship status: Not on file    Intimate partner violence     Fear of current or ex partner: Not on file     Emotionally abused: Not on file     Physically abused: Not on file     Forced sexual activity: Not on file   Other Topics Concern    Not on file   Social History Narrative    Not on file         ALLERGIES: Tetanus and diphtheria toxoids    Review of Systems   Constitutional: Negative for chills and fever. HENT: Negative for rhinorrhea and sore throat. Eyes: Negative for pain and redness. Respiratory: Negative for chest tightness, shortness of breath and wheezing.     Cardiovascular: Negative for chest pain and leg swelling. Gastrointestinal: Positive for abdominal pain, anal bleeding and nausea. Negative for abdominal distention, diarrhea and vomiting. Genitourinary: Negative for dysuria and hematuria. Musculoskeletal: Negative for back pain, gait problem, neck pain and neck stiffness. Skin: Negative for color change and rash. Neurological: Negative for weakness, numbness and headaches. Vitals:    09/07/20 0036   BP: 110/53   Pulse: 83   Resp: 18   Temp: 98.3 °F (36.8 °C)   SpO2: 96%   Weight: 70.3 kg (155 lb)   Height: 5' 7\" (1.702 m)            Physical Exam  Constitutional:       Appearance: Normal appearance. He is well-developed. HENT:      Head: Normocephalic and atraumatic. Neck:      Musculoskeletal: Normal range of motion and neck supple. Cardiovascular:      Rate and Rhythm: Normal rate and regular rhythm. Pulmonary:      Effort: Pulmonary effort is normal.      Breath sounds: Normal breath sounds. Abdominal:      General: Bowel sounds are normal.      Palpations: Abdomen is soft. Tenderness: There is abdominal tenderness (upper abd on left). Genitourinary:     Rectum: Guaiac result positive (grossly positive). Musculoskeletal: Normal range of motion. Skin:     General: Skin is warm and dry. Neurological:      Mental Status: He is alert and oriented to person, place, and time. MDM  Number of Diagnoses or Management Options  Diagnosis management comments: Patient was discharged from the hospital today. Has recurrent lower GI bleed with a drop in hemoglobin causing some anemia and weakness. Also with some upper abdominal pain.   Will transfuse 1 unit of blood and admit to the hospital.       Amount and/or Complexity of Data Reviewed  Clinical lab tests: ordered and reviewed  Tests in the radiology section of CPT®: ordered and reviewed  Tests in the medicine section of CPT®: ordered and reviewed    Patient Progress  Patient progress: stable Procedures

## 2020-09-07 NOTE — CONSULTS
CONSULT NOTE    John Borrego    9/7/2020    Date of Admission:  9/7/2020    The patient's chart is reviewed and the patient is discussed with the staff. Subjective:     Patient is a 60 y.o.  male seen and evaluated at the request of Dr. Radha Ashford. He has a history of pancreatic cancer with duodenal ulcerated lesion with hepatic mets. He was just admitted 9/2-/96 with hematemesis and had IR embolization of the mass. However, came back with BRBPR. Today a rapid response was called due to the patient having multiple large bloody BP's followed by hematemesis and hypotension. He required a large number of blood products and was taken emergently to the OR where he underwent ex lap with resection of distal stomach and proximal duodenum with oversewing of bleeding vessel in the second portion fo the duodenum. He was brought to the ICU intubated and hypertensive. He was given 10 of IV labetalol with BP still near 200. Review of Systems  Review of systems not obtained due to patient factors. Patient Active Problem List   Diagnosis Code    Impaired fasting glucose R73.01    Essential hypertension I10    Mixed hyperlipidemia E78.2    PAC (premature atrial contraction) I49.1    Overweight (BMI 25.0-29. 9) [id number]    Duodenal ulcer K26.9    Dietz's esophagus without dysplasia K22.70    Malignant neoplasm of head of pancreas (HCC) C25.0    Iron deficiency anemia D50.9    Hematemesis K92.0    Acute blood loss anemia D62    Pancreatic cancer (HCC) C25.9    Hypotension I95.9    Duodenal mass K31.89    Pancreatitis K85.90       Prior to Admission Medications   Prescriptions Last Dose Informant Patient Reported? Taking? cholecalciferol (VITAMIN D3) 1,000 unit tablet   Yes No   Sig: Take 1,000 Units by mouth daily.    lidocaine-prilocaine (EMLA) topical cream   No No   Sig: Apply to port about 45 minutes prior to access   ondansetron hcl (Zofran) 8 mg tablet   No No   Sig: Take 1 Tab by mouth every eight (8) hours as needed for Nausea. Indications: prevent nausea and vomiting from cancer chemotherapy   pantoprazole (PROTONIX) 40 mg tablet   Yes No   Sig: Take 40 mg by mouth two (2) times a day. prochlorperazine (Compazine) 10 mg tablet   No No   Sig: Take 1 Tab by mouth every six (6) hours as needed for Nausea. Indications: prevent nausea and vomiting from cancer chemotherapy   traMADoL (ULTRAM-ER) 100 mg Tb24   Yes No   vit B Cmplx 3-FA-Vit C-Biotin (NEPHRO VERÓNICA RX) 1- mg-mg-mcg tablet   Yes No   Sig: Take 1 Tab by mouth daily.       Facility-Administered Medications: None       Past Medical History:   Diagnosis Date    Cancer ([facility] [address]     pancreatic    GERD (gastroesophageal reflux disease)     Hernia      Past Surgical History:   Procedure Laterality Date    HX CATARACT REMOVAL Left 12/15/2017    HX HERNIA REPAIR      HX ORTHOPAEDIC Right     tibia- with hardware    IR INSERT TUNL CVC W PORT OVER 5 YEARS  2020    IR OCCL TXCATH HEMMORAGE W SI  9/3/2020     Social History     Socioeconomic History    Marital status:      Spouse name: Not on file    Number of children: Not on file    Years of education: Not on file    Highest education level: Not on file   Occupational History    Not on file   Social Needs    Financial resource strain: Not on file    Food insecurity     Worry: Not on file     Inability: Not on file    Transportation needs     Medical: Not on file     Non-medical: Not on file   Tobacco Use    Smoking status: Former Smoker     Packs/day: 1.00     Years: 10.00     Pack years: 10.00     Last attempt to quit: 2006     Years since quittin.6    Smokeless tobacco: Never Used   Substance and Sexual Activity    Alcohol use: Not Currently    Drug use: Never    Sexual activity: Not on file   Lifestyle    Physical activity     Days per week: Not on file     Minutes per session: Not on file    Stress: Not on file Relationships    Social connections     Talks on phone: Not on file     Gets together: Not on file     Attends [employer] service: Not on file     Active member of club or organization: Not on file     Attends meetings of clubs or organizations: Not on file     Relationship status: Not on file    Intimate partner violence     Fear of current or ex partner: Not on file     Emotionally abused: Not on file     Physically abused: Not on file     Forced sexual activity: Not on file   Other Topics Concern    Not on file   Social History Narrative    Not on file     Family History   Problem Relation Age of Onset    No Known Problems Mother     Cancer Father 72         leukemia    Diabetes Sister     No Known Problems Brother     No Known Problems Sister     Cancer Sister      Allergies   Allergen Reactions    Tetanus And Diphtheria Toxoids Swelling     As child       Current Facility-Administered Medications   Medication Dose Route Frequency    0.9% sodium chloride infusion 250 mL  250 mL IntraVENous PRN    ondansetron (ZOFRAN) injection 4 mg  4 mg IntraVENous Q6H PRN    promethazine (PHENERGAN) with saline injection 12.5 mg  12.5 mg IntraVENous Q6H PRN    pantoprazole (PROTONIX) 40 mg in 0.9% sodium chloride 10 mL injection  40 mg IntraVENous Q12H    octreotide (SANDOSTATIN) 500 mcg in 0.9% sodium chloride 500 mL infusion  50 mcg/hr IntraVENous CONTINUOUS    acetaminophen (TYLENOL) suppository 650 mg  650 mg Rectal Q4H PRN    diphenhydrAMINE (BENADRYL) capsule 25 mg  25 mg Oral Q6H PRN    0.9% sodium chloride infusion 250 mL  250 mL IntraVENous PRN    HYDROmorphone (PF) (DILAUDID) injection 0.5 mg  0.5 mg IntraVENous Q4H PRN    0.9% sodium chloride infusion 250 mL  250 mL IntraVENous PRN    NOREPINephrine (LEVOPHED) 4 mg in 5% dextrose 250 mL infusion  0.5-16 mcg/min IntraVENous TITRATE    0.9% sodium chloride infusion 250 mL  250 mL IntraVENous PRN    0.9% sodium chloride infusion 250 mL  250 mL IntraVENous PRN    0.9% sodium chloride infusion 250 mL  250 mL IntraVENous PRN    vasopressin (VASOSTRICT) 20 Units in 0.9% sodium chloride 100 mL infusion  0-0.1 Units/min IntraVENous TITRATE    labetaloL (NORMODYNE;TRANDATE) injection 20 mg  20 mg IntraVENous ONCE    fentaNYL in normal saline (pf) 25 mcg/mL infusion   mcg/hr IntraVENous TITRATE    lactated Ringers infusion  75 mL/hr IntraVENous CONTINUOUS         Objective:     Vitals:    09/07/20 1331 09/07/20 1335 09/07/20 1734 09/07/20 1745   BP:  106/55 190/77    Pulse: 93 (!) 110 (!) 118 90   Resp:   16 16   Temp:   97.6 °F (36.4 °C)    SpO2:  100% 100% 100%   Weight:       Height:           PHYSICAL EXAM     Constitutional:  the patient is well developed and in no acute distress  EENMT:  Sclera clear, pupils equal, oral mucosa moist  Respiratory: intubated but clear bilaterally  Cardiovascular:  RRR without M,G,R  Gastrointestinal: post surgical, bandaged and tender with absent bowel sounds. Musculoskeletal: warm without cyanosis. There is no lower extremity edema. Skin:  no jaundice or rashes, surgical wounds   Neurologic: sedated    Psychiatric:  sedated    CXR:    9/7/20  IMPRESSION: No acute process. Recent Labs     09/07/20  1600 09/07/20  1435 09/07/20  1132 09/07/20  1029  09/07/20  0153 09/07/20  0046 09/06/20  0528  09/05/20  0447   WBC 11.4*  --   --   --   --   --  16.6* 13.2*  --  13.3*   HGB 9.6* 8.2* 7.1* 7.4*   < >  --  6.7* 8.6*   < > 8.1*   HCT 28.1* 24.7* 21.4* 22.7*   < >  --  21.6* 27.0*   < > 25.4*   PLT 92*  --   --   --   --   --  185 172  --  167   INR 1.4  --   --   --   --  1.7  --   --   --   --     < > = values in this interval not displayed.      Recent Labs     09/07/20 1600 09/07/20  0046 09/06/20  0528   * 140 140   K 4.4 3.9 3.3*   * 110* 108*   * 170* 111*   CO2 24 23 26   BUN 18 16 10   CREA 1.06 0.92 0.93   CA 7.4* 7.7* 8.4   ALB  --  1.8*  --    TBILI  --  0.5  --    ALT  --  20 --      Recent Labs     09/07/20  1611 09/07/20  1531 09/07/20  1435   PHI 7.42 7.30* 7.31*   PCO2I 35.1 50.0* 41.2   PO2I 345* 375* 338*   HCO3I 22.9 24.3 20.6*     No results for input(s): LCAD, LAC in the last 72 hours. Assessment:  (Medical Decision Making)     Hospital Problems  Date Reviewed: 8/31/2020          Codes Class Noted POA    Pancreatitis ICD-10-CM: K85.90  ICD-9-CM: 151.1  9/7/2020 Yes        * (Principal) Acute blood loss anemia ICD-10-CM: D62  ICD-9-CM: 285.1  9/2/2020 Unknown        Pancreatic cancer ([facility] ICD-10-CM: C25.9  ICD-9-CM: 157.9  9/2/2020 Yes        Duodenal mass ICD-10-CM: K31.89  ICD-9-CM: 537.89  9/2/2020 Yes        Iron deficiency anemia ICD-10-CM: D50.9  ICD-9-CM: 280.9  9/1/2020 Yes        Malignant neoplasm of head of pancreas ([facility] ICD-10-CM: C25.0  ICD-9-CM: 157.0  8/31/2020 Yes        Dietz's esophagus without dysplasia ICD-10-CM: K22.70  ICD-9-CM: 530.85  8/12/2020 Yes        Essential hypertension ICD-10-CM: I10  ICD-9-CM: 401.9  5/8/2018 Yes        Mixed hyperlipidemia ICD-10-CM: E78.2  ICD-9-CM: 272.2  5/8/2018 Yes            Patient with acute blood loss anemia due to erosive pancreatic cancer taken emergently to surgery today. Now intubated and hypertensive. Plan:  (Medical Decision Making)     --hydralazine now. -start fentanyl drip for ongoing sedation. -cxr now to check ETT and central line placements. -repeat CBC now, may need plts based on last levels.   -to get hgb every 6 hours. -daily labs. -ppi  -scd's. More than 50% of the time documented was spent in face-to-face contact with the patient and in the care of the patient on the floor/unit where the patient is located. Thank you very much for this referral.  We appreciate the opportunity to participate in this patient's care. Will follow along with above stated plan.     Sandie Rico MD

## 2020-09-07 NOTE — PROGRESS NOTES
Patient vomiting BRB, diaphoretic and c/o lower chest pain. VSS. Irlanda Alejo NP notified. No new orders received.

## 2020-09-07 NOTE — PROGRESS NOTES
Guideline     Guideline Number: [id number]     Title: Management of the Patient with Mechanical Ventilation (including weaning) and ABCDE Bundle    Effective Date:  03/00    Revised Date: 02/09, 03/10, 7/12, 5/13,                                  10/13, 8/14  Reviewed Date: 07/2015, 04/2016, 06/2017       I. Policy:  Management of the patient requiring mechanical ventilation, including readiness to wean and weaning protocol. The information provided serves as a guideline for patient management. Included in this guidelines is the ABCDE Bundle to provide guidance to staff for evidence based management of pain, agitation/anxiety and delirium in the intensive care unit. The goals of critical care analgesia and sedation are to facilitate mechanical ventilation, to prevent patient and caregiver injury, and to avoid the psychological and physiologic consequences of inadequate treatment of pain, anxiety, agitation, and delirium by maintaining a light level of sedation. Pain occurs commonly in adult ICU patients, regardless of admitting diagnosis. Therefore, pain should be frequently assessed and analgesic medications titrated to prevent adverse effects associated with either inadequate or excessive analgesia. Once pain has been addressed, anxiolytic and/or antipsychotic medications can be utilized to treat unresolved agitation/anxiety and delirium, with the goal to prevent over- or under sedation by using the Henao Agitation Sedation Scale (RASS). Assertive management of these issues has been shown to reduce costs, improve ICU outcomes such as successful extubation and ICU length of stay, and allow for patients to participate in their own care. II. Purpose: The respiratory care practitioner and the critical care RN will utilize the following guideline to provide the most efficient and effective management of mechanical ventilators and weaning and extubation processes.     Goals of Treatment:  1. Adequate management of patients pain and discomfort while maintaining a light level of                   sedation (RASS score of 0 to -1)  2. Both chronic and acute sources of pain should be identified and treated. 3. Sedative agents should be considered if patient still expresses discomfort and/or is not at RASS         goal of 0 to -1 despite adequate management of pain. 4. Patients requiring neuromuscular blockage must have continuous infusions of analgesic and              sedative agents. III. Responsibility: Director Respiratory Care Services and all Respiratory Care Practitioners  with documented competency as well as Critical Care RN staff. General Guidelines    1. Introduction to Ventilator Plan Phase  a. Ventilator Management Phase, General Statement  -  The plan should be initiated in patients who have a secure airway/require invasive mechanical ventilation (endotracheal or tracheostomy) only  -   The provider determines the appropriate medications used for analgesia and agitation/anxiety along with the clinical pharmacist    2. Monitoring Levels of Comfort  a. Pain Assessment  - Pain is monitored using the numerical scales  - A pain assessment should be conducted, at a minimum, every 4 hours and as needed and per guidelines. - The level of pain should be determined as satisfactory by the patient based on patients baseline level of pain , considering any chronic pain that the patient may have. - If the patient is unable to communicate pain level, the nurse can assess for nonverbal indicators including facial grimacing, moaning, tachypnea, tachycardia, hypertension, diaphoresis, etc as a cue to begin further pain assessment.             b.  Sedation Assessment  - Sedation is monitored using the Henao Agitation Sedation Scale (RASS)  Target RASS RASS Description   +4 Combative, violent, danger to staff     +3 Pulls or removes tubes(s) or catheters; aggressive   +2 Frequent nonpurposeful movement, fights ventilator   +1 Anxious, apprehensive, but not aggressive   0 Alert and calm   -1 Awakens to voice (eye opening/contact) > 10 sec   -2 Light sedation, briefly awakens to voice (eye opening/contact) < 10 sec   -3 Moderate sedation, movement or eye opening. No eye contact   -4 Deep sedation, no response to voice, but movement or eye opening to physical stimulation   -5 Unarousable, no response to voice or physical stimulation     -  Goal RASS is 0 to -1, unless otherwise specified by providers order.  - Nursing staff should conduct the RASS every 4 hours and as needed to maintain goal RASS of 0 to -1.  - If RASS is outside of goal range, discuss treatment options with provider.  - A RASS score of +2 to +4 requires further assessment by the nurse. Causes of agitation/anxiety that should be considered include:  a. Pulmonary -   endotracheal tube malposition or patency, mode of ventilation, pneumothorax, hypoxemia, hypercarbia  b. Metabolic  hypoglycemia, hyponatremia, acute renal or hepatic failure  c. Emotional upset  with information and awareness of critical condition, prognosis, need for surgical or invasive procedures, other interventions or complications, family or personal stressors  - C. Sedation Assessment while using Neuromusclar Blocking Agents  - D. Delirium Assessment  a. the ICU ([facility]  ICU)   3. Analgesia  The incidence of significant pain has been reported to be 50% or higher in both medical and surgical ICU patients. These patients also experience discomfort during routine/procedural ICU care and at rest.  However, patients may be unable to self-report their pain (either verbally or with other signs) because of an altered level of consciousness, the use of mechanical ventilation, or high doses of sedative agents or neuromuscular blocking agents.   The short and long term consequences of unrelieved or inadequately treated pain are significant and include patient discomfort, decreased satisfaction with care by family and patient, delirium, agitation/anxiety, post traumatic stress disorder and depression. Therefore, routine assessment and treatment of pain should occur in all ICU patients. Causes and Treatment of Pain in the ICU  a. Acute pain (post-operative, procedural pain, discomfort with usual ICU care or other acute episodes of pain-related to underlying disease)  1. Consider use of PCA for alert and oriented patients with pain needs not met by PRN dosing or opoids. 2. Preemptive analgesia should occur prior to chest tube removal, and should be considered for other procedural pain such as turning and repositioning, would drain removal, wound dressing change, tracheal suctioning, femoral catheter removal or place of central venous catheter. 3. Appropriate analgesic medications for preemptive analgesia are short acting intravenous (IV) agents (i.e. fentanyl, morphine, hydromorphone)  a. Administration of analgesia before patient experiences noxious stimuli prevents amplification and hyperexcitability of the central nervous system. b. Analgesia for Mechanically Ventilated Patients:  1. The approach to sedation and analgesia management for mechanically ventilated patients favors use of analgesia first sedation. The primary goal of this strategy is to address pain and discomfort first, and then if necessary, add anxiolytic agent. 2. Analgesia first sedation reduces dose escalation of medications, decreases the duration of mechanical ventilation and the incidence of VAP, improves the probability of successful extubation, and ultimately shortens ICU length of stay. 3. For pain management, analgesic medications are determined by the provider. Intermittent dosing of the analgesic should be attempted first.    If the patient requires more than 3 doses within 1 hour then provider should be contacted to consider continuous infusion.   4.  Analgesic options for mechanically ventilated patients include:  a. Fentanyl which is considered the drug of choice for patients requiring continuous infusion. b.Morphine may be considered for those patients without renal dysfunction who are hemodynamically stable and require intermittent pain medication. Continuous infusions of morphine may be used for patientl who are receiving comfort care as part of end of life care. c.Hydromorphone is reserved for patients who are refractory to fentanyl or morphine and is typically admininstered by intermittent dosing. 4.  Agitation/Anxiety    Background  Agitation and anxiety frequently occur in ICU patients. Anxiolytic/sedation agents may be indicated to help relieve discomfort, improve synchrony with mechanical ventilation, and decrease the overall work of breathing. Pain control alone may be sufficient to make patients comfortable enough to require no anxiolytic/sedative agent. In addition, non-pharmacologic interventions such as repositioning or verbal assurance may be helpful to comfort or redirect an agitated patient. If these methods are unsuccessful, then anxiolytic/sedative medications such as propofol, dexmedetomidine, or benzodiazepines can be used. Selection of an anxiolytic should be based on the pharmacokinetic properties of the medication, patient specific characteristics, and sedation goal.   However, nonbenzodiazepine sedatives (ie propofol or dexmedetomidine) may be preferable over benzodiazepines (ie midazolam or larazepam) due to more favorable outcomes such as delirum. Causes and Treatment of Agitation/Anxiety  a. Possible underlying causes of agitation and anxiety include pain, delirium, hypoxemia, hypoglycemia, hypotension, or withdrawal from alcohol  and other drugs. b. Analgesia first sedation should be attempted initially to manage pain and provide sedation in appropriate patients. Analgesia alone may be adequate to reach RASS goal of 0 to -1.   If patient remains agitated or anxious despite adequate analgesia (ie RASS +2 to +4) then anxiolytic/sedative should be considered. c. The choice of anxiolytic should be based on desired levels of sedation (ie light sedation or deep sedation) with preference for the use of nonbenzodiazepines such as propofol or dexmedetomidine if appropriate. While light sedation (ie RASS 0 to -1) is preferred for most patients, there are instances when deep sedation (ie RASS -4 to -5) is desired. For example, in the setting of ventilator dysynchrony due to ARDS or for patients receiving NMB agents. d. Medications to maintain light sedation (ie RASS 0 to -1) include  1. Propofol continuous infusion can be considered for hemodynamically stable (ie SBP = 100, MAP = 65 and/or not requiring vasopressor support) patients requiring light sedation. Propofol has a quick onset (1-2 minutes) and offset of action, making it a good agent to assess neurological status and facilitate liberation from the mechanical ventilator. 2.Dexmedetomidine continuous infusion is a good option for hemodynamically stable patients requiring light sedation as it allows for a more awake, interactive patient is associated with less delirium. It has an intermediate onset of action (5-10 min). Therefore, abrupt titrations should be avoided, but use of prn haloperidol or benzodiazepine may be useful to manage agitation until the medication takes effect. 3. Antipsychotics are another option. In particular, haloperidol intermittently dosed may be useful for patients with symptoms of agitation/anxiety and delirium. 4.Benzodiazapines can also be considered for light sedation, but should be intermittently doses. Midazolam is an option for patients without renal dysfunction. It has a short onset of action (2-5 minutes) making it a good agent for acute agitation/anxiety, but short duration of action resulting in frequent dosing. Lorazepam is another option.   It has a longer onset of action (15-20 minutes) in comparison to midazolam, but longer duration of action. e. Medications to maintain deep sedation (RASS -4 to -5) include:  1) Propofol continuous infusion should be considered as a first line option for hemodynamically stable patients. 2)Benzodiazepines can be considered as second line options for deep sedation. Studies comparing these agents to other sedatives have shown that they lead to worse outcomes including delirium, oversedation, delayed extubation, and longer time to discharge. Midazolam is one option for patients without renal dysfunction and lorazepam is another options. If patient requires more than 3 doses within 1 hour then contact provider  to consider initiation of continuous infusion. 5.  Daily Sedation Awakening Trial (SAT) from IV Continuous Analgesia/Sedation  a. Patients are to have daily awakening from sedation while on continuous IV analgesia and/or sedation in the ICU. Continuous analgesia infusions may be maintained only if needed for active pain and RASS is at goal 0 - -1. Unit guideline is for the SAT to occur following ICP rounds each morning.    b. The sedation awakening trial (SAT) is done regardless if the patient meets criteria for spontaneous breathing trial (SBT). c. SAT safety screen is assessed and SAT should not be performed if sedation is being used for active seizures, alcohol withdrawal, hemodynamically unstable or requiring support of vasoactive medications , in conjunction with NMB agents, if ICP is greater than  20mmHg or if sedation is being used to control ICP, patients RASS is +3 or +4 (very agitated or combative). Other exclusion criteria are:  if there is documentation of myocardial ischemia in the past 24 hours; or patient is receiving high frequency oscillator ventilation (HFOV) , if the patient has an open chest /abdomen or is receiving comfort care.   d. Criteria for passing the SAT are the patient opened their eyes to verbal stimuli or tolerated sedative interruption without exhibiting failure criteria.  e. Patients fail the SAT if the develop sustained anxiety, agitation, or pain; a respiratory rate of 35 per minutes for 5 minutes or longer, an SpO2 less than 88% for 5 minutes or longer; an acute cardiac dysrhythmia; two or more signs of respiratory distress including tachycardia, bradycardia; use of accessory muscles; diaphoresis; marked dyspnea; or myocardial ischemia. f. Respiratory therapy staff must verify with the nurse that continuous IV analgesia (unless being use  for active pain) and sedation (unless patient is receiving dexmedetomidine) is off prior to placing patient on SBT. g. DO NOT interrupt infusion of analgesia and sedation medications if patient is receiving neuromuscular blockade.  h. Monitor level of wakefulness unless patient is awake and follows commands (RASS 0 to -1) or patient becomes uncomfortable or agitated (RASS +3 to +4)  i. If agitation prevents successful awakening , administer bolus of analgesia and/or sedation then resume infusion of the medication at ½ previous dose and titrate as needed.  j. If oversedation prevents successful awakening, hole infusion until at goal and resume ½ of prior infusion rate/dose if clinically indicated. 6. Delirium  Background  Delirium is characterized by the acute onset of cerebral dysfunction with a change or fluctuation baseline  mental status, inattention, and either disorganized thinking or an altered level of consciousness. It affects up to 80% of mechanically ventilated adult ICU patients, and is associated with increased mortality,   and treatment is important and may in turn allow for a patient to be conscious yet cooperative enough to participate   in ventilator weaning trials and early mobilization efforts.     Delirium can only be assessed in patients who are able to sufficiently interact and communicate with bedside  clinicians (ie RASS -3 to +4). IV. Procedure:  A. Assessment: The following criteria must be assessed prior to the initiation of weaning from mechanical ventilation. Note: The criteria are general guidelines and must be individualized for each patient. The patients primary nurse will be responsible, in coordination with the RT, the Spontaneous Awakening Trial). The RT will perform the SBT. B. Spontaneous Awakening Trials (SATs  also referred to as Sedation Vacation) and Spontaneous Breathing Trials (SBTs) performed to determine readiness to wean. 1. For patients who meet established criteria, such as those without active seizures, alcohol withdrawal and agitation, myocardial ischemia or those requiring cardiac support devices, without increased intracranial pressure and those not receiving neuromuscular blockade, the nurse will reduce the infusions of sedative by 50% of current used for sedation that was used to achieve a level of light sedation (Kearney Score 2 or RASS score of 0 to -1) and evaluate patient response to reduction of sedation. Analgesics required for pain control are continued during the test.  Obtain MD order to cover no SAT for that time period if patient has any exclusion criteria as described above. 2. Failure of the spontaneous awakening trial occurs when the patient shows symptoms such as increased agitation, anxiety, pain or signs of respiratory distress including respiratory rate >35/min or oxygen saturation <88% as well as development of acute cardiac arrhythmias. If these symptoms develop during the SAT, the nurse then restarts sedation at 75% of the previous dose and titrates the medications until the patient is comfortable and/or symptoms have abated. 3.  If the patient passes the SAT then the patient moves on to the Spontaneous Breathing Trials as performed by the RT.    The SBT Safety Screen included the following:  No agitation, oxygen saturation > 88%, FIO2 < 50%, PEEP < 7.5 cm H20, no myocardial ischemia, no vasopressor use, and with inspiratory efforts. 4. Patients who pass the spontaneous awakening trial but fail the spontaneous breathing trial are placed back on full ventilator support and reassessed the next day. 5. Failure of the SBT (spontaneous breathing trail) includes any of the following:  Respiratory rate > 35/min, respiratory rate < 8/min, oxygen saturation < 88%, respiratory distress, mental status change, acute cardiac arrhythmia. 6. Extubation is considered for patients who tolerate the spontaneous awakening trial and pass the spontaneous breathing trial.    C. Can the cause of respiratory failure be reversed (i.e. absence of high spinal cord injury or advanced ALS)? D. Is gas exchange adequate? 1. PaO2/FIO2 ratio > 150  200,  2. PEEP < 8 cm H20  3. FIO2 < 50  4. pH > 7.30  5. Rapid shallow breathing index (f/VT) < 105  E. Is patient hemodynamically stable? 1. Absence of clinically significant hypotension (minimal vasopressors such as Dopamine < 5mcg/kg/minute)? F. Is there evidence of intact respiratory drive (NIP/NIF >-86 FBS96, stable VC02)? G. Does patient have an adequate cough, airway clearance ability? H. Is there absence of excessive secretions? V. Initiation:     A. The therapist shall consult with RN to determine if sedation can be discontinued or significantly decreased. If this can be achieved, the therapist shall implement the                     followin. Identify patient and verify name and account number via ID [email or web address]. 2. Perform hand hygiene per hospital policy utilizing Standard Precautions for all patients and following transmission-based isolation as indicated per hospital policy. 3. Perform a ONE-MINUTE SPONTANEOUS TRIAL AND ASSESSMENT. 4. Measure Rapid Shallow Breathing Index (RSBI) and monitor SpO2 and cardiovascular parameters during the spontaneous breathing assessment.   5. If SpO2 and cardiovascular parameters are stable, continue spontaneous breathing trial for at least 30 minutes and up to 120 minutes, as patient tolerates. 6. Monitor ventilatory status, SpO2, and cardiovascular status during spontaneous breathing trial.  7. If patient has a successful trial, consider patient as a candidate for extubation and obtain order. 8. If patient fails the weaning trial, place back on ventilator and adjust settings to provide a non-fatiguing form of ventilatory support for the remainder of the day and night. 9. One attempt at weaning shall be performed each day until successful weaning occurs. The RCP will make every attempt to begin the spontaneous breathing trials between 0500 and 0600 to provide documentation of the trial when the pulmonologist makes rounds. B.  Assessment of SBT or PST:  1. Is gas exchange acceptable? 2. PaO2 > 60 mm Hg. 3. PH > 7.30  4. Increase in PaCO2  < 10 mm Hg  C. Is patient hemodynamically stable? 1. HR < 120 beats/minute  2. HR  < 20%   3. Systolic BP < 674 and > 90 mmHg  4. BP  < 20%, no vasopressors required  D. Does patient have stable ventilatory pattern? 1. Sustained RR < 30 breaths per minute  2. Normal and stable VCO2  3. Patient is not demonstrating any signs of increased work of breathing, such as increased use of accessory muscles. E. Mental status stable throughout trial?  1. Absence of changes such as somnolence, excessive agitation or anxiety  2. Absence of diaphoresis during trial?  IV. Safety:    A. The RCP shall monitor patient according to the above guidelines. If at any time during the weaning process, the respiratory therapist or nurse feels that the patient is not tolerating weaning, the therapist shall place patient back on previous ventilator settings. B. The patient shall be reassessed and the weaning process should be continued the following day. V. Reportable Conditions:    A.  The therapist shall notify the physician, as appropriate, for any of the following conditions:  1. FIO2 increase (sustained) at 10% or greater  2. Poor patient/ventilator interface in spite of adjustments  3. Need for increased sedation for respiratory distress  4. Need for increasing ventilating pressures (i.e. PEEP, PIP, MAP)  5. ABG results meeting panic value criteria or other clinical signs indicating deterioration of patients condition. 6. Unplanned extubation. 7. Unexplained sustained increase in PIP greater than 10 cm H2O.  8. Assessment results regarding ventilator discontinuance process. VI. Ventilator protocol management   A. The following items should be maintained for patients who are being mechanically           ventilated. 1. Obtain STAT Chest X-Ray for ET tube placement after insertion. 2. Chest X-Ray q a.m. while on ventilator. 3. ABGs 30 - 60 minutes after being stable on the ventilator. 4. ABG's q a.m. while on ventilator and prn.  5. Do spontaneous breathing trials when patient is hemodynamically stable, responsive, and without fever. 6. Terminate trials if patient exhibits signs of respiratory distress. 7. Therapists should maintain ABG s as follows:      a. pH -  7.30 - 7.50                   b. PaO2 -   60  100        8. Racemic Epinephrine (0.5cc) for post extubation stridor (2 UA treatments max.)    VII.   Early Mobilization    Mobility Level Criteria Start at Level 1 if:   PaO2/FIO2 <250   Positive end-expiratory Pressure (PEEP) >=10 cm H2O   O2 saturation <90%   Respiratory Rate (RR) Not within 10-30 per min   Cardiac arrhythmias or ischemia New onset   Heart Rate  (HR) <60 or >120 beats per min   Mean arterial pressure (MAP) <55 or >473 mmHg   Systolic blood pressure (SBP) <90 or > 180 mmHg   Vasopressor infusion New or increasing   Henao Agitation Scale (RASS) < - 3       Level I:   Breathe  (Rass -5 to -3)  HOB Angle  improve VAP protocol compliance   Visually confirm the [facility] is elevated >= 30 degrees to comply with VAP prevention protocols   The Centers for Disease Control and Prevention recommends an HOB angle of 30-45 degrees , unless contraindicated  Additional activities to be implemented   Every 2 hour turning   Passive range of motion   Up to 20 degrees reverse trendelenburg with lower extremity exercise/retracting footboard   Continuous lateral rotation therapy can be considered part of early mobility therapy in patients who are at high risk for pulmonary complications  Move to Level 2 when the patient  - Has acceptable oxygenation/hemodynamics  - Tolerates q 2 turning  - Tolerates HOB > 30 degrees or up to 20 degrees reverse trendelenburg    Level 2 :Tilt  Patient Assessment Rass > -3  (eg, opens eyes, may have profound weakness)  Up to 20 degrees Reverse Trendelenburg position and 10 degrees minimum HOB  - Reverse Trendelenburg positioning allows for orthostatic position in fragile patients  - If available , use in conjunction with retracting foot section to allow for partial weight bearing prior to sitting up in the bed or getting out of bed  Additional activities to be implemented  -  Maintain HOB >/= 30 degrees  - Q 2 hour turning  - Passive/active range of motion  - Legs dependent  - PT consultation       Move to Level 3 when the patient . .    -Tolerates active- assistance exercises 2 times per day    -Tolerates lower extremity exercises against footboard/Up to 20 degrees Reverse Trendelenburg    -Tolerates legs dependent /HOB 45 degrees  Level 3 :  SIT  (Rass >- 1 (eg , weak but may move arms/legs independently)   Full chair position (footboard on)   Full upright positioning allows for diaphragmatic excursion and lung expansion   Sitting with legs in a dependent position facilitates gas exchange  Additional activities to be implemented  - Maintain HOB >= 30 degrees  - Q 2 hour turning (assisted)  - Active range of motion  PT/OT actively involved  - Encourage activities of daily living  - Dangling, if patient can move arm against gravity  Move to Level 4 when the patient . .  - Tolerates increasing active exercise in bed  - Actively assists with every- 2- hour turning or turns independently  - Tolerates full chair position 3 times/day  Level 4:  Stand ( RASS >0 (eg, weak but may tolerate increased activity)      Stand Attempts   Full chair position (footboard off/feet on the floor)   Consider using a sit-to-stand lift   Pivot to chair, it tolerates partial weight bearing  Additional activities to be implemented  - Maintain head of bed >= 30 degrees  - Q 2 hr turning (self/assisted)  - Active range of motion  - Encourage activities of daily living  - PT/OT actively involved      Move to Level 5 when the patient .  - Can successfully comply with all activities  - Tolerates trial periods of full chair position (footboard off/feet on floor) 3 times per day  - Tolerates partial weight-bearing stand and pivots to chair    Level 5 :  Move  (RASS > 0    (eg, weak but may tolerate increased activity)   Achieve out of bed   Utilize mobile floor life to ambulate to bedside chair  Additional activities to be implemented  - Maintain HOB > = 30 degrees  - Q 2 hour turning (self/assisted)  - Active range of motion  - Patient stands/bears weight > 1 minute  - Patient marches in place  - PT/OT actively involved    Patient continues to ambulate progressively longer distances as tolerated until they consistently participate and move independently.         E Approved by [id number] 2-19-09   N [facility] Clinical Guidelines             BONITA ALVAREZ [facility] Flowsheet Content Variables to select when addressing section Comments   BONITA Initiated  Yes/No  RN to address minimum q 24 hours (day shift)   Target RASS  0 = alert and oriented   -1 = drowsy   -2 = light sedation   -3= moderate sedation   -4= deep sedation Target on standard ventilator setting should be -2; -4 with oscillator   CAM -ICU  Positive   Negative   Unable to assess Delirium assessment   SAT Safety Screen Passed  Yes   No Select yes if proceeding on to the sedation vacation (reduction of continuous sedative drip by directed by MD)  Select no if your patient has any of the below reasons for not proceeding on to the daily awakening sedation vacation trial   SAT Screen for Failure  Active seizures   Acute delirium tremors   Agitation that threatens accidental line/tube removal   On paralytics   MI (24-48hr)   Abnormal ICP   Open abdomen Select one of the options when the patient will not undergo the sedation vacation  ALSO MUST OBTAIN AN ORDER FOR no [address] written under nursing miscellaneous for now by either the NP or Intensivist   Daily sedation Vacation/assessment of   Yes   No   Not applicable If yes, MUST see the reduction in sedation on the [facility] and please place in the comment section of the sedative sedation vacation started   SBT Safety Screen Passed  Yes   No Select Yes if the patient has none of the below listed reasons for not proceeding on to the SBT following reduction of sedation   SBT Screen Reason for Failure  Agitation   O2 Sat < or = 88%   FIO2 > 50%   PEEP >7   MI   Vasopressor Use   Bilevel setting on Vent   Oscillator in use   Increased resp effort Select reason as appropriate for NOT proceeding on to the SBT                                               Wake Up and Breathe Protocol

## 2020-09-07 NOTE — ANESTHESIA PROCEDURE NOTES
Central Line Placement    Start time: 9/7/2020 4:46 PM  End time: 9/7/2020 5:01 PM  Performed by: Mariya Villagran MD  Authorized by: Mariya Villagran MD     Indications: vascular access and central pressure monitoring  Preanesthetic Checklist: patient identified, risks and benefits discussed, anesthesia consent, patient being monitored and timeout performed    Timeout Time: 16:45       Pre-procedure: All elements of maximal sterile barrier technique followed? Yes    2% Chlorhexidine for cutaneous antisepsis, Hand hygiene performed prior to catheter insertion and Ultrasound guidance    Sterile Ultrasound Technique followed?: Yes        Ultrasound Image Stored? Image stored    Procedure:   Prep:  ChloraPrep (Use of full body drape after Chloraprep)    Orientation:  Left (Exisiting port in right SC.)  Patient position:  Trendelenburg  Catheter type:  Quad lumen    Catheter length:  20 cm  Number of attempts:  1  Successful placement: Yes      Assessment:   Post-procedure:  Catheter secured, sterile dressing applied and sterile dressing with CHG applied  Assessment:  Blood return through all ports, free fluid flow and guidewire removal verified  Insertion:  Uncomplicated  Patient tolerance:  Patient tolerated the procedure well with no immediate complications  Potential vascular access sites examined with ultrasound and an acceptable, patent site selected as noted above. Needle path and vascular access visualized in real time using ultrasound. As noted above, a confirmatory image is permanently stored in the chart. Central line placed using Seldinger technique. Easy introducer and central line insertion to 16 centimeters. Sterile tegaderm applied. No complications. CXR to be performed in ICU. Seven step protocol followed.

## 2020-09-07 NOTE — ANESTHESIA POSTPROCEDURE EVALUATION
Procedure(s):  LAPAROTOMY EXPLORATORY /  CHOLECYSTECTOMY. general    Anesthesia Post Evaluation      Multimodal analgesia: multimodal analgesia not used between 6 hours prior to anesthesia start to PACU discharge  Patient location during evaluation: ICU  Patient participation: complete - patient cannot participate  Post-procedure mental status: sedated.   Pain management: adequate  Airway patency: patent  Anesthetic complications: no  Cardiovascular status: hemodynamically unstable (Requiring Levophed for cardiovascular support)  Respiratory status: acceptable, ventilator, intubated and ETT  Hydration status: acceptable (Will likely need ongoing resuscitation, but last ABG looked good.)  Post anesthesia nausea and vomiting:  none  Final Post Anesthesia Temperature Assessment:  Normothermia (36.0-37.5 degrees C)      INITIAL Post-op Vital signs:   Vitals Value Taken Time   /77 9/7/2020  5:34 PM   Temp 36.4 °C (97.6 °F) 9/7/2020  5:34 PM   Pulse 118 9/7/2020  5:34 PM   Resp 16 9/7/2020  5:34 PM   SpO2 100 % 9/7/2020  5:34 PM

## 2020-09-07 NOTE — CONSULTS
Inpatient Hematology / Oncology Consult Note    Reason for Consult:  Acute blood loss anemia [D62]; Duodenal mass [K31.89]  Referring Physician:  Jennie Aguirre MD    History of Present Illness:  Mr. oJn Echavarria is a 60 y.o. male admitted on 9/7/2020 with a primary diagnosis of The primary encounter diagnosis was Gastrointestinal hemorrhage, unspecified gastrointestinal hemorrhage type. Diagnoses of Anemia, unspecified type and Malignant neoplasm of pancreas, unspecified location of malignancy [facility]) were also pertinent to this visit. .      60 male, recent diagnosis of pancreatic mass with duodenal ulcerated lesion (involving more than 3/4 of circumference) and hepatic metastasis, felt to likely represent metastatic pancreatic cancer, recently hospitalized with hematemesis from 9/2/20-9/6/20. He had required IR directed angiography with embolization of likely pancreatic mass that had eroded into duodenum. He had also received infed during admission. He was felt clinically stable for discharge, however now readmitted w BPR. On evaluation today, pt w rapid reponse being called as had multiple large blood bowel movements as well as single hematemesis, currently hypotensive. He is s/p 3 unit PRBC, w/ plans for FFP. Review of Systems:  As mentioned above. All other systems reviewed in full and are unremarkable.        Allergies   Allergen Reactions    Tetanus And Diphtheria Toxoids Swelling     As child     Past Medical History:   Diagnosis Date    Cancer ([address]     pancreatic    GERD (gastroesophageal reflux disease)     Hernia      Past Surgical History:   Procedure Laterality Date    HX CATARACT REMOVAL Left 12/15/2017    HX HERNIA REPAIR  2007    HX ORTHOPAEDIC Right 1982    tibia- with hardware    IR INSERT TUNL CVC W PORT OVER 5 YEARS  9/4/2020    IR OCCL Dorsie Lions W SI  9/3/2020     Family History   Problem Relation Age of Onset    No Known Problems Mother     Cancer Father 72  leukemia    Diabetes Sister     No Known Problems Brother     No Known Problems Sister     Cancer Sister      Social History     Socioeconomic History    Marital status:      Spouse name: Not on file    Number of children: Not on file    Years of education: Not on file    Highest education level: Not on file   Occupational History    Not on file   Social Needs    Financial resource strain: Not on file    Food insecurity     Worry: Not on file     Inability: Not on file   [employer] needs     Medical: Not on file     Non-medical: Not on file   Tobacco Use    Smoking status: Former Smoker     Packs/day: 1.00     Years: 10.00     Pack years: 10.00     Last attempt to quit: 2006     Years since quittin.6    Smokeless tobacco: Never Used   Substance and Sexual Activity    Alcohol use: Not Currently    Drug use: Never    Sexual activity: Not on file   Lifestyle    Physical activity     Days per week: Not on file     Minutes per session: Not on file    Stress: Not on file   Relationships    Social connections     Talks on phone: Not on file     Gets together: Not on file     Attends Church service: Not on file     Active member of club or organization: Not on file     Attends meetings of clubs or organizations: Not on file     Relationship status: Not on file    Intimate partner violence     Fear of current or ex partner: Not on file     Emotionally abused: Not on file     Physically abused: Not on file     Forced sexual activity: Not on file   Other Topics Concern    Not on file   Social History Narrative    Not on file     Current Facility-Administered Medications   Medication Dose Route Frequency Provider Last Rate Last Dose    0.9% sodium chloride infusion 250 mL  250 mL IntraVENous TORIN Tc Gutierrez MD        0.9% sodium chloride infusion  75 mL/hr IntraVENous CONTINUOUS Blanca Ackerman MD 75 mL/hr at 20 0535 75 mL/hr at 20 0535    ondansetron (ZOFRAN) injection 4 mg  4 mg IntraVENous Q6H PRN Efe Schwarz MD   4 mg at 09/07/20 1335    promethazine (PHENERGAN) with saline injection 12.5 mg  12.5 mg IntraVENous Q6H PRN Efe Schwarz MD        pantoprazole (PROTONIX) 40 mg in 0.9% sodium chloride 10 mL injection  40 mg IntraVENous Q12H Collins Crowe MD   40 mg at 09/07/20 0855    octreotide (SANDOSTATIN) 500 mcg in 0.9% sodium chloride 500 mL infusion  50 mcg/hr IntraVENous CONTINUOUS Efe Schwarz MD 50 mL/hr at 09/07/20 0627 50 mcg/hr at 09/07/20 0627    acetaminophen (TYLENOL) suppository 650 mg  650 mg Rectal Q4H PRN Efe Schwarz MD        diphenhydrAMINE (BENADRYL) capsule 25 mg  25 mg Oral Q6H PRN Efe Schwarz MD        0.9% sodium chloride infusion 250 mL  250 mL IntraVENous PRN Efe Schwarz MD        HYDROmorphone (PF) (DILAUDID) injection 0.5 mg  0.5 mg IntraVENous Q4H PRN Efe Schwarz MD        0.9% sodium chloride infusion 250 mL  250 mL IntraVENous PRN Ant Mcgowan NP        NOREPINephrine (LEVOPHED) 4 mg in 5% dextrose 250 mL infusion  0.5-16 mcg/min IntraVENous TITRATE Edmonia Itzel C, DO   Stopped at 09/07/20 1455    0.9% sodium chloride infusion 250 mL  250 mL IntraVENous PRN Edmonia Itzel C, DO        0.9% sodium chloride infusion 250 mL  250 mL IntraVENous PRN Greco Maria Luz C, DO        0.9% sodium chloride infusion 250 mL  250 mL IntraVENous PRN Greco Maria Luz C, DO        vasopressin (VASOSTRICT) 20 Units in 0.9% sodium chloride 100 mL infusion  0-0.1 Units/min IntraVENous TITRATE Edmonia Itzel C, DO         Facility-Administered Medications Ordered in Other Encounters   Medication Dose Route Frequency Provider Last Rate Last Dose    midazolam (VERSED) injection   IntraVENous PRN Deloris Starr MD   1 mg at 09/07/20 1428    rocuronium injection    PRN Deloris Starr MD   50 mg at 09/07/20 1414    fentaNYL citrate (PF) injection   IntraVENous PRN Deloris Starr MD   25 mcg at 20 1508    calcium chloride injection    PRN Esequiel George MD   1,000 mg at 20 1435    sodium bicarbonate 8.4 % (1 mEq/mL) injection   IntraVENous PRN Esequiel George MD   50 mEq at 20 1439    ceFAZolin (ANCEF) 2 g/20 mL in sterile water IV syringe   IntraVENous PRN Esequiel George MD   2 g at 20 1500    lidocaine (PF) (XYLOCAINE) 20 mg/mL (2 %) injection   IntraVENous PRN Esequiel George MD   40 mg at 20 1409    succinylcholine (ANECTINE) injection    PRN Esequiel George MD   120 mg at 20 1409    etomidate (AMIDATE) 2 mg/mL injection    PRN Esequiel George MD   16 mg at 20 1409    0.9% sodium chloride infusion    CONTINUOUS Esequiel George MD        0.9% sodium chloride infusion    CONTINUOUS Esequiel George MD           OBJECTIVE:  Patient Vitals for the past 8 hrs:   BP Temp Pulse Resp SpO2   20 1335 106/55  (!) 110  100 %   20 1331   93     20 1327   100  98 %   20 1325   95  100 %   20 1323 106/55  (!) 110  100 %   20 1321   97  100 %   20 1320 (!) 83/45  93  94 %   20 1316 (!) 79/51  (!) 102     20 1312 92/53  (!) 106     20 1303 97/51  95 18 97 %   20 1257 (!) 89/47  95  100 %   20 1243 (!) 80/45  (!) 118  100 %   20 1242 (!) 66/42  (!) 128  100 %   20 1236 (!) 85/36       20 1232   (!) 109     20 1230 103/43  (!) 101     20 1221 (!) 67/32  (!) 110     20 1218 (!) 54/31 98.4 °F (36.9 °C) (!) 110 19 96 %   20 1032 105/58 98.4 °F (36.9 °C) 87 19 95 %   20 0936 95/52 98.6 °F (37 °C) 85 18 98 %   20 0843 97/57 98.4 °F (36.9 °C) 75 17 95 %   20 0807 94/53         Temp (24hrs), Av.3 °F (36.8 °C), Min:97.9 °F (36.6 °C), Max:98.8 °F (37.1 °C)    701 -  1900  In: 3351.6 [I.V.:1500]  Out: 2650 [Urine:50]    Physical Exam:  Constitutional: Well developed, well nourished male in no acute distress, sitting comfortably in the hospital bed. HEENT: Normocephalic and atraumatic. Oropharynx is clear, mucous membranes are moist.  Pupils are equal, round, and reactive to light. Extraocular muscles are intact. Sclerae anicteric. Neck supple without JVD. No thyromegaly present. Lymph node   No palpable submandibular, cervical, supraclavicular, axillary or inguinal lymph nodes. Skin Warm and dry. No bruising and no rash noted. No erythema. No pallor. Respiratory Lungs are clear to auscultation bilaterally without wheezes, rales or rhonchi, normal air exchange without accessory muscle use. CVS Normal rate, regular rhythm and normal S1 and S2. No murmurs, gallops, or rubs. Abdomen Soft, nontender and nondistended, normoactive bowel sounds. No palpable mass. No hepatosplenomegaly. Neuro Grossly nonfocal with no obvious sensory or motor deficits. MSK Normal range of motion in general.  No edema and no tenderness. Psych Appropriate mood and affect.         Labs:    Recent Results (from the past 24 hour(s))   TYPE & SCREEN    Collection Time: 09/07/20 12:42 AM   Result Value Ref Range    Crossmatch Expiration 09/10/2020     ABO/Rh(D) Coto Fatmata POSITIVE     Antibody screen NEG     Comment       NOTIFIED NATALIA AT 0142 9.7.20 THAT UNIT OF RBCs WAS READY      Unit number [id number]     Blood component type  LR     Unit division 00     Status of unit ISSUED     Crossmatch result Compatible     Unit number [id number]     Blood component type RC LR     Unit division 00     Status of unit ISSUED     Crossmatch result Compatible     Unit number [id number]     Blood component type RC LR     Unit division 00     Status of unit ISSUED     Crossmatch result Compatible     Unit number [id number]     Blood component type RC LR     Unit division 00     Status of unit ISSUED     Crossmatch result Compatible     Unit number [id number]     Blood component type RC LR     Unit division 00 Status of unit ISSUED     Crossmatch result Compatible     Unit number [id number]     Blood component type RC LR     Unit division 00     Status of unit ISSUED     Crossmatch result Compatible     Unit number [id number]     Blood component type RC LR     Unit division 00     Status of unit ISSUED     Crossmatch result Compatible     Unit number [id number]     Blood component type RC LR     Unit division 00     Status of unit ISSUED     Crossmatch result Compatible     Unit number [id number]     Blood component type RC LR     Unit division 00     Status of unit ISSUED     Crossmatch result Compatible     Unit number [id number]     Blood component type RC LR     Unit division 00     Status of unit ISSUED     Crossmatch result Compatible     Unit number [id number]     Blood component type RC LR     Unit division 00     Status of unit ISSUED     Crossmatch result Compatible     Unit number [id number]     Blood component type RC LR     Unit division 00     Status of unit ISSUED     Crossmatch result Compatible     Unit number [id number]     Blood component type RC LR     Unit division 00     Status of unit ISSUED     Crossmatch result Compatible     Unit number [id number]     Blood component type RC LR     Unit division 00     Status of unit ISSUED     Crossmatch result Compatible     Unit number [id number]     Blood component type RC LR     Unit division 00     Status of unit ISSUED     Crossmatch result Compatible     Unit number [id number]     Blood component type RC LR     Unit division 00     Status of unit ALLOCATED     Crossmatch result Compatible     Unit number [id number]     Blood component type RC LR     Unit division 00     Status of unit ALLOCATED     Crossmatch result Compatible     Unit number [id number]     Blood component type RC LR     Unit division 00     Status of unit ALLOCATED     Crossmatch result Compatible     Unit number [id number]     Blood component type RC LR     Unit division 00     Status of unit ALLOCATED     Crossmatch result Compatible    CBC WITH AUTOMATED DIFF    Collection Time: 09/07/20 12:46 AM   Result Value Ref Range    WBC 16.6 (H) 4.3 - 11.1 K/uL    RBC 2.34 (L) 4.23 - 5.6 M/uL    HGB 6.7 (LL) 13.6 - 17.2 g/dL    HCT 21.6 (L) 41.1 - 50.3 %    MCV 92.3 79.6 - 97.8 FL    MCH 28.6 26.1 - 32.9 PG    MCHC 31.0 (L) 31.4 - 35.0 g/dL    RDW 18.1 (H) 11.9 - 14.6 %    PLATELET 436 633 - 370 K/uL    MPV 10.7 9.4 - 12.3 FL    ABSOLUTE NRBC 0.04 0.0 - 0.2 K/uL    DF AUTOMATED      NEUTROPHILS 74 43 - 78 %    LYMPHOCYTES 5 (L) 13 - 44 %    MONOCYTES 12 4.0 - 12.0 %    EOSINOPHILS 5 0.5 - 7.8 %    BASOPHILS 0 0.0 - 2.0 %    IMMATURE GRANULOCYTES 4 0.0 - 5.0 %    ABS. NEUTROPHILS 12.2 (H) 1.7 - 8.2 K/UL    ABS. LYMPHOCYTES 0.8 0.5 - 4.6 K/UL    ABS. MONOCYTES 1.9 (H) 0.1 - 1.3 K/UL    ABS. EOSINOPHILS 0.9 (H) 0.0 - 0.8 K/UL    ABS. BASOPHILS 0.1 0.0 - 0.2 K/UL    ABS. IMM. GRANS. 0.7 (H) 0.0 - 0.5 K/UL   METABOLIC PANEL, COMPREHENSIVE    Collection Time: 09/07/20 12:46 AM   Result Value Ref Range    Sodium 140 136 - 145 mmol/L    Potassium 3.9 3.5 - 5.1 mmol/L    Chloride 110 (H) 98 - 107 mmol/L    CO2 23 21 - 32 mmol/L    Anion gap 7 7 - 16 mmol/L    Glucose 170 (H) 65 - 100 mg/dL    BUN 16 8 - 23 MG/DL    Creatinine 0.92 0.8 - 1.5 MG/DL    GFR est AA >60 >60 ml/min/1.73m2    GFR est non-AA >60 >60 ml/min/1.73m2    Calcium 7.7 (L) 8.3 - 10.4 MG/DL    Bilirubin, total 0.5 0.2 - 1.1 MG/DL    ALT (SGPT) 20 12 - 65 U/L    AST (SGOT) 21 15 - 37 U/L    Alk.  phosphatase 96 50 - 136 U/L    Protein, total 4.6 (L) 6.3 - 8.2 g/dL    Albumin 1.8 (L) 3.2 - 4.6 g/dL    Globulin 2.8 2.3 - 3.5 g/dL    A-G Ratio 0.6 (L) 1.2 - 3.5     LIPASE    Collection Time: 09/07/20 12:46 AM   Result Value Ref Range    Lipase 1,204 (H) 73 - 393 U/L   PROTHROMBIN TIME + INR    Collection Time: 09/07/20  1:53 AM   Result Value Ref Range    Prothrombin time 20.3 (H) 12.0 - 14.7 sec    INR 1.7 PTT    Collection Time: 09/07/20  1:53 AM   Result Value Ref Range    aPTT 33.7 24.3 - 35.4 SEC   HGB & HCT    Collection Time: 09/07/20  5:32 AM   Result Value Ref Range    HGB 7.5 (L) 13.6 - 17.2 g/dL    HCT 23.3 (L) 41.1 - 50.3 %   HGB & HCT    Collection Time: 09/07/20 10:29 AM   Result Value Ref Range    HGB 7.4 (L) 13.6 - 17.2 g/dL    HCT 22.7 (L) 41.1 - 50.3 %   FIBRINOGEN    Collection Time: 09/07/20 10:29 AM   Result Value Ref Range    Fibrinogen 156 (L) 190 - 501 mg/dL   PLASMA, ALLOCATE    Collection Time: 09/07/20 10:45 AM   Result Value Ref Range    Unit number [id number]     Blood component type FP 24h, Thaw     Unit division 00     Status of unit ISSUED     Unit number [id number]     Blood component type FP 24h,Thaw     Unit division B0     Status of unit ISSUED     Unit number [id number]     Blood component type FP 24h, Thaw     Unit division 00     Status of unit ISSUED     Unit number [id number]     Blood component type FP 24h, Thaw     Unit division 00     Status of unit ISSUED     Unit number [id number]     Blood component type FP 24h, Thaw     Unit division 00     Status of unit ISSUED     Unit number [id number]     Blood component type FP 24h, Thaw     Unit division 00     Status of unit ALLOCATED    HGB & HCT    Collection Time: 09/07/20 11:32 AM   Result Value Ref Range    HGB 7.1 (L) 13.6 - 17.2 g/dL    HCT 21.4 (L) 41.1 - 50.3 %   GLUCOSE, POC    Collection Time: 09/07/20 12:20 PM   Result Value Ref Range    Glucose (POC) 184 (H) 65 - 100 mg/dL   CRYOPRECIPITATE, ALLOCATE    Collection Time: 09/07/20  1:30 PM   Result Value Ref Range    Unit number [id number]     Blood component type CRYO,5U Thaw     Unit division 00     Status of unit ISSUED     Unit number [id number]     Blood component type CRYO,5U Thaw     Unit division 00     Status of unit ISSUED     Unit number [id number]     Blood component type CRYO,5U Thaw     Unit division 00     Status of unit ISSUED PLATELETS, ALLOCATE    Collection Time: 09/07/20  2:05 PM   Result Value Ref Range    Unit number [id number]     Blood component type PLTPH LR 3     Unit division 00     Status of unit ISSUED     Unit number [id number]     Blood component type PLTPH LR,1     Unit division 00     Status of unit ALLOCATED    HGB & HCT    Collection Time: 09/07/20  2:35 PM   Result Value Ref Range    HGB 8.2 (L) 13.6 - 17.2 g/dL    HCT 24.7 (L) 41.1 - 50.3 %   FIBRINOGEN    Collection Time: 09/07/20  2:35 PM   Result Value Ref Range    Fibrinogen 267 190 - 501 mg/dL   POC CG8I    Collection Time: 09/07/20  2:35 PM   Result Value Ref Range    pH (POC) 7.31 (L) 7.35 - 7.45      pCO2 (POC) 41.2 35 - 45 MMHG    pO2 (POC) 338 (H) 75 - 100 MMHG    HCO3 (POC) 20.6 (L) 22 - 26 MMOL/L    sO2 (POC) 100 (H) 95 - 98 %    Base deficit (POC) 5 mmol/L    Sodium,  136 - 145 MMOL/L    Potassium, POC 4.3 3.5 - 5.1 MMOL/L    Glucose,  (H) 65 - 100 MG/DL    Calcium, ionized (POC) 1.00 (L) 1.12 - 1.32 mmol/L   POC CG8I    Collection Time: 09/07/20  3:31 PM   Result Value Ref Range    pH (POC) 7.30 (L) 7.35 - 7.45      pCO2 (POC) 50.0 (H) 35 - 45 MMHG    pO2 (POC) 375 (H) 75 - 100 MMHG    HCO3 (POC) 24.3 22 - 26 MMOL/L    sO2 (POC) 100 (H) 95 - 98 %    Base deficit (POC) 2 mmol/L    Sodium,  136 - 145 MMOL/L    Potassium, POC 4.4 3.5 - 5.1 MMOL/L    Glucose,  (H) 65 - 100 MG/DL    Calcium, ionized (POC) 1.29 1.12 - 1.32 mmol/L       Imaging:      ASSESSMENT:  Problem List  Date Reviewed: 8/31/2020          Codes Class Noted    Pancreatitis ICD-10-CM: K85.90  ICD-9-CM: 577.0  9/7/2020        Hematemesis ICD-10-CM: K92.0  ICD-9-CM: 578.0  9/2/2020        * (Principal) Acute blood loss anemia ICD-10-CM: D62  ICD-9-CM: 285.1  9/2/2020        Pancreatic cancer ([facility] ICD-10-CM: C25.9  ICD-9-CM: 157.9  9/2/2020        Hypotension ICD-10-CM: I95.9  ICD-9-CM: 458.9  9/2/2020        Duodenal mass ICD-10-CM: K31.89  ICD-9-CM: 537.89  9/2/2020        Iron deficiency anemia ICD-10-CM: D50.9  ICD-9-CM: 280.9  9/1/2020        Malignant neoplasm of head of pancreas ([facility] ICD-10-CM: C25.0  ICD-9-CM: 157.0  8/31/2020        Duodenal ulcer ICD-10-CM: K26.9  ICD-9-CM: 532.90  8/12/2020        Dietz's esophagus without dysplasia ICD-10-CM: K22.70  ICD-9-CM: 530.85  8/12/2020        Impaired fasting glucose ICD-10-CM: R73.01  ICD-9-CM: 790.21  5/8/2018        Essential hypertension ICD-10-CM: I10  ICD-9-CM: 401.9  5/8/2018        Mixed hyperlipidemia ICD-10-CM: E78.2  ICD-9-CM: 272.2  5/8/2018        PAC (premature atrial contraction) ICD-10-CM: I49.1  ICD-9-CM: 427.61  5/8/2018        Overweight (BMI 25.0-29. 9) ICD-10-CM: [id number]  ICD-9-CM: Anjana Horsfall  5/8/2018            60 male, recent diagnosis of pancreatic mass with duodenal ulcerated lesion (involving more than 3/4 of circumference) and hepatic metastasis, felt to likely represent metastatic pancreatic cancer, recently hospitalized with hematemesis from 9/2/20-9/6/20. He had required IR directed angiography with embolization of likely pancreatic mass that had eroded into duodenum. He had also received infed during admission. He was felt clinically stable for discharge, however now readmitted w BPR. On evaluation today, pt w rapid reponse being called as had multiple large blood bowel movements as well as single hematemesis, currently hypotensive. He is s/p 3 unit PRBC, w/ plans for FFP. Fibrinogen stable. Case d/w hospitalist as well as surgery and rad-onc. Case also discussed w wife at bedside. Given hemorrhagic appearing nature of bleeding w hemodynamic instability, pt will likely need surgical intervention to help stop bleeding, w subsequent ICU monitoring. No current inpatient role for systemic chemotherapy. Aggressive blood transfusion support as needed. Guarded prognosis. We will follow along. Lab studies and imaging studies (CT) were personally reviewed.   Pertinent old records were reviewed. Thank you for allowing us to participate in the care of Mr. Nance.       Robyn Segovia MD  [address] [place] [address] Hematology Oncology  [address] [place] [address]  Office : [phone]  Fax : [phone]

## 2020-09-07 NOTE — PROGRESS NOTES
TRANSFER - IN REPORT:    Verbal report received from Cari Stoner on [employer]  being received from OR for routine post - op      Report consisted of patients Situation, Background, Assessment and   Recommendations(SBAR). Information from the following report(s) SBAR, Kardex, OR Summary, Procedure Summary, Intake/Output, MAR, Recent Results, Med Rec Status and Cardiac Rhythm NSR was reviewed with the receiving nurse. Opportunity for questions and clarification was provided. Assessment completed upon patients arrival to unit and care assumed.

## 2020-09-07 NOTE — ACP (ADVANCE CARE PLANNING)
Mr Jon Echavarria was alert and oriented. He verbalized understanding that his blood loss is serious. He stated that his goal is to stop the blood loss so that he can meet with Dr Adina Schmitz on 9/11/20 and start treatments for his pancreatic cancer. He said that he is wife is his health care surrogate, and that the rest of the family supports his wishes for aggressive treatment, both for his blood loss and for his cancer.

## 2020-09-07 NOTE — PROGRESS NOTES
Problem: Falls - Risk of  Goal: *Absence of Falls  Description: Document Annie Patches Fall Risk and appropriate interventions in the flowsheet.   Outcome: Progressing Towards Goal  Note: Fall Risk Interventions:            Medication Interventions: Teach patient to arise slowly    Elimination Interventions: Urinal in reach, Patient to call for help with toileting needs              Problem: Patient Education: Go to Patient Education Activity  Goal: Patient/Family Education  Outcome: Progressing Towards Goal

## 2020-09-07 NOTE — PROGRESS NOTES
Surgery    Pt reportedly decompensated with large rush of blood per rectum, drop in blood pressure to 93'I systolic and increase in HR to 120's. Currently awake and alert  3rd and 4th unit of PRBC infusing now. Discussed with patient and family.   At this point it would appear there are no other options available except exploratory surgery for attempt to control active bleeding

## 2020-09-07 NOTE — BRIEF OP NOTE
Brief Postoperative Note    Patient: Amanda Garcia  YOB: 1955  MRN: [id number]    Date of Procedure: 9/7/2020     Pre-Op Diagnosis: BLEEDING DUODENAL MASS    Post-Op Diagnosis: active arterial bleeding duodenum, mass involving first and 2nd portion of duodenum, head of pancreas, multiple liver masses in right and left lobe    Procedure(s):  LAPAROTOMY EXPLORATORY, resection of distal stomach and proximal duodenum, over sew of bleeding vessel in 2nd portion of duodenum, cholecystectomy with exploration of common bile duct, gastrojejunostomy   Drain placement x 2     Surgeon(s):  Grady Kawasaki, MD    Surgical Assistant: None    Anesthesia: General     Estimated Blood Loss (mL): 033    Complications: Bleeding    Specimens:   ID Type Source Tests Collected by Time Destination   1 : Gallbladder Preservative Gallbladder  Grady Kawasaki, MD 6/0/7375 1516 Pathology   2 : Portion of duodenum Preservative Duodenum  Grady Kawasaki, MD 9/0/6499 1600 Pathology        Implants: * No implants in log *    Drains:   Richard-Garza Drain 09/07/20 Anterior;Right Abdomen (Active)       Nasogastric Tube 09/07/20 (Active)       Findings: as above     Electronically Signed by Florian Kingsley MD on 3/6/4992 at 4:29 PM

## 2020-09-07 NOTE — PROGRESS NOTES
Progress Note    Called to bedside for Rapid Response. Pt with known pancreatic mass with duodenal inolvement, likely duodenal arterial bleed. Pt losing bed pans full of lino blood. SBP 60-80's. Wife at bedside. Spoke with gen sx and IR. Plans for emergent surgical intervention. Have transfused 7 units PRBC (emergent release) and 2 FFP with 2 more FFP and cryo 10 units ordered at time pt transferred to OR. Started on pressors with continuous IVF. Cont iv octreotide. GI/ Heme onc updated. Prognosis is very grim and family/ pt has been informed of this.

## 2020-09-07 NOTE — ED TRIAGE NOTES
Pt comes in c/o abd pain, dark blood in stool. Onset today after he was discharged from here room 329. Pt originally admitted for vomiting and pancreatic cancer. Not on Chemo or radiation yet. Wife stated he had a stomach ulcer, anemic, given 4 units of blood here. Pt denied a fever, dyspnea, chest pain. Pt got 150ml of saline with EMS and 4 mg of Zofran.

## 2020-09-07 NOTE — H&P
History and Physical    Patient: Lyndal Nyhan MRN: [id number]  SSN: [id number]    YOB: 1955  Age: 60 y.o. Sex: male      Subjective:    Cc:\" I have blood in my stools\"      Lyndal Nyhan is a 60 y.o. 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 The patient was started on protonix and octreotide gtt. He received a total of 4 PRBc blood transfusion. GI, IR and oncology consulted. He underwent Mesenteric arteriogram + coil embolization on 9/3. Indira Finer was placed and he is due to chemotherapy on . His clinical status was stable and he was ultimately discharged. Last night; he noted dark red blood in the stools; associated with abdominal cramping and burping. Upon ed arrival VS were stable. Labs: hb 6.7gr ( upon discharged hb was 8.6 gr ). Wbc 16k  Lipase > 1000  Cxr: unrevealing    FOBT: positive    Type and screen taken. Dr. Marium Farmer ordered an abdomen and pelvis CT scan. The patient will be admitted for acute blood loss anemia and IR evaluation will be ordered. ROS: as above   Social hx: former smoker  Family hx: his father  of leukemia.      Past Medical History:   Diagnosis Date    Cancer [facility])     pancreatic    GERD (gastroesophageal reflux disease)     Hernia      Past Surgical History:   Procedure Laterality Date    HX CATARACT REMOVAL Left 12/15/2017    HX HERNIA REPAIR      HX ORTHOPAEDIC Right     tibia- with hardware    IR INSERT TUNL CVC W PORT OVER 5 YEARS  2020    IR OCCL TXCATH HEMMORAGE W SI  9/3/2020      Family History   Problem Relation Age of Onset    No Known Problems Mother     Cancer Father 72         leukemia    Diabetes Sister     No Known Problems Brother     No Known Problems Sister     Cancer Sister      Social History     Tobacco Use    Smoking status: Former Smoker     Packs/day: 1.00     Years: 10.00     Pack years: 10.00     Last attempt to quit: 2006     Years since quittin.6    Smokeless tobacco: Never Used   Substance Use Topics    Alcohol use: Not Currently      Prior to Admission medications    Medication Sig Start Date End Date Taking? Authorizing Provider   traMADoL (ULTRAM-ER) 100 mg Tb24  20   Provider, Historical   prochlorperazine (Compazine) 10 mg tablet Take 1 Tab by mouth every six (6) hours as needed for Nausea. Indications: prevent nausea and vomiting from cancer chemotherapy 20   Frankie Washington MD   ondansetron hcl (Zofran) 8 mg tablet Take 1 Tab by mouth every eight (8) hours as needed for Nausea. Indications: prevent nausea and vomiting from cancer chemotherapy 20   Frankie Washington MD   lidocaine-prilocaine (EMLA) topical cream Apply to port about 45 minutes prior to access 20   Frankie Washington MD   pantoprazole (PROTONIX) 40 mg tablet Take 40 mg by mouth two (2) times a day. 20   Provider, Historical   vit B Cmplx 3-FA-Vit C-Biotin (NEPHRO VERÓNICA RX) 1- mg-mg-mcg tablet Take 1 Tab by mouth daily. Provider, Historical   cholecalciferol (VITAMIN D3) 1,000 unit tablet Take 1,000 Units by mouth daily. Provider, Historical        Allergies   Allergen Reactions    Tetanus And Diphtheria Toxoids Swelling     As child       Review of Systems:  A comprehensive review of systems was negative except for that written in the History of Present Illness. Objective:     Vitals:    20 0036   BP: 110/53   Pulse: 83   Resp: 18   Temp: 98.3 °F (36.8 °C)   SpO2: 96%   Weight: 70.3 kg (155 lb)   Height: 5' 7\" (1.702 m)        Physical Exam:  General:  Alert, cooperative, no distress, appears stated age. Eyes:  Conjunctivae/corneas clear. PERRL, EOMs intact.  Fundi benign Ears:  Normal TMs and external ear canals both ears. Nose: Nares normal. Septum midline. Mucosa normal. No drainage or sinus tenderness. Mouth/Throat: Lips, mucosa, and tongue normal. Teeth and gums normal.   Neck: Supple, symmetrical, trachea midline, no adenopathy, thyroid: no enlargment/tenderness/nodules, no carotid bruit and no JVD. Back:   Symmetric, no curvature. ROM normal. No CVA tenderness. Lungs:   Clear to auscultation bilaterally. Heart:  Regular rate and rhythm, S1, S2 normal, no murmur, click, rub or gallop. Abdomen:   Soft, non-tender. Bowel sounds normal. No masses,  No organomegaly. Extremities: Extremities normal, atraumatic, no cyanosis or edema. Pulses: 2+ and symmetric all extremities. Skin: Pale, turgor normal. No rashes or lesions   Lymph nodes: Cervical, supraclavicular, and axillary nodes normal.   Neurologic: CNII-XII intact. Normal strength, sensation and reflexes throughout. Assessment:     Active Hospital Problems    Diagnosis Date Noted    Pancreatitis 09/07/2020    Acute blood loss anemia 09/02/2020    Pancreat[facility] ([facility] [address] 09/02/2020    Duodenal mass 09/02/2020    Iron deficiency anemia 09/01/2020    Malignant neoplasm of head of pancreas ([facility] [address] 08/31/2020    Dietz's esophagus without dysplasia 08/12/2020    Mixed hyperlipidemia 05/08/2018    Essential hypertension 05/08/2018     Plan:     -Acute blood loss anemia: etiology likely recurrent bleeding from ulcerated duodenal mass. FOBT was positive in the ED.  No signs of hematemesis or hemodynamic instability  Admit as inpatient  Keep NPO  IVFs  Continue IV ppi bid and octreotide gtt  Antiemetics  Monitor abdomen and pelvis ct scan  Serial HH q 6hrs and transfuse prn  Transfuse 2 Prbc tonight  IR consult in am to evaluate embolization    -Abdominal pain, elevated lipase:  Pancreatitis  Keep npo, ivfs, conservative management  Pain control    -Adenocarcinoma of the pancreas with liver mets:  Patient has medi-hospitals. Plan to start chemotherapy on 9/11  Will follow with oncology as outpatient    -HTN: hold medications    DVT ppx: compression stockings    Code status: full    Estimated LOS > 2MN  Risk: high  Estimated DC planning: home  Goals of care discussed with the patient and his son over the phone.        Signed By: Donald Walter MD     September 7, 2020

## 2020-09-07 NOTE — ANESTHESIA PROCEDURE NOTES
Arterial Line Placement    Start time: 9/7/2020 2:13 PM  End time: 9/7/2020 2:19 PM  Performed by: Sami Henley MD  Authorized by: Sami Henley MD     Pre-Procedure  Indications:  Arterial pressure monitoring  Preanesthetic Checklist: patient identified, risks and benefits discussed, anesthesia consent, site marked, patient being monitored, timeout performed and patient being monitored    Timeout Time: 14:12        Procedure:   Prep:  ChloraPrep  Seldinger Technique?: Yes    Orientation:  Right  Location:  Radial artery  Catheter size:  22 G  Number of attempts:  3 or more  Cont Cardiac Output Sensor: No      Assessment:   Post-procedure:  Line secured and sterile dressing applied  Patient Tolerance:  Patient tolerated the procedure well with no immediate complications  Comment:   Ultrasound guidance used

## 2020-09-07 NOTE — PROGRESS NOTES
This RN made aware of patient BP 54/31. Patient c/o of nausea, diaphoresis, abd pain. Primary RN and Charge RN to bedside. Rapid response called due to hypotension and symptomatic. See MAR and flowsheets for interventions, of blood and FFP transfusions, levophed drip, NS bolus. During RR, patient had 2 large bowel movements, red in color. Vero Walker, NP and ICU RNs at bedside, implementing interventions. Patient emergently taken to OR at 1340.

## 2020-09-07 NOTE — PROGRESS NOTES
"Chief Complaint   Patient presents with     Arthritis     Kawasaki disease/arthritis consult.     Vitals:    02/27/20 0827   BP: 97/72   BP Location: Right arm   Patient Position: Chair   Pulse: 110   Resp: 24   Temp: 97.4  F (36.3  C)   TempSrc: Axillary   Weight: 30 lb 6.8 oz (13.8 kg)   Height: 3' 1.28\" (94.7 cm)      Yuli Jeter M.A.  February 27, 2020  " Patient out from operating room and placed on the ventilator on documented settings. Patient is orally intubated with a # 8.0 ET Tube secured at the 24 cm efraín at the lip. Breath sounds are diminished. Trachea is midline. Negative for subcutaneous air, chest excursion is symmetric. Negative for pitting edema. Patient is also Negative for cyanosis. All alarms are set and audible. Resuscitation bag is at the head of the bed. Ventilator Settings  Mode FIO2 Rate Tidal Volume Pressure PEEP I:E Ratio   PRVC  60 %   16 500 ml     8 cm H20  1:3.3      Peak airway pressure: 20 cm H2O   Minute ventilation: 7.8 l/min     ABG: No results for input(s): PH, PCO2, PO2, HCO3 in the last 72 hours.       Emily Lomeli, RT

## 2020-09-07 NOTE — CONSULTS
[facility] Consult Note       Primary GI Physician: Dr Reynolds Many    Referring Provider: Simon Mackey NP    Consult Date:  9/7/2020    Admit Date:  9/7/2020    Chief Complaint:  Hematemesis and hematochezia    Subjective:     History of Present Illness:  Patient is a 60 y.o. male with PMH of pancreatic cancer and GERD, who is seen in consultation at the request of Simon Mackey NP for hematemesis and hematochezia. Pt presented to ED 9/7/2020. He had maroon BM after discharge earlier in the day. He also had abdominal cramping. HGB in ED 6.7, down from 8.6 at discharge earlier in the day. Lipase >1000. He was admitted to hospitalist and 2 units of PRBCs ordered. He had outpatient follow up with oncology with plans to start chemotherapy 9/11/2020. HGB 7.5 this am at 0532. He began having hematemesis about 0723. Octreotide gtt has been started. He is receiving PPI IV BID as well. He is receiving additonal PRBCs at this time. Surgical team has been consulted and recommends surgical intervention only as life saving situation. Pt seen in consult 9/2/2020 for hematemesis and anemia. EUS 8/25/2020 showed a large ulcerated mass extending into the duodenal bulb with a stricture distal to this for which the scope could not traverse. Bx's from pancreas, duodenum, and pancreas all show adenocarcinoma. Onc feels this is most likely primary pancreatic. He had noticed black stools and had large volume hematemesis. He underwent IR embolization 9/3/2020. He was discharged 9/6/2020. PMH:  Past Medical History:   Diagnosis Date    Cancer [facility])     pancreatic    GERD (gastroesophageal reflux disease)     Hernia        PSH:  Past Surgical History:   Procedure Laterality Date    HX CATARACT REMOVAL Left 12/15/2017    HX HERNIA REPAIR  2007    HX ORTHOPAEDIC Right 1982    tibia- with hardware    IR INSERT TUNL CVC W PORT OVER 5 YEARS  9/4/2020    IR OCCL TXCATH HEMMORAGE W SI  9/3/2020       Allergies:   Allergies Allergen Reactions    Tetanus And Diphtheria Toxoids Swelling     As child       Home Medications:  Prior to Admission medications    Medication Sig Start Date End Date Taking? Authorizing Provider   traMADoL (ULTRAM-ER) 100 mg Tb24  8/20/20   Provider, Historical   prochlorperazine (Compazine) 10 mg tablet Take 1 Tab by mouth every six (6) hours as needed for Nausea. Indications: prevent nausea and vomiting from cancer chemotherapy 8/31/20   Irvin Talavera MD   ondansetron hcl (Zofran) 8 mg tablet Take 1 Tab by mouth every eight (8) hours as needed for Nausea. Indications: prevent nausea and vomiting from cancer chemotherapy 8/31/20   Irvin Talavera MD   lidocaine-prilocaine (EMLA) topical cream Apply to port about 45 minutes prior to access 8/31/20   Irvin Talavera MD   pantoprazole (PROTONIX) 40 mg tablet Take 40 mg by mouth two (2) times a day. 7/24/20   Provider, Historical   vit B Cmplx 3-FA-Vit C-Biotin (NEPHRO VERÓNICA RX) 1- mg-mg-mcg tablet Take 1 Tab by mouth daily. Provider, Historical   cholecalciferol (VITAMIN D3) 1,000 unit tablet Take 1,000 Units by mouth daily.     Provider, Historical       Hospital Medications:  Current Facility-Administered Medications   Medication Dose Route Frequency    sodium chloride 0.9 % bolus infusion 1,000 mL  1,000 mL IntraVENous ONCE    0.9% sodium chloride infusion 250 mL  250 mL IntraVENous PRN    0.9% sodium chloride infusion  75 mL/hr IntraVENous CONTINUOUS    ondansetron (ZOFRAN) injection 4 mg  4 mg IntraVENous Q6H PRN    promethazine (PHENERGAN) with saline injection 12.5 mg  12.5 mg IntraVENous Q6H PRN    pantoprazole (PROTONIX) 40 mg in 0.9% sodium chloride 10 mL injection  40 mg IntraVENous Q12H    octreotide (SANDOSTATIN) 500 mcg in 0.9% sodium chloride 500 mL infusion  50 mcg/hr IntraVENous CONTINUOUS    acetaminophen (TYLENOL) suppository 650 mg  650 mg Rectal Q4H PRN    diphenhydrAMINE (BENADRYL) capsule 25 mg  25 mg Oral Q6H PRN  0.9% sodium chloride infusion 250 mL  250 mL IntraVENous PRN    HYDROmorphone (PF) (DILAUDID) injection 0.5 mg  0.5 mg IntraVENous Q4H PRN       Social History:  Social History     Tobacco Use    Smoking status: Former Smoker     Packs/day: 1.00     Years: 10.00     Pack years: 10.00     Last attempt to quit: 2006     Years since quittin.6    Smokeless tobacco: Never Used   Substance Use Topics    Alcohol use: Not Currently       Pt denies any history of drug use, blood transfusions, or tattoos. Family History:  Family History   Problem Relation Age of Onset    No Known Problems Mother     Cancer Father 72         leukemia    Diabetes Sister     No Known Problems Brother     No Known Problems Sister     Cancer Sister        Review of Systems:  A detailed 10 system ROS is obtained, with pertinent positives as listed above. All others are negative. Diet:  NPO    Objective:     Physical Exam:  Vitals:  Visit Vitals  BP 97/57   Pulse 75   Temp 98.4 °F (36.9 °C)   Resp 17   Ht 5' 7\" (1.702 m)   Wt 70.3 kg (155 lb)   SpO2 95%   BMI 24.28 kg/m²     Gen:  Pt is alert, cooperative, no acute distress  Skin:  Extremities and face reveal no rashes. HEENT: Sclerae anicteric. Extra-occular muscles are intact. No oral ulcers. No abnormal pigmentation of the lips. The neck is supple. Cardiovascular: Regular rate and rhythm. No murmurs, gallops, or rubs. Respiratory:  Comfortable breathing with no accessory muscle use. Clear breath sounds anteriorly with no wheezes, rales, or rhonchi. GI:  Abdomen nondistended, soft, and nontender. Normal active bowel sounds. No enlargement of the liver or spleen. No masses palpable. Rectal:  Deferred  Musculoskeletal:  No pitting edema of the lower legs. Neurological:  Gross memory appears intact. Patient is alert and oriented. Psychiatric:  Mood appears appropriate with judgement intact.   Lymphatic:  No cervical or supraclavicular adenopathy. Laboratory:    Recent Labs     09/07/20  0532 09/07/20  0153 09/07/20  0046 09/06/20  0528 09/05/20 2055 09/05/20  1304 09/05/20  0447 09/04/20 2045 09/04/20  1352   WBC  --   --  16.6* 13.2*  --   --  13.3*  --   --    HGB 7.5*  --  6.7* 8.6* 7.6* 7.8* 8.1* 8.1* 7.6*   HCT 23.3*  --  21.6* 27.0* 23.7* 24.5* 25.4* 24.8* 24.1*   PLT  --   --  185 172  --   --  167  --   --    MCV  --   --  92.3 89.4  --   --  90.4  --   --    NA  --   --  140 140  --   --  139  --   --    K  --   --  3.9 3.3*  --   --  3.5  --   --    CL  --   --  110* 108*  --   --  107  --   --    CO2  --   --  23 26  --   --  25  --   --    BUN  --   --  16 10  --   --  14  --   --    CREA  --   --  0.92 0.93  --   --  0.99  --   --    CA  --   --  7.7* 8.4  --   --  7.7*  --   --    GLU  --   --  170* 111*  --   --  129*  --   --    AP  --   --  96  --   --   --   --   --   --    ALT  --   --  20  --   --   --   --   --   --    TBILI  --   --  0.5  --   --   --   --   --   --    ALB  --   --  1.8*  --   --   --   --   --   --    TP  --   --  4.6*  --   --   --   --   --   --    LPSE  --   --  1,204*  --   --   --   --   --   --    PTP  --  20.3*  --   --   --   --   --   --   --    INR  --  1.7  --   --   --   --   --   --   --    APTT  --  33.7  --   --   --   --   --   --   --       EUS 8/25/2020    FINDINGS:  ESOPHAGUS: Not well visualized  STOMACH: Limited views with the oblique-viewing echoendoscope were unremarkable  DUODENUM: There is a friable ulcerated mass in the distal bulb. This involves more than 3/4 of the circumference of the duodenum. It creates is tight stricture, and the large caliber EUS scope is not able to pass through. The appearance suggests malignancy, primary duodenal versus extension of a pancreatic head cancer.  Multiple biopsies obtained with cold forceps.   PANCREAS: The pancreas was well visualized In the body and tail, but only limited views of the head of the pancreas are available from the gastric antrum and pyloric channel. The scope cannot be advanced further into the duodenum because of the stricture.   The ampulla Cannot be visualized. There is a heterogeneous hypoechoic mass with irregular outer margins involving the head of the pancreas. It measures 41 mm in cross section. There is a prominent central prefer echoic area. It is inseparable from the duodenal wall. It does not directly involve the portal vein or celiac axis. The SMA cannot be fully visualized. FNA was performed from a transgastric approach at the pylorus. 2 passes were made and a large amount of tissue was obtained using a [facility] acquire 22-gauge needle. Sent in formalin.   BILIARY SYSTEM:  The gallbladder is normal. Common bile duct is not well visualized from the stomach. OTHER ORGANS: There was no ascites in the upper abdomen.  Limited views of the left lobe of the liver demonstrated Multiple prominent solid mass lesions, Consistent with metastases. There is also a large simple cyst near the central portion of the liver. The solid metastases were targeted for FNA using a separate acquire 22-gauge needle. The largest mass visible was 16 mm, round, hypoechoic with a slightly hyperechoic center, typical of metastatic disease. Tissue was sent in formalin.  The celiac axis appears normal.  There was significant portal and lai-pancreatic lymphadenopathy. A lymph node adjacent to the head of the pancreas measures 18 x 16 mm. It is oval, homogeneous, with well defined margins.  The appearance is atypical for metastatic involvement.     Assessment:     Principal Problem:    Acute blood loss anemia (9/2/2020)    Active Problems:    Essential hypertension (5/8/2018)      Mixed hyperlipidemia (5/8/2018)      Dietz's esophagus without dysplasia (8/12/2020)      Malignant neoplasm of head of pancreas ([address] (8/31/2020)      Iron deficiency anemia (9/1/2020)      Pancreatic cancer ([address] (9/2/2020)      Duodenal mass (9/2/2020) Pancreatitis (9/7/2020)    59yo male with newly dx'd metastatic pancreatic cancer (likely) complicated by large penetrating ulcerated mass in the duodenum, presenting with hematemesis, melena, and ABLA concerning for UGIB 2/2 the ulcerated mass. Underwent IR embolization 9/3/2020 for GIB. Was d/c 9/6/2020, only to return to ED with hematochezia. He developed hematemesis this am. HGB 7.5 s/p 2 units PRBC since admission. He is receiving additional PRBC at this time. He has not had any further hematochezia or hematemesis in a few hours. He is receiving PPI IV BID and octreotide. No plans for surgical intervention at this time. Plan:     -Monitor for GIB  -Monitor HGB and transfuse as needed  -On PPI and octreotide  -Unlikely that endoscopic procedures would be beneficial in this case  -Consults for IR, palliative care, oncology, and radiation oncology all pending. IVANNA Villa     Patient is seen and examined in collaboration with Dr. Lilly Thornton. Assessment and plan as per Dr. Lilly Thornton.

## 2020-09-07 NOTE — PROGRESS NOTES
called by the patient's nurse to come and give support to the patient and his family. The nurse said that he was bleeding out. When the [address] arrived at the patient's room, it was learned that the patient was now undergoing emergency surgery to stop the internal bleeding. The  prayed with the patient's wife, their son and grandson. The  provided emotional support and a spiritual presence to the family for an extended period. The family is of the Jehovah's witness venkat and they asked for prayer from the [employer].       AGUEDA Germain

## 2020-09-07 NOTE — PROGRESS NOTES
09/07/20 0506   Dual Skin Pressure Injury Assessment   Dual Skin Pressure Injury Assessment WDL   Second Care Provider (Based on [address] [place] [address]) Marlene Carrillo RN   Skin Integumentary   Skin Integumentary (WDL) WDL    Pressure  Injury Documentation No Pressure Injury Noted-Pressure Ulcer Prevention Initiated   Wound Prevention and Protection Methods   Orientation of Wound Prevention Posterior   Location of Wound Prevention Sacrum/Coccyx   Dressing Present  No   Wound Offloading (Prevention Methods) Bed, pressure redistribution/air;Bed, pressure reduction mattress

## 2020-09-07 NOTE — CONSULTS
Palliative Care    Patient: Sandie Hawthorne MRN: [id number]  SSN: [id number]    YOB: 1955  Age: 60 y.o. Sex: male       Date of Request: 9/7/2020  Date of Consult:  9/7/2020  Reason for Consult:  end stage disease  Requesting Physician: Vero Walker NP     Assessment/Plan:     Principal Diagnosis:    Failure to Thrive  R62.7    Additional Diagnoses:   · Advance Care Planning Counseling Z71.89  · Pain, abdomen  R10.9  · Counseling, Encounter for Medical Advice  Z71.9  · Encounter for Palliative Care  Z51.5    Palliative Performance Scale (PPS):  PPS: 10    Medical Decision Making:   Reviewed and summarized chart from admission to present. Discussed case with appropriate providers: Hospitalist NP Vero Walker, GIOVANNI Andrade. Reviewed laboratory and x-ray data. Pt resting in bed, no visitors present when seen this morning  Mr Ravinder Tatum endorsed intermittent abdominal pain. Hypotension has limited the IV Dilaudid that GIOVANNI Lutz can give. Mr Ravinder Tatum was alert and oriented. He verbalized understanding that his blood loss is serious. He stated that his goal is to stop the blood loss so that he can meet with Dr Sari Oswald on 9/11/20 and start treatments for his pancreatic cancer. He said that he is wife is his health care surrogate, and that the rest of the family supports his wishes for aggressive treatment. Will continue to follow. Will discuss findings with members of the interdisciplinary team.      Thank you for this referral.          .    Subjective:     History obtained from:  Patient, Care Provider and Chart    Chief Complaint: Blood loss  History of Present Illness:  Mr. Ravinder Tatum is a 61 yo male with recent diagnosis of pancreatic mass with duodenal involvement, GERD, and other history as listed below. He was hospitalized at [facility] for duodenal bleed; discharged home on 9/6. He returned to [facility] ER on 9/7 with reports of dark blood in stool. Labs revealed a hgb of 6.7.   He was admitted for further management of acute blood loss anemia. Advance Directive: No      Code Status:  Full Code            Health Care Power of : No.  Wife is surrogate. Past Medical History:   Diagnosis Date    Cancer [facility])     pancreatic    GERD (gastroesophageal reflux disease)     Hernia       Past Surgical History:   Procedure Laterality Date    HX CATARACT REMOVAL Left 12/15/2017    HX HERNIA REPAIR      HX ORTHOPAEDIC Right     tibia- with hardware    IR INSERT TUNL CVC W PORT OVER 5 YEARS  2020    IR OCCL TXCATH HEMMORAGE W SI  9/3/2020     Family History   Problem Relation Age of Onset    No Known Problems Mother     Cancer Father 72         leukemia    Diabetes Sister     No Known Problems Brother     No Known Problems Sister     Cancer Sister       Social History     Tobacco Use    Smoking status: Former Smoker     Packs/day: 1.00     Years: 10.00     Pack years: 10.00     Last attempt to quit: 2006     Years since quittin.6    Smokeless tobacco: Never Used   Substance Use Topics    Alcohol use: Not Currently     Prior to Admission medications    Medication Sig Start Date End Date Taking? Authorizing Provider   traMADoL (ULTRAM-ER) 100 mg Tb24  20   Provider, Historical   prochlorperazine (Compazine) 10 mg tablet Take 1 Tab by mouth every six (6) hours as needed for Nausea. Indications: prevent nausea and vomiting from cancer chemotherapy 20   Silverio Espinoza MD   ondansetron hcl (Zofran) 8 mg tablet Take 1 Tab by mouth every eight (8) hours as needed for Nausea. Indications: prevent nausea and vomiting from cancer chemotherapy 20   Silverio Espinoza MD   lidocaine-prilocaine (EMLA) topical cream Apply to port about 45 minutes prior to access 20   Silverio Espinoza MD   pantoprazole (PROTONIX) 40 mg tablet Take 40 mg by mouth two (2) times a day.  20   Provider, Historical   vit B Cmplx 3-FA-Vit C-Biotin (NEPHRO VERÓNICA RX) 1- mg-mg-mcg tablet Take 1 Tab by mouth daily. Provider, Historical   cholecalciferol (VITAMIN D3) 1,000 unit tablet Take 1,000 Units by mouth daily. Provider, Historical       Allergies   Allergen Reactions    Tetanus And Diphtheria Toxoids Swelling     As child        Review of Systems:  A comprehensive review of systems was negative except for: Gastrointestinal: Positive for blood loss via emesis and stool. Objective:     Visit Vitals  /55   Pulse (!) 110   Temp 98.4 °F (36.9 °C)   Resp 18   Ht 5' 7\" (1.702 m)   Wt 155 lb (70.3 kg)   SpO2 100%   BMI 24.28 kg/m²        Physical Exam:    General:  Cooperative. No acute distress. Eyes:  Conjunctivae/corneas clear    Nose: Nares normal. Septum midline. Neck: Supple, symmetrical, trachea midline, no JVD   Lungs:   Clear to auscultation bilaterally, unlabored   Heart:  Regular rate and rhythm, no murmur    Abdomen:   Tender, distended   Extremities: Normal, atraumatic, no cyanosis or edema   Skin: Skin color, texture, turgor normal. No rash or lesions.    Neurologic: Nonfocal   Psych: Alert and oriented      Assessment:     Hospital Problems  Date Reviewed: 8/31/2020          Codes Class Noted POA    Pancreatitis ICD-10-CM: K85.90  ICD-9-CM: 774.9  9/7/2020 Yes        * (Principal) Acute blood loss anemia ICD-10-CM: D62  ICD-9-CM: 285.1  9/2/2020 Unknown        Pancreatic cancer ([facility] ICD-10-CM: C25.9  ICD-9-CM: 157.9  9/2/2020 Yes        Duodenal mass ICD-10-CM: K31.89  ICD-9-CM: 537.89  9/2/2020 Yes        Iron deficiency anemia ICD-10-CM: D50.9  ICD-9-CM: 280.9  9/1/2020 Yes        Malignant neoplasm of head of pancreas ([facility] ICD-10-CM: C25.0  ICD-9-CM: 157.0  8/31/2020 Yes        Dietz's esophagus without dysplasia ICD-10-CM: K22.70  ICD-9-CM: 530.85  8/12/2020 Yes        Essential hypertension ICD-10-CM: I10  ICD-9-CM: 401.9  5/8/2018 Yes        Mixed hyperlipidemia ICD-10-CM: E78.2  ICD-9-CM: 272.2  5/8/2018 Yes              Signed By: Brenda Horn, ALINA     September 7, 2020

## 2020-09-07 NOTE — CONSULTS
[address]  [phone]     History and Physical/Surgical Consult   Galen Arias    Admit date: 2020    MRN: [id number]     : 1955     Age: 60 y.o.          2020 9:49 AM    Subjective/HPI:   This patient is a 60 y.o. seen and evaluated at the request of admitting service due to blood loss from a mass in duodenum. Pt came to hospital via EMS yesterday after an episode of dizziness and feeling diaphoretic at home. Pt reports what sounds like some melena and small  Amount emesis but no lino hematemesis. Currently he feels ok. He denies any pain at present. Discussed with Dr Pedro Burr who performed his last EGD and EUS. The mass is described as being in duodenum just past the bulb and is necrotic, ulcerated in nature and filling approximately 3/4 of the lumen. Previous attempt at embolization seems to have been temporarily successful. Chemotherapy and radiation have not been started as of yet. Review of Systems  A comprehensive review of systems was negative.   Past Medical History:   Diagnosis Date    Cancer [facility])     pancreatic    GERD (gastroesophageal reflux disease)     Hernia       Past Surgical History:   Procedure Laterality Date    HX CATARACT REMOVAL Left 12/15/2017    HX HERNIA REPAIR      HX ORTHOPAEDIC Right     tibia- with hardware    IR INSERT TUNL CVC W PORT OVER 5 YEARS  2020    IR OCCL TXCATH HEMMORAGE W SI  9/3/2020      Allergies   Allergen Reactions    Tetanus And Diphtheria Toxoids Swelling     As child      Social History     Tobacco Use    Smoking status: Former Smoker     Packs/day: 1.00     Years: 10.00     Pack years: 10.00     Last attempt to quit: 2006     Years since quittin.6    Smokeless tobacco: Never Used   Substance Use Topics    Alcohol use: Not Currently      Social History     Social History Narrative    Not on file     Family History Problem Relation Age of Onset    No Known Problems Mother     Cancer Father 72         leukemia    Diabetes Sister     No Known Problems Brother     No Known Problems Sister     Cancer Sister       Prior to Admission Medications   Prescriptions Last Dose Informant Patient Reported? Taking? cholecalciferol (VITAMIN D3) 1,000 unit tablet   Yes No   Sig: Take 1,000 Units by mouth daily. lidocaine-prilocaine (EMLA) topical cream   No No   Sig: Apply to port about 45 minutes prior to access   ondansetron hcl (Zofran) 8 mg tablet   No No   Sig: Take 1 Tab by mouth every eight (8) hours as needed for Nausea. Indications: prevent nausea and vomiting from cancer chemotherapy   pantoprazole (PROTONIX) 40 mg tablet   Yes No   Sig: Take 40 mg by mouth two (2) times a day. prochlorperazine (Compazine) 10 mg tablet   No No   Sig: Take 1 Tab by mouth every six (6) hours as needed for Nausea. Indications: prevent nausea and vomiting from cancer chemotherapy   traMADoL (ULTRAM-ER) 100 mg Tb24   Yes No   vit B Cmplx 3-FA-Vit C-Biotin (NEPHRO VERÓNICA RX) 1- mg-mg-mcg tablet   Yes No   Sig: Take 1 Tab by mouth daily.       Facility-Administered Medications: None     Current Facility-Administered Medications   Medication Dose Route Frequency    sodium chloride 0.9 % bolus infusion 1,000 mL  1,000 mL IntraVENous ONCE    0.9% sodium chloride infusion 250 mL  250 mL IntraVENous PRN    0.9% sodium chloride infusion  75 mL/hr IntraVENous CONTINUOUS    ondansetron (ZOFRAN) injection 4 mg  4 mg IntraVENous Q6H PRN    promethazine (PHENERGAN) with saline injection 12.5 mg  12.5 mg IntraVENous Q6H PRN    pantoprazole (PROTONIX) 40 mg in 0.9% sodium chloride 10 mL injection  40 mg IntraVENous Q12H    octreotide (SANDOSTATIN) 500 mcg in 0.9% sodium chloride 500 mL infusion  50 mcg/hr IntraVENous CONTINUOUS    acetaminophen (TYLENOL) suppository 650 mg  650 mg Rectal Q4H PRN    diphenhydrAMINE (BENADRYL) capsule 25 mg 25 mg Oral Q6H PRN    0.9% sodium chloride infusion 250 mL  250 mL IntraVENous PRN    HYDROmorphone (PF) (DILAUDID) injection 0.5 mg  0.5 mg IntraVENous Q4H PRN     Objective:     Vitals:    09/07/20 0751 09/07/20 0807 09/07/20 0843 09/07/20 0936   BP: 91/51 94/53 97/57 95/52   Pulse:   75 85   Resp:   17 18   Temp:   98.4 °F (36.9 °C) 98.6 °F (37 °C)   SpO2:   95% 98%   Weight:       Height:         No intake/output data recorded. 09/05 1901 - 09/07 0700  In: 146.3   Out: -   Physical Exam:   Gen- the patient is well developed and in no acute distress  HEENT- PERRL, EOMI, no scleral icterus       nose without alar flaring or epistaxis                  oral muscosa moist without cyanosis  Neck- no JVD or retractions  Lungs- resp even/unlab   Heart- RRR   Abd- soft with no tenderness, no palpable masses    Ext- warm without cyanosis. There is no lower leg edema. Skin- no jaundice or rashes  Neuro- alert and oriented x 3. No gross sensorimotor deficits are present. CT Results (most recent):  Results from Hospital Encounter encounter on 09/07/20   CT ABD PELV W CONT    Narrative EXAM: CT abdomen and pelvis with IV contrast.    INDICATION: Abdominal pain. Pancreatic cancer. COMPARISON: Prior CT abdomen and pelvis on August 17, 2020. TECHNIQUE: Axial CT images of the abdomen and pelvis were obtained after the  intravenous injection of 100 mL Isovue 370 CT contrast. Radiation dose reduction  techniques were used for this study. Our CT scanners use one or all of the  following:  Automated exposure control, adjustment of the mA or kV according to  patient size, iterative reconstruction. FINDINGS:    - Liver:  Although a 4.8 cm cyst in the liver is unchanged, other numerous  smaller hypodense masses throughout both liver lobes have probably progressed in  size and number.  - Gallbladder and bile ducts: New density in the gallbladder lumen likely  relates to excreted contrast. There is no evidence of acute cholecystitis or  biliary tract dilatation. The portal vein is patent. - Spleen: Within normal limits. - Urinary tract: Again noted are left parapelvic kidney cyst. A few tiny right  kidney cysts are unchanged. The urinary bladder is within normal limits. - Adrenals: Within normal limits. - Pancreas: No significant change in the previously described approximate 4 cm  pancreatic head mass. No acute peripancreatic inflammation or ductal dilatation  is seen. There are new clips or fiducial markers adjacent to the pancreatic  head. - Gastrointestinal tract: Within normal limits. - Retroperitoneum: Mild to moderate abdominal aorta atherosclerosis. - Peritoneal cavity and abdominal wall: No ascites or free intraperitoneal air.  - Pelvis: Within normal limits. - Spine/bones: No acute process. - Other comments: None. Impression IMPRESSION:   1. Stable pancreatic head mass. 2. Progressed widespread liver metastases. Data Review   Recent Labs     09/07/20  0532 09/07/20  0046 09/06/20 0528 09/05/20 0447   WBC  --  16.6* 13.2*  --  13.3*   HGB 7.5* 6.7* 8.6*   < > 8.1*   HCT 23.3* 21.6* 27.0*   < > 25.4*   PLT  --  185 172  --  167    < > = values in this interval not displayed.      Recent Labs     09/07/20  0153 09/07/20  0046 09/06/20 0528 09/05/20  0447   NA  --  140 140 139   K  --  3.9 3.3* 3.5   CL  --  110* 108* 107   CO2  --  23 26 25   GLU  --  170* 111* 129*   BUN  --  16 10 14   CREA  --  0.92 0.93 0.99   INR 1.7  --   --   --        Assessment:     Hospital Problems  Date Reviewed: 8/31/2020          Codes Class Noted POA    Pancreatitis ICD-10-CM: K85.90  ICD-9-CM: 589.2  9/7/2020 Yes        * (Principal) Acute blood loss anemia ICD-10-CM: D62  ICD-9-CM: 285.1  9/2/2020 Unknown        Pancreatic cancer ([facility] ICD-10-CM: C25.9  ICD-9-CM: 157.9  9/2/2020 Yes        Duodenal mass ICD-10-CM: K31.89  ICD-9-CM: 537.89  9/2/2020 Yes        Iron deficiency anemia ICD-10-CM: D50.9  ICD-9-CM: 280.9 9/1/2020 Yes        Malignant neoplasm of head of pancreas ([facility] ICD-10-CM: C25.0  ICD-9-CM: 157.0  8/31/2020 Yes        Dietz's esophagus without dysplasia ICD-10-CM: K22.70  ICD-9-CM: 530.85  8/12/2020 Yes        Essential hypertension ICD-10-CM: I10  ICD-9-CM: 401.9  5/8/2018 Yes        Mixed hyperlipidemia ICD-10-CM: E78.2  ICD-9-CM: 272.2  5/8/2018 Yes            Plan:     Pt currently hemodynamically stable and asymptomatic with transfusion at this time. Vitals stable     Surgery in this scenerio would be only in life saving situation due to hemodynamic instability. This would be very high risk with substantial risk of mortality. Recommend continued fluids and blood products as needed. Would use FFP if multiple units of PRBC are given. Hold all blood thinners. Keep patient NPO.         Kamron Vega MD

## 2020-09-07 NOTE — ANESTHESIA PREPROCEDURE EVALUATION
Anesthetic History   No history of anesthetic complications            Review of Systems / Medical History  Pertinent labs reviewed    Pulmonary              Pertinent negatives: No smoker (quit 2006)     Neuro/Psych              Cardiovascular    Hypertension                Comments: occ PACs   GI/Hepatic/Renal     GERD          Comments: Large volume bright red emesis Endo/Other        Cancer (Adenocarcinoma to the head of the pancreas with liver mets)     Other Findings              Physical Exam    Airway  Mallampati: I  TM Distance: 4 - 6 cm  Neck ROM: normal range of motion   Mouth opening: Normal     Cardiovascular    Rhythm: regular  Rate: abnormal        Comments: tachycardic Dental    Dentition: Poor dentition     Pulmonary  Breath sounds clear to auscultation               Abdominal         Other Findings            Anesthetic Plan    ASA: 5, emergent  Anesthesia type: general    Monitoring Plan: Arterial line and CVP    Post procedure ventilation   Induction: Intravenous and RSI  Anesthetic plan and risks discussed with: Patient and Spouse      Patient with adenocarcinoma of head of pancreas with liver mets. Is s/p EUS on 8/25 that showed large ulcerated duodenal mass. On 9/3 had mesenteric arteriogram w/ coil emoblization by IR and received 2 units pRBCs, then another unit on 9/5. Per report from bedside RNs, today he passed large volume of blood per rectum and also began vomiting large quantities of blood and became hemodynamically unstable. HR in the 120s and BP in the 60s. He was immediately transfused. Per report, he received 9 units of pRBCs and 4 units of FFP prior to arrival in OR.

## 2020-09-07 NOTE — PROGRESS NOTES
Hospitalist Progress Note     Admit Date:  2020 12:31 AM   Name:  Bob Moses   Age:  60 y.o.  :  1955   MRN:  [id number]   PCP:  Elías Ramesh MD  Treatment Team: Attending Provider: Cindi Ferreira MD; Charge Nurse: Angel Perez Consulting Provider: Onur Gomez MD; Consulting Provider: Angelito Haywood MD; Consulting Provider: Mike Vazquez NP; Consulting Provider: Cindi Ferreira MD; Utilization Review: Stephanie Hu    Subjective:   HPI and or CC:  60year old / male admitted after midnight. 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 He was started on IV Protonix and IV Ocetreotide with GI, Onc, and IR consulted during last admission. On 9/3 he underwent mesenteric arteriogram w/ coil emoblization and was felt to be hemodynamically stable so discharged . Oncology notes from  felt palliative chemo and radiation would potentially benefit the patient and the patient has an appointment  with Dr. Po Nathan with plans to begin treatment at that time. The patient returned to the ED last night when he noticed blood in his stool with abdominal pain and burping. In the ED hemoglobin noted 6.7-noted 8.6 at discharge yesterday. Lipase 1204, WBC 16.6. He received 2 units PRBC when 1st admitted this am.  At 7:20 am I received a message from nursing that he was now having hematemesis with blood pressure 93/52, HR 78. Upon my exam patient appears weak, bright red emesis approximately 50 ml noted in cup. Messages sent to Dr. Krissy Neumann and Dr. Martir Mclean. Dr. Martir Mclean stated IR had embolized the areas and they could offer no further intervention. Dr. Krissy Neumann suggested consulting surgery to see if palliative surgery would be a possibility for the patient.  Gi was also consulted. Patient seen earlier in the am by surgery. Patient felt to be hemodynamically stable so would wait on surgery until tomorrow unless patient became unstable. Per GI note, endoscopic therapy would only be temporary measure and they would wait for oncology input. Patient remained with blood pressure 33P systolic until noon at which time he had another large, bloody stool per nursing. Blood pressure noted to drop 60 systolic and Rapid Response called. IR, GI, oncology, and surgery all notified again of patient decline. Patient seen by Dr. Ileana Colvin who also spoke with spouse at bedside. Dr. Wilver Wilkins also saw patient. During this time patient noted with extensively large stool, lino blood that filled bed pan 2/3 full with large clots noted. This occurred twice during rapid response. He received NS bolus, Levaphed was started, and he received a total of 8 units PRBC, 4 units FFP. Blood pressure 86U-JCK 441S systolic with HR 13D. Patient remained alert and oriented x3. Spouse remained at bedside with updates provided. Patient eventually transported to OR emergently with plans for exploratory surgery by general surgery.     Objective:     Patient Vitals for the past 24 hrs:   Temp Pulse Resp BP SpO2   09/07/20 1335  (!) 110  106/55 100 %   09/07/20 1331  93      09/07/20 1327  100   98 %   09/07/20 1325  95   100 %   09/07/20 1323  (!) 110  106/55 100 %   09/07/20 1321  97   100 %   09/07/20 1320  93  (!) 83/45 94 %   09/07/20 1316  (!) 102  (!) 79/51    09/07/20 1312  (!) 106  92/53    09/07/20 1303  95 18 97/51 97 %   09/07/20 1257  95  (!) 89/47 100 %   09/07/20 1243  (!) 118  (!) 80/45 100 %   09/07/20 1242  (!) 128  (!) 66/42 100 %   09/07/20 1236    (!) 85/36    09/07/20 1232  (!) 109      09/07/20 1230  (!) 101  103/43    09/07/20 1221  (!) 110  (!) 67/32    09/07/20 1218 98.4 °F (36.9 °C) (!) 110 19 (!) 54/31 96 %   09/07/20 1032 98.4 °F (36.9 °C) 87 19 105/58 95 %   09/07/20 0936 98.6 °F (37 °C) 85 18 95/52 98 %   09/07/20 0843 98.4 °F (36.9 °C) 75 17 97/57 95 %   09/07/20 0807    94/53    09/07/20 0751    91/51    09/07/20 0743 98.8 °F (37.1 °C) 93 20 (!) 79/35 97 %   09/07/20 0728 98.3 °F (36.8 °C) 74 19 104/76 98 %   09/07/20 0723 98.3 °F (36.8 °C) 96 16 99/57 96 %   09/07/20 0506 98.2 °F (36.8 °C) 70 16 127/63 100 %   09/07/20 0415  68  113/58 94 %   09/07/20 0329  66  103/55 96 %   09/07/20 0325 97.9 °F (36.6 °C) 77 18  97 %   09/07/20 0309  77  102/51 97 %   09/07/20 0308 98 °F (36.7 °C) 74 18 102/51 96 %   09/07/20 0242 98.3 °F (36.8 °C) 86  112/56 98 %   09/07/20 0205  81   97 %   09/07/20 0159  61  98/51 93 %   09/07/20 0036 98.3 °F (36.8 °C) 83 18 110/53 96 %   09/07/20 0032  97  110/53 (!) 89 %     Oxygen Therapy  O2 Sat (%): 100 % (09/07/20 1335)  Pulse via Oximetry: 68 beats per minute (09/07/20 0415)  O2 Device: Room air (09/07/20 0325)    Intake/Output Summary (Last 24 hours) at 9/7/2020 1447  Last data filed at 9/7/2020 1032  Gross per 24 hour   Intake 510.9 ml   Output    Net 510.9 ml         REVIEW OF SYSTEMS: Comprehensive ROS performed and negative except as stated in HPI. Physical Examination:  General:    Well nourished. No gross distress. Alert and oriented x3. Appears ill. Head:  Normocephalic, atraumatic, nares patent  Eyes:  Extraocular movements intact, normal sclera  CV:   RRR. No  Murmurs, clicks, or gallops, distal pulses intact  Lungs:   Unlabored, no cyanosis, no wheeze  Abdomen:   Soft, nondistended, nontender. Extremities: Warm and dry. No cyanosis or edema. Skin:     No rashes or jaundice. Pale. Neuro:  No gross focal deficits, no tremor  Psych:  Mood and affect appropriate    Data Review:  I have reviewed all labs, meds, telemetry events, and studies from the last 24 hours.     Recent Results (from the past 24 hour(s))   TYPE & SCREEN    Collection Time: 09/07/20 12:42 AM   Result Value Ref Range    Crossmatch Expiration 09/10/2020     ABO/Rh(D) O POSITIVE     Antibody screen NEG     Comment       NOTIFIED NATALIA AT 0142 9.7.20 THAT UNIT OF RBCs WAS READY  JL    Unit number [id number]     Blood component type RC LR     Unit division 00     Status of unit ISSUED     Crossmatch result Compatible     Unit number [id number]     Blood component type RC LR     Unit division 00     Status of unit ISSUED     Crossmatch result Compatible     Unit number [id number]     Blood component type RC LR     Unit division 00     Status of unit ISSUED     Crossmatch result Compatible     Unit number [id number]     Blood component type RC LR     Unit division 00     Status of unit ISSUED     Crossmatch result Compatible     Unit number [id number]     Blood component type RC LR     Unit division 00     Status of unit ISSUED     Crossmatch result Compatible     Unit number [id number]     Blood component type RC LR     Unit division 00     Status of unit ISSUED     Crossmatch result Compatible     Unit number [id number]     Blood component type RC LR     Unit division 00     Status of unit ISSUED     Crossmatch result Compatible     Unit number [id number]     Blood component type RC LR     Unit division 00     Status of unit ISSUED     Crossmatch result Compatible     Unit number [id number]     Blood component type RC LR     Unit division 00     Status of unit ISSUED     Crossmatch result Compatible     Unit number [id number]     Blood component type RC LR     Unit division 00     Status of unit ISSUED     Crossmatch result Compatible     Unit number [id number]     Blood component type RC LR     Unit division 00     Status of unit ISSUED     Crossmatch result Compatible     Unit number [id number]     Blood component type RC LR     Unit division 00     Status of unit ISSUED     Crossmatch result Compatible     Unit number [id number]     Blood component type RC LR     Unit division 00 Status of unit ISSUED     Crossmatch result Compatible     Unit number [id number]     Blood component type RC LR     Unit division 00     Status of unit ISSUED     Crossmatch result Compatible     Unit number [id number]     Blood component type RC LR     Unit division 00     Status of unit ISSUED     Crossmatch result Compatible     Unit number [id number]     Blood component type RC LR     Unit division 00     Status of unit ALLOCATED     Crossmatch result Compatible     Unit number [id number]     Blood component type RC LR     Unit division 00     Status of unit ALLOCATED     Crossmatch result Compatible     Unit number [id number]     Blood component type RC LR     Unit division 00     Status of unit ALLOCATED     Crossmatch result Compatible     Unit number [id number]     Blood component type RC LR     Unit division 00     Status of unit ALLOCATED     Crossmatch result Compatible    CBC WITH AUTOMATED DIFF    Collection Time: 09/07/20 12:46 AM   Result Value Ref Range    WBC 16.6 (H) 4.3 - 11.1 K/uL    RBC 2.34 (L) 4.23 - 5.6 M/uL    HGB 6.7 (LL) 13.6 - 17.2 g/dL    HCT 21.6 (L) 41.1 - 50.3 %    MCV 92.3 79.6 - 97.8 FL    MCH 28.6 26.1 - 32.9 PG    MCHC 31.0 (L) 31.4 - 35.0 g/dL    RDW 18.1 (H) 11.9 - 14.6 %    PLATELET 349 091 - 003 K/uL    MPV 10.7 9.4 - 12.3 FL    ABSOLUTE NRBC 0.04 0.0 - 0.2 K/uL    DF AUTOMATED      NEUTROPHILS 74 43 - 78 %    LYMPHOCYTES 5 (L) 13 - 44 %    MONOCYTES 12 4.0 - 12.0 %    EOSINOPHILS 5 0.5 - 7.8 %    BASOPHILS 0 0.0 - 2.0 %    IMMATURE GRANULOCYTES 4 0.0 - 5.0 %    ABS. NEUTROPHILS 12.2 (H) 1.7 - 8.2 K/UL    ABS. LYMPHOCYTES 0.8 0.5 - 4.6 K/UL    ABS. MONOCYTES 1.9 (H) 0.1 - 1.3 K/UL    ABS. EOSINOPHILS 0.9 (H) 0.0 - 0.8 K/UL    ABS. BASOPHILS 0.1 0.0 - 0.2 K/UL    ABS. IMM.  GRANS. 0.7 (H) 0.0 - 0.5 K/UL   METABOLIC PANEL, COMPREHENSIVE    Collection Time: 09/07/20 12:46 AM   Result Value Ref Range    Sodium 140 136 - 145 mmol/L    Potassium 3.9 3.5 - 5.1 mmol/L    Chloride 110 (H) 98 - 107 mmol/L    CO2 23 21 - 32 mmol/L    Anion gap 7 7 - 16 mmol/L    Glucose 170 (H) 65 - 100 mg/dL    BUN 16 8 - 23 MG/DL    Creatinine 0.92 0.8 - 1.5 MG/DL    GFR est AA >60 >60 ml/min/1.73m2    GFR est non-AA >60 >60 ml/min/1.73m2    Calcium 7.7 (L) 8.3 - 10.4 MG/DL    Bilirubin, total 0.5 0.2 - 1.1 MG/DL    ALT (SGPT) 20 12 - 65 U/L    AST (SGOT) 21 15 - 37 U/L    Alk.  phosphatase 96 50 - 136 U/L    Protein, total 4.6 (L) 6.3 - 8.2 g/dL    Albumin 1.8 (L) 3.2 - 4.6 g/dL    Globulin 2.8 2.3 - 3.5 g/dL    A-G Ratio 0.6 (L) 1.2 - 3.5     LIPASE    Collection Time: 09/07/20 12:46 AM   Result Value Ref Range    Lipase 1,204 (H) 73 - 393 U/L   PROTHROMBIN TIME + INR    Collection Time: 09/07/20  1:53 AM   Result Value Ref Range    Prothrombin time 20.3 (H) 12.0 - 14.7 sec    INR 1.7     PTT    Collection Time: 09/07/20  1:53 AM   Result Value Ref Range    aPTT 33.7 24.3 - 35.4 SEC   HGB & HCT    Collection Time: 09/07/20  5:32 AM   Result Value Ref Range    HGB 7.5 (L) 13.6 - 17.2 g/dL    HCT 23.3 (L) 41.1 - 50.3 %   HGB & HCT    Collection Time: 09/07/20 10:29 AM   Result Value Ref Range    HGB 7.4 (L) 13.6 - 17.2 g/dL    HCT 22.7 (L) 41.1 - 50.3 %   FIBRINOGEN    Collection Time: 09/07/20 10:29 AM   Result Value Ref Range    Fibrinogen 156 (L) 190 - 501 mg/dL   PLASMA, ALLOCATE    Collection Time: 09/07/20 10:45 AM   Result Value Ref Range    Unit number [id number]     Blood component type FP 24h, Thaw     Unit division 00     Status of unit ISSUED     Unit number [id number]     Blood component type FP 24h,Thaw     Unit division B0     Status of unit ISSUED     Unit number [id number]     Blood component type FP 24h, Thaw     Unit division 00     Status of unit ISSUED     Unit number [id number]     Blood component type FP 24h, Thaw     Unit division 00     Status of unit ISSUED     Unit number [id number]     Blood component type FP 24h, Thaw     Unit division 00 Status of unit ISSUED     Unit number [id number]     Blood component type FP 24h, Thaw     Unit division 00     Status of unit ALLOCATED    HGB & HCT    Collection Time: 09/07/20 11:32 AM   Result Value Ref Range    HGB 7.1 (L) 13.6 - 17.2 g/dL    HCT 21.4 (L) 41.1 - 50.3 %   GLUCOSE, POC    Collection Time: 09/07/20 12:20 PM   Result Value Ref Range    Glucose (POC) 184 (H) 65 - 100 mg/dL   CRYOPRECIPITATE, ALLOCATE    Collection Time: 09/07/20  1:30 PM   Result Value Ref Range    Unit number [id number]     Blood component type CRYO,5U Thaw     Unit division 00     Status of unit ISSUED     Unit number [id number]     Blood component type CRYO,5U Thaw     Unit division 00     Status of unit ISSUED     Unit number [id number]     Blood component type CRYO,5U Thaw     Unit division 00     Status of unit ISSUED    PLATELETS, ALLOCATE    Collection Time: 09/07/20  2:05 PM   Result Value Ref Range    Unit number [id number]     Blood component type PLTPH LR 3     Unit division 00     Status of unit ISSUED     Unit number [id number]     Blood component type PLTPH LR,1     Unit division 00     Status of unit ALLOCATED    POC CG8I    Collection Time: 09/07/20  2:35 PM   Result Value Ref Range    pH (POC) 7.31 (L) 7.35 - 7.45      pCO2 (POC) 41.2 35 - 45 MMHG    pO2 (POC) 338 (H) 75 - 100 MMHG    HCO3 (POC) 20.6 (L) 22 - 26 MMOL/L    sO2 (POC) 100 (H) 95 - 98 %    Base deficit (POC) 5 mmol/L    Sodium,  136 - 145 MMOL/L    Potassium, POC 4.3 3.5 - 5.1 MMOL/L    Glucose,  (H) 65 - 100 MG/DL    Calcium, ionized (POC) 1.00 (L) 1.12 - 1.32 mmol/L        All Micro Results     None          Current Meds:  Current Facility-Administered Medications   Medication Dose Route Frequency    0.9% sodium chloride infusion 250 mL  250 mL IntraVENous PRN    0.9% sodium chloride infusion  75 mL/hr IntraVENous CONTINUOUS    ondansetron (ZOFRAN) injection 4 mg  4 mg IntraVENous Q6H PRN    promethazine (PHENERGAN) with saline injection 12.5 mg  12.5 mg IntraVENous Q6H PRN    pantoprazole (PROTONIX) 40 mg in 0.9% sodium chloride 10 mL injection  40 mg IntraVENous Q12H    octreotide (SANDOSTATIN) 500 mcg in 0.9% sodium chloride 500 mL infusion  50 mcg/hr IntraVENous CONTINUOUS    acetaminophen (TYLENOL) suppository 650 mg  650 mg Rectal Q4H PRN    diphenhydrAMINE (BENADRYL) capsule 25 mg  25 mg Oral Q6H PRN    0.9% sodium chloride infusion 250 mL  250 mL IntraVENous PRN    HYDROmorphone (PF) (DILAUDID) injection 0.5 mg  0.5 mg IntraVENous Q4H PRN    0.9% sodium chloride infusion 250 mL  250 mL IntraVENous PRN    NOREPINephrine (LEVOPHED) 4 mg in 5% dextrose 250 mL infusion  0.5-16 mcg/min IntraVENous TITRATE    0.9% sodium chloride infusion 250 mL  250 mL IntraVENous PRN    0.9% sodium chloride infusion 250 mL  250 mL IntraVENous PRN    0.9% sodium chloride infusion 250 mL  250 mL IntraVENous PRN    vasopressin (VASOSTRICT) 20 Units in 0.9% sodium chloride 100 mL infusion  0-0.1 Units/min IntraVENous TITRATE     Facility-Administered Medications Ordered in Other Encounters   Medication Dose Route Frequency    midazolam (VERSED) injection   IntraVENous PRN    rocuronium injection    PRN    fentaNYL citrate (PF) injection   IntraVENous PRN       Diet:  DIET NPO    Other Studies (last 24 hours):  Ct Abd Pelv W Cont    Result Date: 9/7/2020  EXAM: CT abdomen and pelvis with IV contrast. INDICATION: Abdominal pain. Pancreatic cancer. COMPARISON: Prior CT abdomen and pelvis on August 17, 2020. TECHNIQUE: Axial CT images of the abdomen and pelvis were obtained after the intravenous injection of 100 mL Isovue 370 CT contrast. Radiation dose reduction techniques were used for this study. Our CT scanners use one or all of the following:  Automated exposure control, adjustment of the mA or kV according to patient size, iterative reconstruction. FINDINGS: - Liver:  Although a 4.8 cm cyst in the liver is unchanged, other numerous smaller hypodense masses throughout both liver lobes have probably progressed in size and number. - Gallbladder and bile ducts: New density in the gallbladder lumen likely relates to excreted contrast. There is no evidence of acute cholecystitis or biliary tract dilatation. The portal vein is patent. - Spleen: Within normal limits. - Urinary tract: Again noted are left parapelvic kidney cyst. A few tiny right kidney cysts are unchanged. The urinary bladder is within normal limits. - Adrenals: Within normal limits. - Pancreas: No significant change in the previously described approximate 4 cm pancreatic head mass. No acute peripancreatic inflammation or ductal dilatation is seen. There are new clips or fiducial markers adjacent to the pancreatic head. - Gastrointestinal tract: Within normal limits. - Retroperitoneum: Mild to moderate abdominal aorta atherosclerosis. - Peritoneal cavity and abdominal wall: No ascites or free intraperitoneal air. - Pelvis: Within normal limits. - Spine/bones: No acute process. - Other comments: None. IMPRESSION: 1. Stable pancreatic head mass. 2. Progressed widespread liver metastases. Xr Chest Port    Result Date: 9/7/2020  EXAM: Chest x-ray. INDICATION: Cough. COMPARISON: Prior chest x-ray on September 4, 2020. TECHNIQUE: Single frontal view. FINDINGS: The lungs are clear, except for minimal scarring in the left lung base. The cardiac size, mediastinal contour and pulmonary vasculature are normal. No pneumothorax or pleural effusion is seen. There is a new right chest wall infusion port catheter, with its tip in the region of the superior vena cava. IMPRESSION: No acute process.       Assessment and Plan:     Hospital Problems as of 9/7/2020 Date Reviewed: 8/31/2020          Codes Class Noted - Resolved POA    Pancreatitis ICD-10-CM: K85.90  ICD-9-CM: 147.1  9/7/2020 - Present Yes        * (Principal) Acute blood loss anemia ICD-10-CM: D62  ICD-9-CM: 285.1  9/2/2020 - Present Unknown        Pancreatic cancer ([facility] ICD-10-CM: C25.9  ICD-9-CM: 157.9  9/2/2020 - Present Yes        Duodenal mass ICD-10-CM: K31.89  ICD-9-CM: 537.89  9/2/2020 - Present Yes        Iron deficiency anemia ICD-10-CM: D50.9  ICD-9-CM: 280.9  9/1/2020 - Present Yes        Malignant neoplasm of head of pancreas ([facility] ICD-10-CM: C25.0  ICD-9-CM: 157.0  8/31/2020 - Present Yes        Dietz's esophagus without dysplasia ICD-10-CM: K22.70  ICD-9-CM: 530.85  8/12/2020 - Present Yes        Essential hypertension ICD-10-CM: I10  ICD-9-CM: 401.9  5/8/2018 - Present Yes        Mixed hyperlipidemia ICD-10-CM: E78.2  ICD-9-CM: 272.2  5/8/2018 - Present Yes              A/P:    1.-Acute blood loss anemia: etiology likely recurrent bleeding from ulcerated duodenal mass. FOBT was positive in the ED. Admit as inpatient  Keep NPO  IVFs  Continue IV ppi bid and octreotide gtt  Antiemetics  Monitor abdomen and pelvis ct scan  Serial HH q 6hrs and transfuse prn  Transfuse as needed-patient has received a total of  Units PRBC and 4 units FFP  IR consult in am to evaluate embolization-they state no further intervention from them, general surgery consulted and GI-patient taken to OR by general surgery 9/7 for ongoing hematochezia      -Abdominal pain, elevated lipase:  Likely related to pancreatic CA  Keep npo, ivfs, conservative management  Pain control     -Adenocarcinoma of the pancreas with liver mets:  Patient has medi-port.  Plan to start chemotherapy on 9/11  Oncology consulted     -HTN: hold medications         Signed:  Abilio Solano NP

## 2020-09-08 ENCOUNTER — APPOINTMENT (OUTPATIENT)
Dept: GENERAL RADIOLOGY | Age: 65
DRG: 326 | End: 2020-09-08
Attending: INTERNAL MEDICINE
Payer: COMMERCIAL

## 2020-09-08 PROBLEM — K26.4 DUODENAL ULCER WITH HEMORRHAGE: Status: ACTIVE | Noted: 2020-09-08

## 2020-09-08 PROBLEM — R57.1 HYPOVOLEMIC SHOCK (HCC): Status: ACTIVE | Noted: 2020-09-08

## 2020-09-08 PROBLEM — J95.821 RESPIRATORY FAILURE, POST-OPERATIVE (HCC): Status: ACTIVE | Noted: 2020-09-08

## 2020-09-08 LAB
ANION GAP SERPL CALC-SCNC: 9 MMOL/L (ref 7–16)
ARTERIAL PATENCY WRIST A: ABNORMAL
BASE DEFICIT BLD-SCNC: 5 MMOL/L
BDY SITE: ABNORMAL
BLD PROD TYP BPU: NORMAL
BPU ID: NORMAL
BUN SERPL-MCNC: 23 MG/DL (ref 8–23)
CALCIUM SERPL-MCNC: 7.2 MG/DL (ref 8.3–10.4)
CHLORIDE SERPL-SCNC: 117 MMOL/L (ref 98–107)
CO2 BLD-SCNC: 20 MMOL/L
CO2 SERPL-SCNC: 21 MMOL/L (ref 21–32)
COLLECT TIME,HTIME: 340
CREAT SERPL-MCNC: 1.87 MG/DL (ref 0.8–1.5)
ERYTHROCYTE [DISTWIDTH] IN BLOOD BY AUTOMATED COUNT: 15.4 % (ref 11.9–14.6)
EXHALED MINUTE VOLUME, VE: 10.1 L/MIN
GAS FLOW.O2 O2 DELIVERY SYS: ABNORMAL L/MIN
GLUCOSE BLD STRIP.AUTO-MCNC: 131 MG/DL (ref 65–100)
GLUCOSE BLD STRIP.AUTO-MCNC: 134 MG/DL (ref 65–100)
GLUCOSE BLD STRIP.AUTO-MCNC: 157 MG/DL (ref 65–100)
GLUCOSE SERPL-MCNC: 153 MG/DL (ref 65–100)
HCO3 BLD-SCNC: 18.9 MMOL/L (ref 22–26)
HCT VFR BLD AUTO: 24.9 % (ref 41.1–50.3)
HCT VFR BLD AUTO: 26.9 % (ref 41.1–50.3)
HCT VFR BLD AUTO: 28.9 % (ref 41.1–50.3)
HGB BLD-MCNC: 8.3 G/DL (ref 13.6–17.2)
HGB BLD-MCNC: 8.9 G/DL (ref 13.6–17.2)
HGB BLD-MCNC: 9.7 G/DL (ref 13.6–17.2)
MCH RBC QN AUTO: 29 PG (ref 26.1–32.9)
MCHC RBC AUTO-ENTMCNC: 33.6 G/DL (ref 31.4–35)
MCV RBC AUTO: 86.5 FL (ref 79.6–97.8)
NRBC # BLD: 0.09 K/UL (ref 0–0.2)
O2/TOTAL GAS SETTING VFR VENT: 40 %
PCO2 BLD: 31 MMHG (ref 35–45)
PEEP RESPIRATORY: 8 CMH2O
PH BLD: 7.39 [PH] (ref 7.35–7.45)
PLATELET # BLD AUTO: 92 K/UL (ref 150–450)
PMV BLD AUTO: 10.9 FL (ref 9.4–12.3)
PO2 BLD: 103 MMHG (ref 75–100)
POTASSIUM SERPL-SCNC: 4.3 MMOL/L (ref 3.5–5.1)
PRESSURE SUPPORT SETTING VENT: 6 CMH2O
RBC # BLD AUTO: 3.34 M/UL (ref 4.23–5.6)
SAO2 % BLD: 98 % (ref 95–98)
SERVICE CMNT-IMP: ABNORMAL
SERVICE CMNT-IMP: ABNORMAL
SODIUM SERPL-SCNC: 147 MMOL/L (ref 136–145)
SPECIMEN TYPE: ABNORMAL
STATUS OF UNIT,%ST: NORMAL
TOTAL RESP. RATE, ITRR: 21
UNIT DIVISION, %UDIV: 0
UNIT DIVISION, %UDIV: NORMAL
VENTILATION MODE VENT: ABNORMAL
WBC # BLD AUTO: 14.9 K/UL (ref 4.3–11.1)

## 2020-09-08 PROCEDURE — C9113 INJ PANTOPRAZOLE SODIUM, VIA: HCPCS | Performed by: INTERNAL MEDICINE

## 2020-09-08 PROCEDURE — 74011250636 HC RX REV CODE- 250/636: Performed by: SURGERY

## 2020-09-08 PROCEDURE — 74011250636 HC RX REV CODE- 250/636: Performed by: INTERNAL MEDICINE

## 2020-09-08 PROCEDURE — 74011250637 HC RX REV CODE- 250/637: Performed by: INTERNAL MEDICINE

## 2020-09-08 PROCEDURE — 43632 REMOVAL OF STOMACH PARTIAL: CPT | Performed by: SURGERY

## 2020-09-08 PROCEDURE — 74011000250 HC RX REV CODE- 250: Performed by: INTERNAL MEDICINE

## 2020-09-08 PROCEDURE — 82803 BLOOD GASES ANY COMBINATION: CPT

## 2020-09-08 PROCEDURE — P9045 ALBUMIN (HUMAN), 5%, 250 ML: HCPCS | Performed by: INTERNAL MEDICINE

## 2020-09-08 PROCEDURE — 71045 X-RAY EXAM CHEST 1 VIEW: CPT

## 2020-09-08 PROCEDURE — 94003 VENT MGMT INPAT SUBQ DAY: CPT

## 2020-09-08 PROCEDURE — 47600 CHOLECYSTECTOMY: CPT | Performed by: SURGERY

## 2020-09-08 PROCEDURE — 80048 BASIC METABOLIC PNL TOTAL CA: CPT

## 2020-09-08 PROCEDURE — 82962 GLUCOSE BLOOD TEST: CPT

## 2020-09-08 PROCEDURE — 99231 SBSQ HOSP IP/OBS SF/LOW 25: CPT | Performed by: NURSE PRACTITIONER

## 2020-09-08 PROCEDURE — 77010033678 HC OXYGEN DAILY

## 2020-09-08 PROCEDURE — 74011000258 HC RX REV CODE- 258: Performed by: SURGERY

## 2020-09-08 PROCEDURE — 74011000258 HC RX REV CODE- 258: Performed by: INTERNAL MEDICINE

## 2020-09-08 PROCEDURE — 85018 HEMOGLOBIN: CPT

## 2020-09-08 PROCEDURE — 43501 SURGICAL REPAIR OF STOMACH: CPT | Performed by: SURGERY

## 2020-09-08 PROCEDURE — 77030027138 HC INCENT SPIROMETER -A

## 2020-09-08 PROCEDURE — 99232 SBSQ HOSP IP/OBS MODERATE 35: CPT | Performed by: INTERNAL MEDICINE

## 2020-09-08 PROCEDURE — P9016 RBC LEUKOCYTES REDUCED: HCPCS

## 2020-09-08 PROCEDURE — 99233 SBSQ HOSP IP/OBS HIGH 50: CPT | Performed by: INTERNAL MEDICINE

## 2020-09-08 PROCEDURE — 36592 COLLECT BLOOD FROM PICC: CPT

## 2020-09-08 PROCEDURE — 74011636637 HC RX REV CODE- 636/637: Performed by: PHYSICIAN ASSISTANT

## 2020-09-08 PROCEDURE — 36430 TRANSFUSION BLD/BLD COMPNT: CPT

## 2020-09-08 PROCEDURE — 65610000001 HC ROOM ICU GENERAL

## 2020-09-08 PROCEDURE — 85027 COMPLETE CBC AUTOMATED: CPT

## 2020-09-08 RX ORDER — INSULIN LISPRO 100 [IU]/ML
0-10 INJECTION, SOLUTION INTRAVENOUS; SUBCUTANEOUS
Status: DISCONTINUED | OUTPATIENT
Start: 2020-09-08 | End: 2020-09-16 | Stop reason: HOSPADM

## 2020-09-08 RX ORDER — DEXTROSE 40 %
15 GEL (GRAM) ORAL AS NEEDED
Status: DISCONTINUED | OUTPATIENT
Start: 2020-09-08 | End: 2020-09-16 | Stop reason: HOSPADM

## 2020-09-08 RX ORDER — DEXTROSE 50 % IN WATER (D50W) INTRAVENOUS SYRINGE
25-50 AS NEEDED
Status: DISCONTINUED | OUTPATIENT
Start: 2020-09-08 | End: 2020-09-16 | Stop reason: HOSPADM

## 2020-09-08 RX ORDER — SODIUM CHLORIDE 9 MG/ML
250 INJECTION, SOLUTION INTRAVENOUS AS NEEDED
Status: DISCONTINUED | OUTPATIENT
Start: 2020-09-08 | End: 2020-09-09 | Stop reason: SDUPTHER

## 2020-09-08 RX ADMIN — INSULIN LISPRO 2 UNITS: 100 INJECTION, SOLUTION INTRAVENOUS; SUBCUTANEOUS at 12:55

## 2020-09-08 RX ADMIN — PIPERACILLIN AND TAZOBACTAM 4.5 G: 4; .5 INJECTION, POWDER, FOR SOLUTION INTRAVENOUS at 21:38

## 2020-09-08 RX ADMIN — HYDROMORPHONE HYDROCHLORIDE 1 MG: 1 INJECTION, SOLUTION INTRAMUSCULAR; INTRAVENOUS; SUBCUTANEOUS at 19:51

## 2020-09-08 RX ADMIN — SODIUM CHLORIDE 40 MG: 9 INJECTION INTRAMUSCULAR; INTRAVENOUS; SUBCUTANEOUS at 21:38

## 2020-09-08 RX ADMIN — SODIUM CHLORIDE, SODIUM LACTATE, POTASSIUM CHLORIDE, AND CALCIUM CHLORIDE 125 ML/HR: 600; 310; 30; 20 INJECTION, SOLUTION INTRAVENOUS at 21:46

## 2020-09-08 RX ADMIN — OCTREOTIDE ACETATE 50 MCG/HR: 500 INJECTION, SOLUTION INTRAVENOUS; SUBCUTANEOUS at 20:38

## 2020-09-08 RX ADMIN — SODIUM CHLORIDE, SODIUM LACTATE, POTASSIUM CHLORIDE, AND CALCIUM CHLORIDE 125 ML/HR: 600; 310; 30; 20 INJECTION, SOLUTION INTRAVENOUS at 04:00

## 2020-09-08 RX ADMIN — SODIUM CHLORIDE 100 MCG/HR: 900 INJECTION, SOLUTION INTRAVENOUS at 12:46

## 2020-09-08 RX ADMIN — Medication 1 EACH: at 23:50

## 2020-09-08 RX ADMIN — OCTREOTIDE ACETATE 50 MCG/HR: 500 INJECTION, SOLUTION INTRAVENOUS; SUBCUTANEOUS at 08:14

## 2020-09-08 RX ADMIN — ALBUMIN (HUMAN) 50 G: 12.5 INJECTION, SOLUTION INTRAVENOUS at 00:33

## 2020-09-08 RX ADMIN — PIPERACILLIN AND TAZOBACTAM 4.5 G: 4; .5 INJECTION, POWDER, FOR SOLUTION INTRAVENOUS at 14:34

## 2020-09-08 RX ADMIN — SODIUM CHLORIDE 40 MG: 9 INJECTION INTRAMUSCULAR; INTRAVENOUS; SUBCUTANEOUS at 10:01

## 2020-09-08 RX ADMIN — PIPERACILLIN AND TAZOBACTAM 4.5 G: 4; .5 INJECTION, POWDER, FOR SOLUTION INTRAVENOUS at 06:07

## 2020-09-08 RX ADMIN — OCTREOTIDE ACETATE 50 MCG/HR: 500 INJECTION, SOLUTION INTRAVENOUS; SUBCUTANEOUS at 18:44

## 2020-09-08 RX ADMIN — SODIUM CHLORIDE, SODIUM LACTATE, POTASSIUM CHLORIDE, AND CALCIUM CHLORIDE 125 ML/HR: 600; 310; 30; 20 INJECTION, SOLUTION INTRAVENOUS at 12:48

## 2020-09-08 NOTE — PROGRESS NOTES
Critical Care Daily Progress Note: 9/8/2020  Admission Date: 9/7/2020     The patient's chart is reviewed and the patient is discussed with the staff. 60 y.o.  male seen and evaluated at the request of Dr. Mike Alonzo. He has a history of pancreatic cancer with duodenal ulcerated lesion with hepatic mets. He was just admitted 9/2-/96 with hematemesis and had IR embolization of the mass. However, came back with BRBPR. Today a rapid response was called due to the patient having multiple large bloody BP's followed by hematemesis and hypotension. He required a large number of blood products and was taken emergently to the OR where he underwent ex lap with resection of distal stomach and proximal duodenum with oversewing of bleeding vessel in the second portion fo the duodenum. He was brought to the ICU intubated and hypertensive.  He was given 10 of IV labetalol with BP still near 200  Subjective:   Doing ok post -op with no further bleeding, tolerating ps of 6, alert , but is calm       Current Facility-Administered Medications   Medication Dose Route Frequency    0.9% sodium chloride infusion 250 mL  250 mL IntraVENous PRN    insulin lispro (HUMALOG) injection 0-10 Units  0-10 Units SubCUTAneous AC&HS    dextrose 40% (GLUTOSE) oral gel 1 Tube  15 g Oral PRN    glucagon (GLUCAGEN) injection 1 mg  1 mg IntraMUSCular PRN    dextrose (D50W) injection syrg 12.5-25 g  25-50 mL IntraVENous PRN    0.9% sodium chloride infusion 250 mL  250 mL IntraVENous PRN    ondansetron (ZOFRAN) injection 4 mg  4 mg IntraVENous Q6H PRN    promethazine (PHENERGAN) with saline injection 12.5 mg  12.5 mg IntraVENous Q6H PRN    pantoprazole (PROTONIX) 40 mg in 0.9% sodium chloride 10 mL injection  40 mg IntraVENous Q12H    octreotide (SANDOSTATIN) 500 mcg in 0.9% sodium chloride 500 mL infusion  50 mcg/hr IntraVENous CONTINUOUS    acetaminophen (TYLENOL) suppository 650 mg  650 mg Rectal Q4H PRN    diphenhydrAMINE (BENADRYL) capsule 25 mg  25 mg Oral Q6H PRN    0.9% sodium chloride infusion 250 mL  250 mL IntraVENous PRN    HYDROmorphone (PF) (DILAUDID) injection 0.5 mg  0.5 mg IntraVENous Q4H PRN    0.9% sodium chloride infusion 250 mL  250 mL IntraVENous PRN    NOREPINephrine (LEVOPHED) 4 mg in 5% dextrose 250 mL infusion  0.5-16 mcg/min IntraVENous TITRATE    0.9% sodium chloride infusion 250 mL  250 mL IntraVENous PRN    0.9% sodium chloride infusion 250 mL  250 mL IntraVENous PRN    0.9% sodium chloride infusion 250 mL  250 mL IntraVENous PRN    vasopressin (VASOSTRICT) 20 Units in 0.9% sodium chloride 100 mL infusion  0-0.1 Units/min IntraVENous TITRATE    HYDROmorphone (PF) (DILAUDID) injection 1 mg  1 mg IntraVENous Q2H PRN    piperacillin-tazobactam (ZOSYN) 4.5 g in 0.9% sodium chloride (MBP/ADV) 100 mL  4.5 g IntraVENous Q8H    fentaNYL in normal saline (pf) 25 mcg/mL infusion   mcg/hr IntraVENous TITRATE    lactated Ringers infusion  125 mL/hr IntraVENous CONTINUOUS       Review of Systems   Unobtainable due to patient status. Objective:     Vitals:    09/08/20 0646 09/08/20 0700 09/08/20 0800 09/08/20 0830   BP:  134/69 130/63    Pulse:  76 71 70   Resp:  (!) 36 14 25   Temp:   99.7 °F (37.6 °C)    SpO2: 98% 97% 98% 98%   Weight:       Height:             Intake/Output Summary (Last 24 hours) at 9/8/2020 1115  Last data filed at 9/8/2020 1022  Gross per 24 hour   Intake 5606.29 ml   Output 3340 ml   Net 2266.29 ml         Physical Exam:          Constitutional:  intubated and mechanically ventilated.   EENMT:  Sclera clear, pupils equal, oral mucosa moist  Respiratory:  clear  Cardiovascular:  RRR with no M,G,R;  Gastrointestinal:  soft with no tenderness; positive bowel sounds present  Musculoskeletal:  warm with no cyanosis, no lower extremity edema  Skin:  no jaundice or ecchymosis  Neurologic: no gross neuro deficits     Psychiatric:  alert and  responsive    LINES:    ETT    DRIPS: Octreotide, fentanyl    CXR:      Ventilator Settings  Mode FIO2 Rate Tidal Volume Pressure PEEP   Pressure support  30 %    500 ml  6 cm H2O  8 cm H20      Peak airway pressure: 15.1 cm H2O   Minute ventilation: 10.6 l/min     ABG:   Recent Labs     09/08/20  0346 09/07/20  1815 09/07/20  1611   PHI 7.39 7.33* 7.42   PCO2I 31.0* 41.5 35.1   PO2I 103* 202* 345*   HCO3I 18.9* 21.7* 22.9       LAB  Recent Labs     09/07/20  1611 09/07/20  1531 09/07/20  1435 09/07/20  1220   GLUCPOC 203* 224* 248* 184*     Recent Labs     09/08/20  1002 09/08/20  0146 09/07/20 2002 09/07/20  1600  09/07/20  0153   WBC  --  14.9* 17.0* 11.4*  --   --    HGB 8.9* 9.7* 12.5* 9.6*   < >  --    HCT 26.9* 28.9* 36.4* 28.1*   < >  --    PLT  --  92* 107* 92*  --   --    INR  --   --   --  1.4  --  1.7    < > = values in this interval not displayed. Recent Labs     09/08/20  0344 09/07/20 2002 09/07/20  1600 09/07/20  0046   * 146* 146* 140   K 4.3 4.4 4.4 3.9   * 119* 117* 110*   CO2 21 22 24 23   * 168* 197* 170*   BUN 23 19 18 16   CREA 1.87* 1.19 1.06 0.92   CA 7.2* 7.4* 7.4* 7.7*   ALB  --  1.9*  --  1.8*     No results for input(s): LCAD, LAC in the last 72 hours. Patient Active Problem List   Diagnosis Code    Impaired fasting glucose R73.01    Essential hypertension I10    Mixed hyperlipidemia E78.2    PAC (premature atrial contraction) I49.1    Overweight (BMI 25.0-29. 9) [id number]    Duodenal ulcer K26.9    Dietz's esophagus without dysplasia K22.70    Malignant neoplasm of head of pancreas (HCC) C25.0    Iron deficiency anemia D50.9    Hematemesis K92.0    Acute blood loss anemia D62    Pancreatic cancer (HCC) C25.9    Hypotension I95.9    Duodenal mass K31.89    Pancreatitis K85.90    Hypovolemic shock (HCC) R57.1    Respiratory failure, post-operative (HCC) J95.821    Duodenal ulcer with hemorrhage K26.4         Assessment:  (Medical Decision Making)     Hospital Problems  Date Reviewed: 8/31/2020          Codes Class Noted POA    Hypovolemic shock ([facility] ICD-10-CM: R57.1  ICD-9-CM: 785.59  9/8/2020 Unknown        Respiratory failure, post-operative ([facility] ICD-10-CM: M11.787  ICD-9-CM: 518.51  9/8/2020 Unknown        Duodenal ulcer with hemorrhage ICD-10-CM: K26.4  ICD-9-CM: 532.40  9/8/2020 Unknown        Pancreatitis ICD-10-CM: K85.90  ICD-9-CM: 166.4  9/7/2020 Yes        * (Principal) Acute blood loss anemia ICD-10-CM: D62  ICD-9-CM: 285.1  9/2/2020 Unknown        Pancreatic cancer ([facility] ICD-10-CM: C25.9  ICD-9-CM: 157.9  9/2/2020 Yes        Duodenal mass ICD-10-CM: K31.89  ICD-9-CM: 537.89  9/2/2020 Yes        Iron deficiency anemia ICD-10-CM: D50.9  ICD-9-CM: 280.9  9/1/2020 Yes        Malignant neoplasm of head of pancreas ([facility] ICD-10-CM: C25.0  ICD-9-CM: 157.0  8/31/2020 Yes        Dietz's esophagus without dysplasia ICD-10-CM: K22.70  ICD-9-CM: 530.85  8/12/2020 Yes        Essential hypertension ICD-10-CM: I10  ICD-9-CM: 401.9  5/8/2018 Yes        Mixed hyperlipidemia ICD-10-CM: E78.2  ICD-9-CM: 272.2  5/8/2018 Yes              Plan:  (Medical Decision Making)   1    cpap trial  2    Extubate if ok  3    Wean fentanyl  4   Octreotide  5    LR iv fluids  --    More than 50% of the time documented was spent in face-to-face contact with the patient and in the care of the patient on the floor/unit where the patient is located.     Marvin Fuller MD

## 2020-09-08 NOTE — PROGRESS NOTES
Dr. Reddy Sites called about pt's marginal BP. Orders to give 1000cc of albumin, increase LR rate to 125mls/hr, and give prn NS bolus as needed.

## 2020-09-08 NOTE — PROGRESS NOTES
Ventilator check complete; patient has a #8. 0 ET tube secured at the 24 at the teeth. Patient is not sedated. Patient is able to follow commands. Breath sounds are clear. Trachea is midline, Negative for subcutaneous air, and chest excursion is symmetric. Patient is also Negative for cyanosis and is Negative for pitting edema. All alarms are set and audible. Resuscitation bag is at the head of the bed.       Ventilator Settings  Mode FIO2 Rate Tidal Volume Pressure PEEP I:E Ratio   PRVC  40%   16 500 ml     8 cm H20  1:2.5      Peak airway pressure: 21.4 cm H2O   Minute ventilation: 8.3 l/min     Sigifredo Martinez, RT

## 2020-09-08 NOTE — PROGRESS NOTES
Interdisciplinary team rounds were held 9/8/2020 with the following team members:Care Management, Nursing, Nurse Practitioner, Nutrition, Outcomes Management, Pastoral Care, Pharmacy, Physical Therapy, Physician, Respiratory Therapy and Clinical Coordinator. Plan of care discussed. See clinical pathway and/or care plan for interventions and desired outcomes.

## 2020-09-08 NOTE — PROGRESS NOTES
Inpatient Hematology / Oncology Progress Note    Reason for Consult:  Acute blood loss anemia [D62]; Duodenal mass [K31.89]  Referring Physician:  Regino Valderrama MD    24 Hour Events:  S/p exlap yesterday  Remains in ICU intubated  Following commands  Hgb stable at 9.7  VSS, afebrile        ROS:    Unable to complete due to patient condition. 10 point review of systems is otherwise negative with the exception of the elements mentioned above in the HPI.        Allergies   Allergen Reactions    Tetanus And Diphtheria Toxoids Swelling     As child     Past Medical History:   Diagnosis Date    Cancer ([address]     pancreatic    GERD (gastroesophageal reflux disease)     Hernia      Past Surgical History:   Procedure Laterality Date    HX CATARACT REMOVAL Left 12/15/2017    HX HERNIA REPAIR      HX ORTHOPAEDIC Right     tibia- with hardware    IR INSERT TUNL CVC W PORT OVER 5 YEARS  2020    IR OCCL TXCATH HEMMORAGE W SI  9/3/2020     Family History   Problem Relation Age of Onset    No Known Problems Mother     Cancer Father 72         leukemia    Diabetes Sister     No Known Problems Brother     No Known Problems Sister     Cancer Sister      Social History     Socioeconomic History    Marital status:      Spouse name: Not on file    Number of children: Not on file    Years of education: Not on file    Highest education level: Not on file   Occupational History    Not on file   Social Needs    Financial resource strain: Not on file    Food insecurity     Worry: Not on file     Inability: Not on file    Transportation needs     Medical: Not on file     Non-medical: Not on file   Tobacco Use    Smoking status: Former Smoker     Packs/day: 1.00     Years: 10.00     Pack years: 10.00     Last attempt to quit: 2006     Years since quittin.6    Smokeless tobacco: Never Used   Substance and Sexual Activity    Alcohol use: Not Currently    Drug use: Never    Sexual activity: Not on file   Lifestyle    Physical activity     Days per week: Not on file     Minutes per session: Not on file    Stress: Not on file   Relationships    Social connections     Talks on phone: Not on file     Gets together: Not on file     Attends [employer] service: Not on file     Active member of club or organization: Not on file     Attends meetings of clubs or organizations: Not on file     Relationship status: Not on file    Intimate partner violence     Fear of current or ex partner: Not on file     Emotionally abused: Not on file     Physically abused: Not on file     Forced sexual activity: Not on file   Other Topics Concern    Not on file   Social History Narrative    Not on file     Current Facility-Administered Medications   Medication Dose Route Frequency Provider Last Rate Last Dose    0.9% sodium chloride infusion 250 mL  250 mL IntraVENous PRN Chaz Marti MD        insulin lispro (HUMALOG) injection 0-10 Units  0-10 Units SubCUTAneous AC&HS Dayanna Atkinson I, PA        dextrose 40% (GLUTOSE) oral gel 1 Tube  15 g Oral PRN Dayanna Atkinson I, PA        glucagon (GLUCAGEN) injection 1 mg  1 mg IntraMUSCular PRN Dayanna Atkinson I, PA        dextrose (D50W) injection syrg 12.5-25 g  25-50 mL IntraVENous PRN Dayanna Atkinson I, PA        0.9% sodium chloride infusion 250 mL  250 mL IntraVENous PRN Tho Gutierrez III, MD        ondansetron [facility]) injection 4 mg  4 mg IntraVENous Q6H PRN Tripp Mitchell MD   4 mg at 09/07/20 1335    promethazine (PHENERGAN) with saline injection 12.5 mg  12.5 mg IntraVENous Q6H PRN Tripp Mitchell MD        pantoprazole (PROTONIX) 40 mg in 0.9% sodium chloride 10 mL injection  40 mg IntraVENous Q12H Tripp Mitchell MD   40 mg at 09/08/20 1001    octreotide (SANDOSTATIN) 500 mcg in 0.9% sodium chloride 500 mL infusion  50 mcg/hr IntraVENous CONTINUOUS Tripp Mitchell MD 50 mL/hr at 09/08/20 0814 50 mcg/hr at 09/08/20 0814    acetaminophen (TYLENOL) suppository 650 mg  650 mg Rectal Q4H PRN Susan Franco MD        diphenhydrAMINE (BENADRYL) capsule 25 mg  25 mg Oral Q6H PRN Susan Franco MD        0.9% sodium chloride infusion 250 mL  250 mL IntraVENous PRN Susan Franco MD        HYDROmorphone (PF) (DILAUDID) injection 0.5 mg  0.5 mg IntraVENous W9Y PRN Russell Poole MD        0.9% sodium chloride infusion 250 mL  250 mL IntraVENous PRN Dona Smith NP        NOREPINephrine (LEVOPHED) 4 mg in 5% dextrose 250 mL infusion  0.5-16 mcg/min IntraVENous TITRATE Richard Muck C, DO   Stopped at 09/07/20 1727    0.9% sodium chloride infusion 250 mL  250 mL IntraVENous PRN Richard Betancourtk C, DO        0.9% sodium chloride infusion 250 mL  250 mL IntraVENous PRN Maria Luz Greco C, DO        0.9% sodium chloride infusion 250 mL  250 mL IntraVENous PRN Maria Luz Greco, DO        vasopressin (VASOSTRICT) 20 Units in 0.9% sodium chloride 100 mL infusion  0-0.1 Units/min IntraVENous TITRATE Richard Betancourtk C,         HYDROmorphone (PF) (DILAUDID) injection 1 mg  1 mg IntraVENous F0L PRN Russell Poole MD   1 mg at 09/07/20 2223    piperacillin-tazobactam (ZOSYN) 4.5 g in 0.9% sodium chloride (MBP/ADV) 100 mL  4.5 g IntraVENous Y7F Russell Poole MD 25 mL/hr at 09/08/20 0607 4.5 g at 09/08/20 0607    fentaNYL in normal saline (pf) 25 mcg/mL infusion   mcg/hr IntraVENous TITRATE Delonte Mena MD 4 mL/hr at 09/08/20 0701 100 mcg/hr at 09/08/20 0701    lactated Ringers infusion  125 mL/hr IntraVENous CONTINUOUS Noel Jackson  mL/hr at 09/08/20 0400 125 mL/hr at 09/08/20 0400       OBJECTIVE:  Patient Vitals for the past 8 hrs:   BP Temp Pulse Resp SpO2   09/08/20 0830   70 25 98 %   09/08/20 0800 130/63  71 14 98 %   09/08/20 0700 134/69  76 (!) 36 97 %   09/08/20 0646     98 %   09/08/20 0600 128/60  75 15 97 %   09/08/20 0529 124/59  75 25 97 %   09/08/20 0512 140/70    96 % 20 0511   74 28 100 %   20 0509 140/70 99 °F (37.2 °C) 75 29 100 %   20 0500 122/68  83 (!) 33 100 %   20 0450 133/68 100.1 °F (37.8 °C) 79 27 100 %   20 0429 120/59  77 19 100 %   20 0415 131/60  71 14 100 %   20 0400 124/58  75 15 100 %   20 0347   73 17 100 %   20 0345 110/56  72 12 100 %   20 0329 110/56  70 23 100 %   20 0315 109/54  73 13 100 %   20 0300 114/57  70 13 100 %   20 0245 101/54  74 13 100 %   20 0229 96/54  76 13 100 %   20 0215 93/51  78 14 100 %     Temp (24hrs), Av.9 °F (37.2 °C), Min:97.6 °F (36.4 °C), Max:100.1 °F (37.8 °C)    No intake/output data recorded. Physical Exam:  Constitutional: Well developed, well nourished male in no acute distress, sitting comfortably in the hospital bed. HEENT: Normocephalic and atraumatic. Oropharynx is clear, mucous membranes are moist. Extraocular muscles are intact. Sclerae anicteric. Neck supple without JVD. No thyromegaly present. Lymph node   Deferred. Skin Warm and dry. No bruising and no rash noted. No erythema. No pallor. Respiratory +ET tube present. Lungs are coarse bilaterally, normal air exchange without accessory muscle use. CVS Normal rate, regular rhythm and normal S1 and S2. No murmurs, gallops, or rubs. Abdomen +NG tube. Soft, nontender and nondistended, normoactive bowel sounds. No palpable mass. No hepatosplenomegaly. Neuro +awake, following commands. Grossly nonfocal with no obvious sensory or motor deficits. MSK Normal range of motion in general.  No edema and no tenderness. Psych Appropriate mood and affect.         Labs:    Recent Results (from the past 24 hour(s))   HGB & HCT    Collection Time: 20 10:29 AM   Result Value Ref Range    HGB 7.4 (L) 13.6 - 17.2 g/dL    HCT 22.7 (L) 41.1 - 50.3 %   FIBRINOGEN    Collection Time: 20 10:29 AM   Result Value Ref Range    Fibrinogen 156 (L) 190 - 501 mg/dL   PLASMA, ALLOCATE    Collection Time: 09/07/20 10:45 AM   Result Value Ref Range    Unit number [id number]     Blood component type FP 24h, Thaw     Unit division 00     Status of unit TRANSFUSED     Unit number [id number]     Blood component type FP 24h,Thaw     Unit division B0     Status of unit TRANSFUSED     Unit number [id number]     Blood component type FP 24h, Thaw     Unit division 00     Status of unit TRANSFUSED     Unit number [id number]     Blood component type FP 24h, Thaw     Unit division 00     Status of unit TRANSFUSED     Unit number [id number]     Blood component type FP 24h, Thaw     Unit division 00     Status of unit ALLOCATED     Unit number [id number]     Blood component type FP 24h, Thaw     Unit division 00     Status of unit ALLOCATED    HGB & HCT    Collection Time: 09/07/20 11:32 AM   Result Value Ref Range    HGB 7.1 (L) 13.6 - 17.2 g/dL    HCT 21.4 (L) 41.1 - 50.3 %   GLUCOSE, POC    Collection Time: 09/07/20 12:20 PM   Result Value Ref Range    Glucose (POC) 184 (H) 65 - 100 mg/dL   CRYOPRECIPITATE, ALLOCATE    Collection Time: 09/07/20  1:30 PM   Result Value Ref Range    Unit number [id number]     Blood component type CRYO,5U Thaw     Unit division 00     Status of unit TRANSFUSED     Unit number [id number]     Blood component type CRYO,5U Thaw     Unit division 00     Status of unit TRANSFUSED     Unit number [id number]     Blood component type CRYO,5U Thaw     Unit division 00     Status of unit TRANSFUSED    PLATELETS, ALLOCATE    Collection Time: 09/07/20  2:05 PM   Result Value Ref Range    Unit number [id number]     Blood component type PLTPH LR 3     Unit division 00     Status of unit TRANSFUSED     Unit number [id number]     Blood component type PLTPH LR,1     Unit division 00     Status of unit REL FROM ALLOC    HGB & HCT    Collection Time: 09/07/20  2:35 PM   Result Value Ref Range    HGB 8.2 (L) 13.6 - 17.2 g/dL    HCT 24.7 (L) 41.1 - 50.3 %   FIBRINOGEN    Collection Time: 09/07/20  2:35 PM   Result Value Ref Range    Fibrinogen 267 190 - 501 mg/dL   POC CG8I    Collection Time: 09/07/20  2:35 PM   Result Value Ref Range    pH (POC) 7.31 (L) 7.35 - 7.45      pCO2 (POC) 41.2 35 - 45 MMHG    pO2 (POC) 338 (H) 75 - 100 MMHG    HCO3 (POC) 20.6 (L) 22 - 26 MMOL/L    sO2 (POC) 100 (H) 95 - 98 %    Base deficit (POC) 5 mmol/L    Sodium,  136 - 145 MMOL/L    Potassium, POC 4.3 3.5 - 5.1 MMOL/L    Glucose,  (H) 65 - 100 MG/DL    Calcium, ionized (POC) 1.00 (L) 1.12 - 1.32 mmol/L   POC CG8I    Collection Time: 09/07/20  3:31 PM   Result Value Ref Range    pH (POC) 7.30 (L) 7.35 - 7.45      pCO2 (POC) 50.0 (H) 35 - 45 MMHG    pO2 (POC) 375 (H) 75 - 100 MMHG    HCO3 (POC) 24.3 22 - 26 MMOL/L    sO2 (POC) 100 (H) 95 - 98 %    Base deficit (POC) 2 mmol/L    Sodium,  136 - 145 MMOL/L    Potassium, POC 4.4 3.5 - 5.1 MMOL/L    Glucose,  (H) 65 - 100 MG/DL    Calcium, ionized (POC) 1.29 1.12 - 1.32 mmol/L   PTT    Collection Time: 09/07/20  4:00 PM   Result Value Ref Range    aPTT 32.9 24.3 - 10.8 SEC   METABOLIC PANEL, BASIC    Collection Time: 09/07/20  4:00 PM   Result Value Ref Range    Sodium 146 (H) 136 - 145 mmol/L    Potassium 4.4 3.5 - 5.1 mmol/L    Chloride 117 (H) 98 - 107 mmol/L    CO2 24 21 - 32 mmol/L    Anion gap 5 (L) 7 - 16 mmol/L    Glucose 197 (H) 65 - 100 mg/dL    BUN 18 8 - 23 MG/DL    Creatinine 1.06 0.8 - 1.5 MG/DL    GFR est AA >60 >60 ml/min/1.73m2    GFR est non-AA >60 >60 ml/min/1.73m2    Calcium 7.4 (L) 8.3 - 10.4 MG/DL   PROTHROMBIN TIME + INR    Collection Time: 09/07/20  4:00 PM   Result Value Ref Range    Prothrombin time 17.7 (H) 12.0 - 14.7 sec    INR 1.4     CBC W/O DIFF    Collection Time: 09/07/20  4:00 PM   Result Value Ref Range    WBC 11.4 (H) 4.3 - 11.1 K/uL    RBC 3.30 (L) 4.23 - 5.6 M/uL    HGB 9.6 (L) 13.6 - 17.2 g/dL    HCT 28.1 (L) 41.1 - 50.3 %    MCV 85.2 79.6 - 97.8 FL MCH 29.1 26.1 - 32.9 PG    MCHC 34.2 31.4 - 35.0 g/dL    RDW 15.0 (H) 11.9 - 14.6 %    PLATELET 92 (L) 391 - 450 K/uL    MPV 10.8 9.4 - 12.3 FL    ABSOLUTE NRBC 0.03 0.0 - 0.2 K/uL   POC CG8I    Collection Time: 09/07/20  4:11 PM   Result Value Ref Range    pH (POC) 7.42 7.35 - 7.45      pCO2 (POC) 35.1 35 - 45 MMHG    pO2 (POC) 345 (H) 75 - 100 MMHG    HCO3 (POC) 22.9 22 - 26 MMOL/L    sO2 (POC) 100 (H) 95 - 98 %    Base deficit (POC) 1 mmol/L    Sodium,  136 - 145 MMOL/L    Potassium, POC 4.2 3.5 - 5.1 MMOL/L    Glucose,  (H) 65 - 100 MG/DL    Calcium, ionized (POC) 1.21 1.12 - 1.32 mmol/L   POC G3    Collection Time: 09/07/20  6:15 PM   Result Value Ref Range    Device: VENT      FIO2 (POC) 60 %    pH (POC) 7.33 (L) 7.35 - 7.45      pCO2 (POC) 41.5 35 - 45 MMHG    pO2 (POC) 202 (H) 75 - 100 MMHG    HCO3 (POC) 21.7 (L) 22 - 26 MMOL/L    sO2 (POC) 100 (H) 95 - 98 %    Base deficit (POC) 4 mmol/L    Mode Pressure regulated volume control      Tidal volume 500 ml    Set Rate 16 bpm    PEEP/CPAP (POC) 8 cmH2O    Allens test (POC) NOT APPLICABLE      Inspiratory Time 0.87 sec    Site DRAWN FROM ARTERIAL LINE      Specimen type (POC) ARTERIAL      Performed by Yves     CO2, POC 23 MMOL/L    Exhaled minute volume 10.60 L/min    COLLECT TIME 1,812     CBC WITH AUTOMATED DIFF    Collection Time: 09/07/20  8:02 PM   Result Value Ref Range    WBC 17.0 (H) 4.3 - 11.1 K/uL    RBC 4.29 4.23 - 5.6 M/uL    HGB 12.5 (L) 13.6 - 17.2 g/dL    HCT 36.4 (L) 41.1 - 50.3 %    MCV 84.8 79.6 - 97.8 FL    MCH 29.1 26.1 - 32.9 PG    MCHC 34.3 31.4 - 35.0 g/dL    RDW 15.1 (H) 11.9 - 14.6 %    PLATELET 083 (L) 560 - 450 K/uL    MPV 10.8 9.4 - 12.3 FL    ABSOLUTE NRBC 0.08 0.0 - 0.2 K/uL    NEUTROPHILS 77 43 - 78 %    LYMPHOCYTES 6 (L) 13 - 44 %    MONOCYTES 12 4.0 - 12.0 %    EOSINOPHILS 1 0.5 - 7.8 %    BASOPHILS 1 0.0 - 2.0 %    IMMATURE GRANULOCYTES 3 0.0 - 5.0 %    ABS. NEUTROPHILS 13.1 (H) 1.7 - 8.2 K/UL    ABS. LYMPHOCYTES 1.0 0.5 - 4.6 K/UL    ABS. MONOCYTES 2.0 (H) 0.1 - 1.3 K/UL    ABS. EOSINOPHILS 0.2 0.0 - 0.8 K/UL    ABS. BASOPHILS 0.2 0.0 - 0.2 K/UL    ABS. IMM. GRANS. 0.5 0.0 - 0.5 K/UL    RBC COMMENTS SLIGHT  ANISOCYTOSIS + POIKILOCYTOSIS        RBC COMMENTS SLIGHT  POLYCHROMASIA        RBC COMMENTS OCCASIONAL  MANAS CELLS        WBC COMMENTS Result Confirmed By Smear      PLATELET COMMENTS SLIGHT      DF AUTOMATED     METABOLIC PANEL, COMPREHENSIVE    Collection Time: 09/07/20  8:02 PM   Result Value Ref Range    Sodium 146 (H) 136 - 145 mmol/L    Potassium 4.4 3.5 - 5.1 mmol/L    Chloride 119 (H) 98 - 107 mmol/L    CO2 22 21 - 32 mmol/L    Anion gap 5 (L) 7 - 16 mmol/L    Glucose 168 (H) 65 - 100 mg/dL    BUN 19 8 - 23 MG/DL    Creatinine 1.19 0.8 - 1.5 MG/DL    GFR est AA >60 >60 ml/min/1.73m2    GFR est non-AA >60 >60 ml/min/1.73m2    Calcium 7.4 (L) 8.3 - 10.4 MG/DL    Bilirubin, total 0.7 0.2 - 1.1 MG/DL    ALT (SGPT) 27 12 - 65 U/L    AST (SGOT) 29 15 - 37 U/L    Alk.  phosphatase 41 (L) 50 - 136 U/L    Protein, total 4.1 (L) 6.3 - 8.2 g/dL    Albumin 1.9 (L) 3.2 - 4.6 g/dL    Globulin 2.2 (L) 2.3 - 3.5 g/dL    A-G Ratio 0.9 (L) 1.2 - 3.5     CBC W/O DIFF    Collection Time: 09/08/20  1:46 AM   Result Value Ref Range    WBC 14.9 (H) 4.3 - 11.1 K/uL    RBC 3.34 (L) 4.23 - 5.6 M/uL    HGB 9.7 (L) 13.6 - 17.2 g/dL    HCT 28.9 (L) 41.1 - 50.3 %    MCV 86.5 79.6 - 97.8 FL    MCH 29.0 26.1 - 32.9 PG    MCHC 33.6 31.4 - 35.0 g/dL    RDW 15.4 (H) 11.9 - 14.6 %    PLATELET 92 (L) 357 - 450 K/uL    MPV 10.9 9.4 - 12.3 FL    ABSOLUTE NRBC 0.09 0.0 - 0.2 K/uL   METABOLIC PANEL, BASIC    Collection Time: 09/08/20  3:44 AM   Result Value Ref Range    Sodium 147 (H) 136 - 145 mmol/L    Potassium 4.3 3.5 - 5.1 mmol/L    Chloride 117 (H) 98 - 107 mmol/L    CO2 21 21 - 32 mmol/L    Anion gap 9 7 - 16 mmol/L    Glucose 153 (H) 65 - 100 mg/dL    BUN 23 8 - 23 MG/DL    Creatinine 1.87 (H) 0.8 - 1.5 MG/DL    GFR est AA 47 (L) >60 ml/min/1.73m2    GFR est non-AA 39 (L) >60 ml/min/1.73m2    Calcium 7.2 (L) 8.3 - 10.4 MG/DL   POC G3    Collection Time: 09/08/20  3:46 AM   Result Value Ref Range    Device: VENT      FIO2 (POC) 40 %    pH (POC) 7.39 7.35 - 7.45      pCO2 (POC) 31.0 (L) 35 - 45 MMHG    pO2 (POC) 103 (H) 75 - 100 MMHG    HCO3 (POC) 18.9 (L) 22 - 26 MMOL/L    sO2 (POC) 98 95 - 98 %    Base deficit (POC) 5 mmol/L    Mode VENTILATOR/SPONTANEOUS/PRESSURE SUPPORT      PEEP/CPAP (POC) 8 cmH2O    Pressure support 6 cmH2O    Allens test (POC) NOT APPLICABLE      Total resp.  rate 21      Site DRAWN FROM ARTERIAL LINE      Specimen type (POC) ARTERIAL      Performed by [employer]     CO2, POC 20 MMOL/L    Respiratory comment: NurseNotified     Exhaled minute volume 10.10 L/min    COLLECT TIME 340         Imaging:      ASSESSMENT:  Problem List  Date Reviewed: 8/31/2020          Codes Class Noted    Hypovolemic shock ([facility] ICD-10-CM: R57.1  ICD-9-CM: 785.59  9/8/2020        Pancreatitis ICD-10-CM: K85.90  ICD-9-CM: 403.8  9/7/2020        Hematemesis ICD-10-CM: K92.0  ICD-9-CM: 578.0  9/2/2020        * (Principal) Acute blood loss anemia ICD-10-CM: D62  ICD-9-CM: 285.1  9/2/2020        Pancreatic cancer ([facility] ICD-10-CM: C25.9  ICD-9-CM: 157.9  9/2/2020        Hypotension ICD-10-CM: I95.9  ICD-9-CM: 458.9  9/2/2020        Duodenal mass ICD-10-CM: K31.89  ICD-9-CM: 537.89  9/2/2020        Iron deficiency anemia ICD-10-CM: D50.9  ICD-9-CM: 280.9  9/1/2020        Malignant neoplasm of head of pancreas ([facility] ICD-10-CM: C25.0  ICD-9-CM: 157.0  8/31/2020        Duodenal ulcer ICD-10-CM: K26.9  ICD-9-CM: 532.90  8/12/2020        Dietz's esophagus without dysplasia ICD-10-CM: K22.70  ICD-9-CM: 530.85  8/12/2020        Impaired fasting glucose ICD-10-CM: R73.01  ICD-9-CM: 790.21  5/8/2018        Essential hypertension ICD-10-CM: I10  ICD-9-CM: 401.9  5/8/2018        Mixed hyperlipidemia ICD-10-CM: E78.2  ICD-9-CM: 272.2  5/8/2018        PAC (premature atrial contraction) ICD-10-CM: I49.1  ICD-9-CM: 427.61  5/8/2018        Overweight (BMI 25.0-29. 9) ICD-10-CM: [id number]  ICD-9-CM: Delia Rausch  5/8/2018            60 male, recent diagnosis of pancreatic mass with duodenal ulcerated lesion (involving more than 3/4 of circumference) and hepatic metastasis, felt to likely represent metastatic pancreatic cancer, recently hospitalized with hematemesis from 9/2/20-9/6/20. He had required IR directed angiography with embolization of likely pancreatic mass that had eroded into duodenum. He had also received infed during admission. He was felt clinically stable for discharge, however now readmitted w BPR. On evaluation today, pt w rapid reponse being called as had multiple large blood bowel movements as well as single hematemesis, currently hypotensive. He is s/p 3 unit PRBC, w/ plans for FFP. Fibrinogen stable. 9/7 Case d/w hospitalist as well as surgery and rad-onc. Case also discussed w wife at bedside. Given hemorrhagic appearing nature of bleeding w hemodynamic instability, pt will likely need surgical intervention to help stop bleeding, w subsequent ICU monitoring. No current inpatient role for systemic chemotherapy. Aggressive blood transfusion support as needed. Guarded prognosis. We will follow along. 9/8 s/p Exlap 9/7 with resection of distal stomach and proximal duodenum, oversew of bleeding vessel in 2nd portion of duodenum, cholecystectomy, gastrojejunostomy; path pending. Remains intubated, hopeful for extubation today as he awakens and follows commands currently. Hgb stable at 9.7. Plts 92k. Coags yesterday were stable. Continue serial H&H q6 hrs. No signs of bleeding. Lab studies and imaging studies (CT) were personally reviewed. Pertinent old records were reviewed. Thank you for allowing us to participate in the care of Mr. Nance. We will continue to follow along. He will need to f/u with Dr Elizabeth Borden on discharge.            [facility] DANETTE Alvarado [address] Hematology & Oncology  [address] [place] [address]  Office : [phone]  Fax : [phone]           Attending Addendum:  I have personally performed a face to face diagnostic evaluation on this patient. I have reviewed and agree with the care plan as documented by Renee Iglesias N.P. My findings are as follows:  He has metastatic pancreatic cancer, appears intubated but awake, heart rate regular without murmurs, we will follow this patient for bleeding.               Mark Winn MD      [facility] Hematology/Oncology  [address] [address]  Office : [phone]  Fax : [phone]

## 2020-09-08 NOTE — PROGRESS NOTES
[facility] Progress Note         Admit Date:  9/7/2020    Today's Date:  9/8/2020    CC:  Hematemesis and hematochezia    Subjective:     Patient: Elaine Briggs is eyes and nods yes and no. He denies any abdominal pain, N&V. According to nursing, he has had no further bleeding to include hematochezia, melena, or hematemesis. He underwent exploratory laparotomy on 9/8/2020 with resection of distal stomach, proximal duodenum, over sew of bleeding vessel in 2nd portion of duodenum, cholecystectomy with  Exploration of common bile duct gastrojejunostomy and drain placement.     Medications:   Current Facility-Administered Medications   Medication Dose Route Frequency    0.9% sodium chloride infusion 250 mL  250 mL IntraVENous PRN    insulin lispro (HUMALOG) injection 0-10 Units  0-10 Units SubCUTAneous AC&HS    dextrose 40% (GLUTOSE) oral gel 1 Tube  15 g Oral PRN    glucagon (GLUCAGEN) injection 1 mg  1 mg IntraMUSCular PRN    dextrose (D50W) injection syrg 12.5-25 g  25-50 mL IntraVENous PRN    0.9% sodium chloride infusion 250 mL  250 mL IntraVENous PRN    ondansetron (ZOFRAN) injection 4 mg  4 mg IntraVENous Q6H PRN    promethazine (PHENERGAN) with saline injection 12.5 mg  12.5 mg IntraVENous Q6H PRN    pantoprazole (PROTONIX) 40 mg in 0.9% sodium chloride 10 mL injection  40 mg IntraVENous Q12H    octreotide (SANDOSTATIN) 500 mcg in 0.9% sodium chloride 500 mL infusion  50 mcg/hr IntraVENous CONTINUOUS    acetaminophen (TYLENOL) suppository 650 mg  650 mg Rectal Q4H PRN    diphenhydrAMINE (BENADRYL) capsule 25 mg  25 mg Oral Q6H PRN    0.9% sodium chloride infusion 250 mL  250 mL IntraVENous PRN    HYDROmorphone (PF) (DILAUDID) injection 0.5 mg  0.5 mg IntraVENous Q4H PRN    0.9% sodium chloride infusion 250 mL  250 mL IntraVENous PRN    NOREPINephrine (LEVOPHED) 4 mg in 5% dextrose 250 mL infusion  0.5-16 mcg/min IntraVENous TITRATE    0.9% sodium chloride infusion 250 mL  250 mL IntraVENous PRN    0.9% sodium chloride infusion 250 mL  250 mL IntraVENous PRN    0.9% sodium chloride infusion 250 mL  250 mL IntraVENous PRN    vasopressin (VASOSTRICT) 20 Units in 0.9% sodium chloride 100 mL infusion  0-0.1 Units/min IntraVENous TITRATE    HYDROmorphone (PF) (DILAUDID) injection 1 mg  1 mg IntraVENous Q2H PRN    piperacillin-tazobactam (ZOSYN) 4.5 g in 0.9% sodium chloride (MBP/ADV) 100 mL  4.5 g IntraVENous Q8H    fentaNYL in normal saline (pf) 25 mcg/mL infusion   mcg/hr IntraVENous TITRATE    lactated Ringers infusion  125 mL/hr IntraVENous CONTINUOUS       Review of Systems:  ROS was obtained, with pertinent positives as listed above. No chest pain or SOB.     Diet:  NPO    Objective:   Vitals:  Visit Vitals  /63 (BP 1 Location: Right arm, BP Patient Position: At rest)   Pulse 70   Temp 99.7 °F (37.6 °C)   Resp 25   Ht 5' 7\" (1.702 m)   Wt 70.3 kg (155 lb)   SpO2 98%   BMI 24.28 kg/m²     Intake/Output:  09/08 0701 - 09/08 1900  In: 30 [I.V.:30]  Out: -   09/06 1901 - 09/08 0700  In: 2479.7 [I.V.:3379.3]  Out: 4951 [Urine:380; Drains:260]  Exam:  General appearance: alert, cooperative, no distress ON VENT; SHAKES HEAD YES AND NO; NG IN PLACE WITH OLD BLOOD  Lungs: ON VENT; DECREASED  Heart: regular rate and rhythm  Abdomen: soft BANDAGE IN PLACE WITHOUT DRAINAGE,Bowel sounds HYPOACTIVE No masses, no organomegaly; BILATERAL GILDA DRAINS-LEFT WITH SEROSANGUINESS DRAINAGE; RIGHT WITH BILOUS DRAINAGE; CURRY IN PLACE  Extremities: extremities normal, atraumatic, no cyanosis or edema  Neuro:  alert and oriented    Data Review (Labs):    Recent Labs     09/08/20  0344 09/08/20  0146 09/07/20  2002 09/07/20  1600 09/07/20  1435 09/07/20  1132 09/07/20  1029 09/07/20  0532 09/07/20  0153 09/07/20  0046 09/06/20  0528 09/05/20 2055 09/05/20  1304   WBC  --  14.9* 17.0* 11.4*  --   --   --   --   --  16.6* 13.2*  --   --    HGB  --  9.7* 12.5* 9.6* 8.2* 7.1* 7.4* 7.5*  --  6.7* 8.6* 7. 6* 7.8*   HCT  --  28.9* 36.4* 28.1* 24.7* 21.4* 22.7* 23.3*  --  21.6* 27.0* 23.7* 24.5*   PLT  --  92* 107* 92*  --   --   --   --   --  185 172  --   --    MCV  --  86.5 84.8 85.2  --   --   --   --   --  92.3 89.4  --   --    *  --  146* 146*  --   --   --   --   --  140 140  --   --    K 4.3  --  4.4 4.4  --   --   --   --   --  3.9 3.3*  --   --    *  --  119* 117*  --   --   --   --   --  110* 108*  --   --    CO2 21  --  22 24  --   --   --   --   --  23 26  --   --    BUN 23  --  19 18  --   --   --   --   --  16 10  --   --    CREA 1.87*  --  1.19 1.06  --   --   --   --   --  0.92 0.93  --   --    CA 7.2*  --  7.4* 7.4*  --   --   --   --   --  7.7* 8.4  --   --    *  --  168* 197*  --   --   --   --   --  170* 111*  --   --    AP  --   --  41*  --   --   --   --   --   --  96  --   --   --    ALT  --   --  27  --   --   --   --   --   --  20  --   --   --    TBILI  --   --  0.7  --   --   --   --   --   --  0.5  --   --   --    ALB  --   --  1.9*  --   --   --   --   --   --  1.8*  --   --   --    TP  --   --  4.1*  --   --   --   --   --   --  4.6*  --   --   --    LPSE  --   --   --   --   --   --   --   --   --  1,204*  --   --   --    PTP  --   --   --  17.7*  --   --   --   --  20.3*  --   --   --   --    INR  --   --   --  1.4  --   --   --   --  1.7  --   --   --   --    APTT  --   --   --  32.9  --   --   --   --  33.7  --   --   --   --        Assessment:     Principal Problem:    Acute blood loss anemia (9/2/2020)    Active Problems:    Essential hypertension (5/8/2018)      Mixed hyperlipidemia (5/8/2018)      Dietz's esophagus without dysplasia (8/12/2020)      Malignant neoplasm of head of pancreas (HCC) (8/31/2020)      Iron deficiency anemia (9/1/2020)      Pancreatic cancer ([facility] [address] (9/2/2020)      Duodenal mass (9/2/2020)      Pancreatitis (9/7/2020)      Hypovolemic shock ([address] (9/8/2020)    59yo male with newly dx'd metastatic pancreatic cancer complicated by large penetrating ulcerated mass in the duodenum (oncology on board), presenting with hematemesis, melena, and ABLA concerning for UGIB 2/2 the ulcerated mass. Underwent IR embolization 9/3/2020 for GIB. Was d/c 9/6/2020, only to return to ED with hematochezia. He developed hematemesis on 9/7. He was started on PPI and Octreotide. He developed hypotension and rapid response was called. He continued to have profuse rectal bleeding and underwent exploratory laparotomy on 9/8/2020 with resection of distal stomach, proximal duodenum, over sew of bleeding vessel in 2nd portion of duodenum, cholecystectomy with exploration of common bile duct gastrojejunostomy and drain placement. He currently has no bleeding and his Hgb is 9.7. Plan:     -Currently no plans for endoscopic evaluation  -agree with Octreotide  -Plans for extubation today and followed by Pulmonology  -Oncology on board with plans for outpatient treatment  -continued supportive care by primary and surgical team  -We will sign off. Please call with any further questions. Je Howe.  Lorene Reyes in collaboration with Dr. Marilee Ibarra  [facility]

## 2020-09-08 NOTE — ADT AUTH CERT NOTES
Utilization Reviews  
 
   
Bowel Surgery: Small Intestine Resection - Care Day 2 (9/8/2020) by Heather Ellsworth RN  
 
   
Review Status  Review Entered Completed  9/8/2020 09:45   
   
Criteria Review Care Day: 1 Care Date: 9/8/2020 Level of Care:   
Guideline Day 2 Clinical Status   
(X) * Procedure completed 9/8/2020 09:45:30 EDT by Divina Bowles Yes see op  note   
(X) * Hemodynamic stability 9/8/2020 09:45:30 EDT by Anat Stauffer   
  100.1 P-75 R-29 140/70 100% on vent 30% FIO2 Activity ( ) * Progressive ambulation 9/8/2020 09:45:30 EDT by Divina Bowles In restraints on VENT Routes (X) IV fluids, medications 9/8/2020 09:45:30 EDT by Anat Stauffer   
  Fentanyl IV gtt, Dilaudid 1mg IV prn- given x2, LR @ 125, Levophed IV gtt, Sandostatin IV gtt, Zofran 4mg IV prn- given x2, Protonix 40mg Iv q12, Zosyn 4.5g IV q8 Interventions (X) Possible NG tube 9/8/2020 09:45:30 EDT by Anat Stauffer   
  NGT to suction   
(X) Hgb/Hct, electrolytes 9/8/2020 09:45:30 EDT by Anat Stauffer   
  WBC 14.9 Hgb 9.7 Hct 28.9 Na 147 Chloride 117 Glucose 153 Creat 1.87 Ca 7.2 * Milestone Additional Notes 9/8/20 9/8/2020 9:49 AM   
    
Subjective/HPI:   
This patient is a 60 y.o. seen and evaluated at the request of admitting service due to blood loss from a mass in duodenum. Pt came to hospital via EMS yesterday after an episode of dizziness and feeling diaphoretic at home.  Pt reports what sounds like some melena and small  Amount emesis but no lino hematemesis.  Currently he feels ok.  He denies any pain at present.   
    
Discussed with Dr Riddhi Saldivar who performed his last EGD and EUS.  The mass is described as being in duodenum just past the bulb and is necrotic, ulcerated in nature and filling approximately 3/4 of the lumen.  Previous attempt at embolization seems to have been temporarily successful.     
    
 Chemotherapy and radiation have not been started as of yet.     
    
9/8/2020  POD#1 s/p LAPAROTOMY EXPLORATORY, resection of distal stomach and proximal duodenum, over sew of bleeding vessel in 2nd portion of duodenum, cholecystectomy with exploration of common bile duct, gastrojejunostomy Drain placement x 2    
    
AF, soft BP, 30% vent-- plans to extubate today.  Mcintyre -380mL UOP/24h.  NGT with 100mL out.  Left GILDA with 90mL serosanguinous otuput; Hossein Bird with 170mL bilious output (drains around duodenal stump). WBC 14, H/H 9.7/28.  Cr 1.87. BS 200s.  On Fentanyl and Octreotide gtts. LFTs pending today.   
    
Review of Systems A comprehensive review of systems was negative. Past Medical History:   
Diagnosis Date · Cancer [facility])     
  pancreatic · GERD (gastroesophageal reflux disease)     
· Hernia     
    
Physical Exam:   
Gen- the patient is well developed and in no acute distress; awake on vent HEENT- PERRL, EOMI, no scleral icterus                 nose without alar flaring or epistaxis   
                oral muscosa moist without cyanosis + NGT Neck- no JVD or retractions Lungs- on vent @ 30% Heart- RRR Abd- dressings c/d/i; hypoactive BS.  Left drain with serosanguinous output; right with bilious output : mcintyre with oc urine Ext- warm without cyanosis. There is no lower leg edema. Skin- no  Rashes or cyanosis Neuro- awake, alert   
    
Assessment:   
    
Hospital Problems Date Reviewed: 8/31/2020   
  Codes Class Noted POA   
  Hypovolemic shock ([facility] ICD-10-CM: R57.1 ICD-9-CM: 785.59   9/8/2020 Unknown   
      
  Pancreatitis ICD-10-CM: K85.90 ICD-9-CM: 990.8   9/7/2020 Yes   
      
  * (Principal) Acute blood loss anemia ICD-10-CM: D62   
ICD-9-CM: 285. 1   9/2/2020 Unknown   
      
  Pancreatic cancer ([facility] ICD-10-CM: C25.9 ICD-9-CM: 157.9   9/2/2020 Yes   
      
  Duodenal mass ICD-10-CM: K31.89   
 ICD-9-CM: 537.89   9/2/2020 Yes   
      
  Iron deficiency anemia ICD-10-CM: D50.9 ICD-9-CM: 280. 9   9/1/2020 Yes   
      
  Malignant neoplasm of head of pancreas (HCC) ICD-10-CM: C25.0 ICD-9-CM: 157.0   8/31/2020 Yes   
      
  Dietz's esophagus without dysplasia ICD-10-CM: K22.70 ICD-9-CM: 530.85   8/12/2020 Yes   
      
  Essential hypertension ICD-10-CM: I10   
ICD-9-CM: 576. 9   5/8/2018 Yes   
      
  Mixed hyperlipidemia ICD-10-CM: D16.2 ICD-9-CM: 272.2   5/8/2018 Yes   
      
    
  
Plan:   
    
Vent management per pulmonary, plans to extubate Octreotide/Fentanyl gtts [employer] Hospitalist following for medical management SSI   
NGT to LIS   
NPO Drains to bulb suction Keep dressing Keep mcintyre Monitor labs, LFTs in AM   
On Zosyn LR @ 125mL/hr Prophylaxis with Protonix, SCDs.   
    
    
[place] , [place]   
    
IMAGES IMPRESSION:   
    
1. Mild bibasilar opacities, likely atelectasis. ET tube tip is in good   
position.   
    
2. No significant change compared to prior exam.   
  
  
   
Gastrointestinal Bleeding, Lower - Care Day 1 (9/7/2020) by Denise Jane RN  
 
   
Review Status  Review Entered Completed  9/8/2020 08:37   
   
Criteria Review Care Day: 1 Care Date: 9/7/2020 Level of Care:   
Guideline Day 1 Level Of Care (X) ICU [E] or floor 9/8/2020 08:37:16 EDT by Ivar Hammans to ICU Clinical Status   
(X) * Clinical Indications met [F]   
9/8/2020 08:37:16 EDT by Maria M Borden Yes Activity ( ) Activity as tolerated 9/8/2020 08:37:16 EDT by Courtney Stanley   
  Oncology consult, GI consult, Surgeru consult, SCD, APOLINAR, VS   
Routes   
(X) NPO   
9/8/2020 08:37:16 EDT by Courtney Stanley   
  NPO   
(X) IV fluids, medications 9/8/2020 08:37:16 EDT by Courtney Stanley   
  LR @ 125, Fentanyl IV gtt, Dilaudid 1mg IV prn- given x2, Levophed IV gtt, Zofran 4mg IV prn- given x2, Protonix 40mg IV q12, Zosyn 4.5g IV q8 Interventions   
(X) Hgb/Hct   
9/8/2020 08:37:16 EDT by Pratibha Norman   
  LABS- WBC 16.6 Hgb 6.7 Hct 21.6 Glucose 170 Ca 7.7 Lipase 1204 (X) Large-bore or central IV access 9/8/2020 08:37:16 EDT by Marquez Platt Yes, NGT to suction (X) Monitor vital signs and blood counts closely 9/8/2020 08:37:16 EDT by Marquez Platt Yes   
(X) Transfusion as necessary 9/8/2020 08:37:16 EDT by Pratibha Norman   
  3 units PRBC, i unit plts, 3 units cryoprecipitate * Milestone Additional Notes ED NOTE Patient was discharged from the hospital earlier today.  Note follows. Hospital Course:   
Please refer to the admission H&P for details of presentation. In summary, the patient is 65M with pmhx of adenocarcinoma of head of pancreas with liver mets, s/p EUS on 8/25 that showed large ulcerated duodenal mass extending to duodenal bulb with stricture distally, brought to ER via EMS with report of dark tarry stoos and hematemesis. Admitted to hospitalist service and started on IV PPI, octreotide gtt. GI, Onc and IR consulted.    
On 9/3 had mesenteric arteriogram w/ coil emoblization by IR.  Received 2 units prbc on 9/4, 1 unit 9/5 to keep hgb >8 as per GI recs. Reports LLE cramping that has resolved with walking this morning (suspect related to hypokalemia). Has had brown stool on morning of discharge.   
    
Dates earlier this evening had a bloody bowel movement dark red in color.  Some upper abdominal pain afterwards.  Nausea present.  No vomiting.  No dysuria hematuria. The history is provided by the patient. No  was used. Rectal Bleeding     
This is a new problem. The current episode started 1 to 2 hours ago. The stool is described as bloody coated. Associated symptoms include abdominal pain and nausea.  Pertinent negatives include no dysuria, no abdominal distention, no chills, no fever, no back pain, no vomiting and no diarrhea. Risk factors include a recent illness. He has tried nothing for the symptoms.   
    
MDM Number of Diagnoses or Management Options Diagnosis management comments: Patient was discharged from the hospital today.  Has recurrent lower GI bleed with a drop in hemoglobin causing some anemia and weakness.  Also with some upper abdominal pain.  Will transfuse 1 unit of blood and admit to the hospital.   
    
H&P Subjective:   
    
Cc:\" I have blood in my stools\"   
    
Najma López is a 60 y.o. 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 The patient was started on protonix and octreotide gtt. He received a total of 4 PRBc blood transfusion. GI, IR and oncology consulted. He underwent Mesenteric arteriogram + coil embolization on 9/3. Mallissa Chess was placed and he is due to chemotherapy on . His clinical status was stable and he was ultimately discharged. Last night; he noted dark red blood in the stools; associated with abdominal cramping and burping.   
    
Upon ed arrival VS were stable. Labs: hb 6.7gr ( upon discharged hb was 8.6 gr ). Wbc 16k Lipase > 1000 Cxr: unrevealing   
    
FOBT: positive   
    
Type and screen taken. Dr. Raymundo Ornelas ordered an abdomen and pelvis CT scan.   
    
The patient will be admitted for acute blood loss anemia and IR evaluation will be ordered.   
    
ROS: as above Social hx: former smoker Family hx: his father  of leukemia.   
    
Past Medical History:   
Diagnosis Date · Cancer [facility])     
  pancreatic · GERD (gastroesophageal reflux disease)     
· Hernia Physical Exam: General: Alert, cooperative, no distress, appears stated age. Eyes: Conjunctivae/corneas clear. PERRL, EOMs intact. Fundi benign Ears: Normal TMs and external ear canals both ears. Nose: Nares normal. Septum midline. Mucosa normal. No drainage or sinus tenderness. Mouth/Throat: Lips, mucosa, and tongue normal. Teeth and gums normal.   
Neck: Supple, symmetrical, trachea midline, no adenopathy, thyroid: no enlargment/tenderness/nodules, no carotid bruit and no JVD. Back:   Symmetric, no curvature. ROM normal. No CVA tenderness. Lungs:   Clear to auscultation bilaterally. Heart: Regular rate and rhythm, S1, S2 normal, no murmur, click, rub or gallop. Abdomen:   Soft, non-tender. Bowel sounds normal. No masses,  No organomegaly. Extremities: Extremities normal, atraumatic, no cyanosis or edema. Pulses: 2+ and symmetric all extremities. Skin: Pale, turgor normal. No rashes or lesions Lymph nodes: Cervical, supraclavicular, and axillary nodes normal.   
Neurologic: CNII-XII intact. Normal strength, sensation and reflexes throughout.   
    
    
Assessment:   
    
Active Hospital Problems   
  Diagnosis Date Noted · Pancreatitis 09/07/2020 · Acute blood loss anemia 09/02/2020 · Pancreatic cancer ([facility] [address] 09/02/2020 · Duodenal mass 09/02/2020 · Iron deficiency anemia 09/01/2020 · Malignant neoplasm of head of pancreas ([facility] [address] 08/31/2020 · Dietz's esophagus without dysplasia 08/12/2020 · Mixed hyperlipidemia 05/08/2018 · Essential hypertension 05/08/2018 Plan:   
    
-Acute blood loss anemia: etiology likely recurrent bleeding from ulcerated duodenal mass. FOBT was positive in the ED. No signs of hematemesis or hemodynamic instability Admit as inpatient Keep NPO   
IVFs Continue IV ppi bid and octreotide gtt Antiemetics Monitor abdomen and pelvis ct scan Serial HH q 6hrs and transfuse prn Transfuse 2 Prbc tonight IR consult in am to evaluate embolization   
    
-Abdominal pain, elevated lipase:   
Pancreatitis Keep npo, ivfs, conservative management Pain control   
    
-Adenocarcinoma of the pancreas with liver mets:   
Patient has medi-port. Plan to start chemotherapy on 9/11 Will follow with oncology as outpatient   
    
-HTN: hold medications   
    
DVT ppx: compression stockings Code status: full Estimated LOS > 2MN Risk: high Estimated DC planning: home Goals of care discussed with the patient and his son over the phone. OP-NOTE Brief Postoperative Note   
    
Patient: Nasreen Osorio YOB: 1955 MRN: [id number]   
    
Date of Procedure: 9/7/2020   
    
Pre-Op Diagnosis: BLEEDING DUODENAL MASS   
    
Post-Op Diagnosis: active arterial bleeding duodenum, mass involving first and 2nd portion of duodenum, head of pancreas, multiple liver masses in right and left lobe   
    
Procedure(s): LAPAROTOMY EXPLORATORY, resection of distal stomach and proximal duodenum, over sew of bleeding vessel in 2nd portion of duodenum, cholecystectomy with exploration of common bile duct, gastrojejunostomy Drain placement x 2   
    
Surgeon(s):   
Jennie Aguirre MD   
    
Surgical Assistant: None   
    
Anesthesia: General   
    
Estimated Blood Loss (mL): 224   
    
Complications: Bleeding VS-98.4 P-110, 110, 101, 109, 128, 118, 106, 110, 110, 118 R-19 54/31 67/32 85/36 66/42 80/45 89/47 79/51 83/45 96% - spanning over 12hrs IMAGES IMPRESSION:   
    
1. Mild bibasilar opacities, likely atelectasis. ET tube tip is in good   
position.   
    
2. No significant change compared to prior exam.   
    
    
  
   
Gastrointestinal Bleeding, Lower - Clinical Indications for Admission to Inpatient Care by Cyn Trejo RN  
 
   
Review Status  Review Entered Completed  9/8/2020 08:25   
   
Criteria Review Clinical Indications for Admission to Inpatient Care Most Recent : José Miguel Cherry Most Recent Date: 9/8/2020 08:25:20 EDT   
(X) Admission is indicated for  1 or more  of the following  (1) (2) (3) (4) (5) (6):   
   (X) Hemodynamic instability   
   9/8/2020 08:25:20 EDT by José Miguel Cherry   
     VS-98.4 P-110, 110, 101, 109, 128, 118, 106, 110, 110, 118 R-19 54/31 67/32 85/36 66/42 80/45 89/47 79/51 83/45 96% - spanning over 12hrs

## 2020-09-08 NOTE — PROGRESS NOTES
Palliative Care Progress Note    Patient: Ava Parekh MRN: [id number]  SSN: [id number]    YOB: 1955  Age: 60 y.o. Sex: male       Assessment/Plan:     Chief Complaint/Interval History: Large blood loss per rectum led to surgery     Principal Diagnosis:    · Failure to Thrive  R62.7    Additional Diagnoses:   · Pain, abdomen  R10.9  · Encounter for Palliative Care  Z51.5    Palliative Performance Scale (PPS)  PPS: 10    Medical Decision Making:   Reviewed and summarized notes from last 24 hours. Discussed case with appropriate providers: ICU IDT rounds, GIOVANNI Shukla  Reviewed lab and X-ray data from last 24 hours. Sudden large blood loss from rectum yesterday led to resection of distal stomach and proximal duodenum, over sew of bleeding vessel in 2nd portion of duodenum, cholecystectomy with exploration of common bile duct, gastrojejunostomy, and placement of 2 drains. The pt is now in the ICU, intubated and on pressure support. NGT tube is suctioning deep red fluid. He is alert and nods appropriately. He denies pain and nausea. He confirms that he continues to want aggressive care. His wife is at bedside. She has no questions at this time. Encouraged her to ask nurse if any questions or concerns arise. Will continue to follow. Will discuss findings with members of the interdisciplinary team.         More than 50% of this 15 minute visit was spent counseling and coordination of care as outlined above. Subjective:     Review of Systems:  Review of systems not obtained due to patient factors: intubated, although alert     Objective:     Visit Vitals  /63 (BP 1 Location: Right arm, BP Patient Position: At rest)   Pulse 70   Temp 99.7 °F (37.6 °C)   Resp 25   Ht 5' 7\" (1.702 m)   Wt 155 lb (70.3 kg)   SpO2 98%   BMI 24.28 kg/m²       Physical Exam:    General:  Cooperative. No acute distress.    Eyes:  Conjunctivae/corneas clear    Nose: Nares normal. Septum midline. Neck: Supple, symmetrical, trachea midline, no JVD   Lungs:   Clear to auscultation bilaterally, unlabored   Heart:  Regular rate and rhythm, no murmur    Abdomen:   Soft, non-tender, non-distended   Extremities: Normal, atraumatic, no cyanosis or edema   Skin: Skin color, texture, turgor normal. No rash or lesions.    Neurologic: Nonfocal   Psych: Alert, nods appropriately     Signed By: Weston Dalton NP     September 8, 2020

## 2020-09-08 NOTE — PROGRESS NOTES
A follow up visit was made to the patient. Emotional support, spiritual presence and   prayer were provided for the patient and his wife. He had major surgery yesterday. Gaudencio Romero, [address]

## 2020-09-08 NOTE — ACP (ADVANCE CARE PLANNING)
ACP note: Palliative care has spoke with pt. No LW/HCPOA on file. Wife is legal decision maker if pt can not make decisions for self.  [phone]

## 2020-09-08 NOTE — PROGRESS NOTES
Chart reviewed and pt seen in ICU s/p re-admission acute blood loss. Alert and oriented, wife at bedside. Confirms demographics. Discussed that he felt like he needed Oxygen at d/c. Explained insurance guidelines for home O2. Aware CM to follow for d/c needs POC. Again, we will order O2 if MD orders and any other equipment. Would benefit from [facility] to follow at d/c if MD feels appropriate. Readmission assessment completed. ACP note. Care Management Interventions  PCP Verified by CM: Yes  Mode of Transport at Discharge:  Other (see comment)  Transition of Care Consult (CM Consult): Discharge Planning  Discharge Durable Medical Equipment: (none currently)  Current Support Network: Lives with Spouse(lives with wife, Ynes Otto [phone])  Confirm Follow Up Transport: Family  The Plan for Transition of Care is Related to the Following Treatment Goals : [facility]  Name of the Patient Representative Who was Provided with a Choice of Provider and Agrees with the Discharge Plan: wife and pt  Freedom of Choice List was Provided with Basic Dialogue that Supports the Patient's Individualized Plan of Care/Goals, Treatment Preferences and Shares the Quality Data Associated with the Providers?: Yes  [employer] Information Provided?: [facility])  Discharge Location  Discharge Placement: Unable to determine at this time

## 2020-09-08 NOTE — PROGRESS NOTES
[address] [facility]  [phone]     History and Physical/Surgical Consult   Sybil Montenegro    Admit date: 2020    MRN: [id number]     : 1955     Age: 60 y.o.          2020 9:49 AM    Subjective/HPI:   This patient is a 60 y.o. seen and evaluated at the request of admitting service due to blood loss from a mass in duodenum. Pt came to hospital via EMS yesterday after an episode of dizziness and feeling diaphoretic at home. Pt reports what sounds like some melena and small  Amount emesis but no lino hematemesis. Currently he feels ok. He denies any pain at present. Discussed with Dr Pablo Hernandez who performed his last EGD and EUS. The mass is described as being in duodenum just past the bulb and is necrotic, ulcerated in nature and filling approximately 3/4 of the lumen. Previous attempt at embolization seems to have been temporarily successful. Chemotherapy and radiation have not been started as of yet. 2020  POD#1 s/p LAPAROTOMY EXPLORATORY, resection of distal stomach and proximal duodenum, over sew of bleeding vessel in 2nd portion of duodenum, cholecystectomy with exploration of common bile duct, gastrojejunostomy   Drain placement x 2     AF, soft BP, 30% vent-- plans to extubate today. Soriano -380mL UOP/24h. NGT with 100mL out. Left GILDA with 90mL serosanguinous otuput;  right GILDA with 170mL bilious output (drains around duodenal stump). WBC 14, H/H 9.7/28. Cr 1.87. BS 200s. On Fentanyl and Octreotide gtts. LFTs pending today. Review of Systems  A comprehensive review of systems was negative.   Past Medical History:   Diagnosis Date    Cancer [facility])     pancreatic    GERD (gastroesophageal reflux disease)     Hernia       Past Surgical History:   Procedure Laterality Date    HX CATARACT REMOVAL Left 12/15/2017    HX HERNIA REPAIR      HX ORTHOPAEDIC Right     tibia- with hardware    IR INSERT TUNL CVC W PORT OVER 5 YEARS  2020    IR OCCL TXCATH HEMMORAGE W SI  9/3/2020      Allergies   Allergen Reactions    Tetanus And Diphtheria Toxoids Swelling     As child      Social History     Tobacco Use    Smoking status: Former Smoker     Packs/day: 1.00     Years: 10.00     Pack years: 10.00     Last attempt to quit: 2006     Years since quittin.6    Smokeless tobacco: Never Used   Substance Use Topics    Alcohol use: Not Currently      Social History     Social History Narrative    Not on file     Family History   Problem Relation Age of Onset    No Known Problems Mother     Cancer Father 72         leukemia    Diabetes Sister     No Known Problems Brother     No Known Problems Sister     Cancer Sister       Prior to Admission Medications   Prescriptions Last Dose Informant Patient Reported? Taking? cholecalciferol (VITAMIN D3) 1,000 unit tablet   Yes No   Sig: Take 1,000 Units by mouth daily. lidocaine-prilocaine (EMLA) topical cream   No No   Sig: Apply to port about 45 minutes prior to access   ondansetron hcl (Zofran) 8 mg tablet   No No   Sig: Take 1 Tab by mouth every eight (8) hours as needed for Nausea. Indications: prevent nausea and vomiting from cancer chemotherapy   pantoprazole (PROTONIX) 40 mg tablet   Yes No   Sig: Take 40 mg by mouth two (2) times a day. prochlorperazine (Compazine) 10 mg tablet   No No   Sig: Take 1 Tab by mouth every six (6) hours as needed for Nausea. Indications: prevent nausea and vomiting from cancer chemotherapy   traMADoL (ULTRAM-ER) 100 mg Tb24   Yes No   vit B Cmplx 3-FA-Vit C-Biotin (NEPHRO VERÓNICA RX) 1- mg-mg-mcg tablet   Yes No   Sig: Take 1 Tab by mouth daily.       Facility-Administered Medications: None     Current Facility-Administered Medications   Medication Dose Route Frequency    0.9% sodium chloride infusion 250 mL  250 mL IntraVENous PRN    0.9% sodium chloride infusion 250 mL  250 mL IntraVENous PRN    ondansetron (ZOFRAN) injection 4 mg  4 mg IntraVENous Q6H PRN    promethazine (PHENERGAN) with saline injection 12.5 mg  12.5 mg IntraVENous Q6H PRN    pantoprazole (PROTONIX) 40 mg in 0.9% sodium chloride 10 mL injection  40 mg IntraVENous Q12H    octreotide (SANDOSTATIN) 500 mcg in 0.9% sodium chloride 500 mL infusion  50 mcg/hr IntraVENous CONTINUOUS    acetaminophen (TYLENOL) suppository 650 mg  650 mg Rectal Q4H PRN    diphenhydrAMINE (BENADRYL) capsule 25 mg  25 mg Oral Q6H PRN    0.9% sodium chloride infusion 250 mL  250 mL IntraVENous PRN    HYDROmorphone (PF) (DILAUDID) injection 0.5 mg  0.5 mg IntraVENous Q4H PRN    0.9% sodium chloride infusion 250 mL  250 mL IntraVENous PRN    NOREPINephrine (LEVOPHED) 4 mg in 5% dextrose 250 mL infusion  0.5-16 mcg/min IntraVENous TITRATE    0.9% sodium chloride infusion 250 mL  250 mL IntraVENous PRN    0.9% sodium chloride infusion 250 mL  250 mL IntraVENous PRN    0.9% sodium chloride infusion 250 mL  250 mL IntraVENous PRN    vasopressin (VASOSTRICT) 20 Units in 0.9% sodium chloride 100 mL infusion  0-0.1 Units/min IntraVENous TITRATE    HYDROmorphone (PF) (DILAUDID) injection 1 mg  1 mg IntraVENous Q2H PRN    piperacillin-tazobactam (ZOSYN) 4.5 g in 0.9% sodium chloride (MBP/ADV) 100 mL  4.5 g IntraVENous Q8H    fentaNYL in normal saline (pf) 25 mcg/mL infusion   mcg/hr IntraVENous TITRATE    lactated Ringers infusion  125 mL/hr IntraVENous CONTINUOUS     Objective:     Vitals:    09/08/20 0646 09/08/20 0700 09/08/20 0800 09/08/20 0830   BP:  134/69 130/63    Pulse:  76 71 70   Resp:  (!) 36 14 25   Temp:       SpO2: 98% 97% 98% 98%   Weight:       Height:         No intake/output data recorded.   09/06 1901 - 09/08 0700  In: 6087.2 [I.V.:3379.3]  Out: 8193 [Urine:380; Drains:260]  Physical Exam:   Gen- the patient is well developed and in no acute distress; awake on vent  HEENT- PERRL, EOMI, no scleral icterus       nose without alar flaring or epistaxis                  oral muscosa moist without cyanosis  + NGT  Neck- no JVD or retractions  Lungs- on vent @ 30%  Heart- RRR   Abd- dressings c/d/i; hypoactive BS. Left drain with serosanguinous output; right with bilious output   : mcintyre with oc urine  Ext- warm without cyanosis. There is no lower leg edema. Skin- no  Rashes or cyanosis  Neuro- awake, alert       CT Results (most recent):  Results from Hospital Encounter encounter on 09/07/20   CT ABD PELV W CONT    Narrative EXAM: CT abdomen and pelvis with IV contrast.    INDICATION: Abdominal pain. Pancreatic cancer. COMPARISON: Prior CT abdomen and pelvis on August 17, 2020. TECHNIQUE: Axial CT images of the abdomen and pelvis were obtained after the  intravenous injection of 100 mL Isovue 370 CT contrast. Radiation dose reduction  techniques were used for this study. Our CT scanners use one or all of the  following:  Automated exposure control, adjustment of the mA or kV according to  patient size, iterative reconstruction. FINDINGS:    - Liver: Although a 4.8 cm cyst in the liver is unchanged, other numerous  smaller hypodense masses throughout both liver lobes have probably progressed in  size and number.  - Gallbladder and bile ducts: New density in the gallbladder lumen likely  relates to excreted contrast. There is no evidence of acute cholecystitis or  biliary tract dilatation. The portal vein is patent. - Spleen: Within normal limits. - Urinary tract: Again noted are left parapelvic kidney cyst. A few tiny right  kidney cysts are unchanged. The urinary bladder is within normal limits. - Adrenals: Within normal limits. - Pancreas: No significant change in the previously described approximate 4 cm  pancreatic head mass. No acute peripancreatic inflammation or ductal dilatation  is seen. There are new clips or fiducial markers adjacent to the pancreatic  head. - Gastrointestinal tract: Within normal limits.   - Retroperitoneum: Mild to moderate abdominal aorta atherosclerosis. - Peritoneal cavity and abdominal wall: No ascites or free intraperitoneal air.  - Pelvis: Within normal limits. - Spine/bones: No acute process. - Other comments: None. Impression IMPRESSION:   1. Stable pancreatic head mass. 2. Progressed widespread liver metastases.          Data Review   Recent Labs     09/08/20  0146 09/07/20 2002 09/07/20  1600   WBC 14.9* 17.0* 11.4*   HGB 9.7* 12.5* 9.6*   HCT 28.9* 36.4* 28.1*   PLT 92* 107* 92*     Recent Labs     09/08/20  0344 09/07/20 2002 09/07/20  1600 09/07/20  0153   * 146* 146*  --    K 4.3 4.4 4.4  --    * 119* 117*  --    CO2 21 22 24  --    * 168* 197*  --    BUN 23 19 18  --    CREA 1.87* 1.19 1.06  --    INR  --   --  1.4 1.7       Assessment:     Hospital Problems  Date Reviewed: 8/31/2020          Codes Class Noted POA    Hypovolemic shock ([facility] ICD-10-CM: R57.1  ICD-9-CM: 785.59  9/8/2020 Unknown        Pancreatitis ICD-10-CM: K85.90  ICD-9-CM: 218.4  9/7/2020 Yes        * (Principal) Acute blood loss anemia ICD-10-CM: D62  ICD-9-CM: 285.1  9/2/2020 Unknown        Pancreatic cancer ([facility] ICD-10-CM: C25.9  ICD-9-CM: 157.9  9/2/2020 Yes        Duodenal mass ICD-10-CM: K31.89  ICD-9-CM: 537.89  9/2/2020 Yes        Iron deficiency anemia ICD-10-CM: D50.9  ICD-9-CM: 280.9  9/1/2020 Yes        Malignant neoplasm of head of pancreas ([facility] ICD-10-CM: C25.0  ICD-9-CM: 157.0  8/31/2020 Yes        Dietz's esophagus without dysplasia ICD-10-CM: K22.70  ICD-9-CM: 530.85  8/12/2020 Yes        Essential hypertension ICD-10-CM: I10  ICD-9-CM: 401.9  5/8/2018 Yes        Mixed hyperlipidemia ICD-10-CM: E78.2  ICD-9-CM: 272.2  5/8/2018 Yes            Plan:     Vent management per pulmonary, plans to extubate  Octreotide/Fentanyl gtts  Off Pressors  Hospitalist following for medical management  SSI  NGT to LIS  NPO  Drains to bulb suction  Keep dressing  Keep mcintyre  Monitor labs, LFTs in AM  On Zosyn  LR @ 125mL/hr  Prophylaxis with Protonix, SCDs.        Loren Chew, PA

## 2020-09-08 NOTE — PROGRESS NOTES
Ventilator check complete; patient has a #8. 0 ET tube secured at the 25 at the lip. Patient is not sedated. Patient is able to follow commands. Breath sounds are diminished. Trachea is midline, Negative for subcutaneous air, and chest excursion is symmetric. Patient is also Negative for cyanosis and is Negative for pitting edema. All alarms are set and audible. Resuscitation bag is at the head of the bed. Ventilator Settings  Mode FIO2 Rate Tidal Volume Pressure PEEP I:E Ratio   Pressure support  30 %      6 cm H2O  8 cm H20  1:2.5      Peak airway pressure: 15.1 cm H2O   Minute ventilation: 10.6 l/min     ABG: No results for input(s): PH, PCO2, PO2, HCO3 in the last 72 hours.       Yancy Luciano, RT

## 2020-09-08 NOTE — PROGRESS NOTES
Respiratory Mechanics completed and are as follows:  Weaning Parameters  Spontaneous Breathing Trial Complete: Yes  Resp Rate Observed: 20  Ve: 15  VT: 480  RSBI: 26  NIF: -20  VC: 981  Henao Agitation Sedation Scale (RASS): Alert and calm  Patient extubated to a 4L NC. Patient is able to communicate and is negative for stridor. Breath sounds are diminished. No complications with extubation.      Wyatt Lomeli, RT

## 2020-09-08 NOTE — PROGRESS NOTES
Hospitalist Note     Admit Date:  2020 12:31 AM   Name:  Marcellus Cardona   Age:  60 y.o.  :  1955   MRN:  [id number]   PCP:  Aleta Townsend MD  Treatment Team: Attending Provider: Adeel Garner MD; Consulting Provider: Caron Camarena MD; Consulting Provider: Apoorva Lutz NP; Consulting Provider: Adeel Garner MD; Utilization Review: Carlotta Sanchez; Consulting Provider: Kimmy Borden MD; Primary Nurse: Elba Candelario, RN; Care Manager: Ruby George, RN; Staff Nurse: Issac Gunter, RN; Utilization Review: Carolyn Paz, GIOVANNI; Consulting Provider: Macario Sanchez MD    HPI/Subjective:   Mr. 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 He was admitted from - with acute blood loss anemia 2/2 duodenal tumor bleeding. Transfused 4U RBCs. GI, IR consulted. Had mesenteric arteriogram with coil embolization on 9/3. Port placed and planned to start chemotherapy on . He developed dark red blood in his stools on the night of  (after discharge). He came back to the ER. WBCs 16K, Hb 6.7 (was 8.6 on discharge), positive FOBT. Early on the morning of admission he developed vomiting, diaphoresis and profuse/brisk rectal bleeding. He was given 7 units of emergent release blood, cryo, FFP. Case was discussed with numerous specialties and eventually the patient went emergently to the OR where he had ex-lap with resection of distal stomach, proximal duodenum, oversew of bleeding duodenal vessel (2nd portion), CCY, CBD exploration and gastrojejunosotomy, drain placement x2. Admitted to ICU post-operatively. : Not currently on pressors. He is awake and following commands, moving all extremities. Remains intubated but on PS. VS are all normal. Cr up from admission. Hb 9.7 today. Shakes head when asked if he's in any pain.     No other complaints  Objective:     Patient Vitals for the past 24 hrs:   Temp Pulse Resp BP SpO2   09/08/20 0830  70 25  98 %   09/08/20 0646     98 %   09/08/20 0600  75 15 128/60 97 %   09/08/20 0529  75 25 124/59 97 %   09/08/20 0512    140/70 96 %   09/08/20 0511  74 28  100 %   09/08/20 0509 99 °F (37.2 °C) 75 29 140/70 100 %   09/08/20 0500  83 (!) 33 122/68 100 %   09/08/20 0450 100.1 °F (37.8 °C) 79 27 133/68 100 %   09/08/20 0429  77 19 120/59 100 %   09/08/20 0415  71 14 131/60 100 %   09/08/20 0400  75 15 124/58 100 %   09/08/20 0347  73 17  100 %   09/08/20 0345  72 12 110/56 100 %   09/08/20 0329  70 23 110/56 100 %   09/08/20 0315  73 13 109/54 100 %   09/08/20 0300  70 13 114/57 100 %   09/08/20 0245  74 13 101/54 100 %   09/08/20 0229  76 13 96/54 100 %   09/08/20 0215  78 14 93/51 100 %   09/08/20 0200  77 14 96/51 100 %   09/08/20 0145  90 (!) 33 110/55 100 %   09/08/20 0129  83 13 94/50 100 %   09/08/20 0115  84 20 (!) 89/52 100 %   09/08/20 0102  84 22 (!) 84/45 100 %   09/08/20 0045  93 15 (!) 83/52 100 %   09/08/20 0030 99.6 °F (37.6 °C) 91 16 (!) 87/52 100 %   09/08/20 0018  89 20 (!) 82/52 100 %   09/08/20 0005  92 12 (!) 85/46 100 %   09/07/20 2346  96 16 (!) 86/51 100 %   09/07/20 2331  100 16 (!) 85/54 100 %   09/07/20 2300  100 17 99/61 100 %   09/07/20 2245  100 17  100 %   09/07/20 2230  100 16  100 %   09/07/20 2227  99 13 99/64 100 %   09/07/20 2215  97 24  100 %   09/07/20 2200  94 18 104/62 100 %   09/07/20 2145  93 18  100 %   09/07/20 2130  90 16  100 %   09/07/20 2115  90 16  100 %   09/07/20 2100  89 15 112/68 100 %   09/07/20 2045  91 16  100 %   09/07/20 2030  88 12  100 %   09/07/20 2015  94 24  93 %   09/07/20 2012  83 18  (!) 81 %   09/07/20 2009  81 19  99 %   09/07/20 2000  84 22 138/73 100 %   09/07/20 1945  82 22  100 %   09/07/20 1930 98.9 °F (37.2 °C) 78 22 122/64 99 %   09/07/20 1915  76 20  100 %   09/07/20 1908  78 (!) 37 110/58 98 %   09/07/20 1900  81 21  100 %   09/07/20 1745  90 16  100 %   09/07/20 1734 97.6 °F (36.4 °C) (!) 118 16 190/77 100 %   09/07/20 1335  (!) 110  106/55 100 %   09/07/20 1331  93      09/07/20 1327  100   98 %   09/07/20 1325  95   100 %   09/07/20 1323  (!) 110  106/55 100 %   09/07/20 1321  97   100 %   09/07/20 1320  93  (!) 83/45 94 %   09/07/20 1316  (!) 102  (!) 79/51    09/07/20 1312  (!) 106  92/53    09/07/20 1303  95 18 97/51 97 %   09/07/20 1257  95  (!) 89/47 100 %   09/07/20 1243  (!) 118  (!) 80/45 100 %   09/07/20 1242  (!) 128  (!) 66/42 100 %   09/07/20 1236    (!) 85/36    09/07/20 1232  (!) 109      09/07/20 1230  (!) 101  103/43    09/07/20 1221  (!) 110  (!) 67/32    09/07/20 1218 98.4 °F (36.9 °C) (!) 110 19 (!) 54/31 96 %   09/07/20 1032 98.4 °F (36.9 °C) 87 19 105/58 95 %   09/07/20 0936 98.6 °F (37 °C) 85 18 95/52 98 %     Oxygen Therapy  O2 Sat (%): 98 % (09/08/20 0830)  Pulse via Oximetry: 81 beats per minute (09/08/20 0646)  O2 Device: Endotracheal tube (09/07/20 1930)  FIO2 (%): 30 % (09/08/20 0830)    Estimated body mass index is 24.28 kg/m² as calculated from the following:    Height as of this encounter: 5' 7\" (1.702 m). Weight as of this encounter: 70.3 kg (155 lb). Intake/Output Summary (Last 24 hours) at 9/8/2020 0851  Last data filed at 9/8/2020 0544  Gross per 24 hour   Intake 5940.89 ml   Output 3340 ml   Net 2600.89 ml       *Note that automatically entered I/Os may not be accurate; dependent on patient compliance with collection and accurate  by techs. General:    Well nourished. Awake. Intubated. CV:   RRR. No murmur, rub, or gallop. Lungs:   CTAB. No wheezing, rhonchi, or rales. Intubated. Abdomen:   Soft, nontender, nondistended. Ex-lap incision bandage c/d/i. Two GILDA drains, bloody drainage on left, more maroon on right.    :  Soriano, dark yellow urine in bag.  Extremities: Warm and dry. No cyanosis or edema. Skin:     No rashes or jaundice. Neuro:  No gross focal deficits. Follows commands, shakes/nods to questions, tracks. Not on sedation.     Data Reviewed:  I have reviewed all labs, meds, and studies from the last 24 hours:  Recent Results (from the past 24 hour(s))   HGB & HCT    Collection Time: 09/07/20 10:29 AM   Result Value Ref Range    HGB 7.4 (L) 13.6 - 17.2 g/dL    HCT 22.7 (L) 41.1 - 50.3 %   FIBRINOGEN    Collection Time: 09/07/20 10:29 AM   Result Value Ref Range    Fibrinogen 156 (L) 190 - 501 mg/dL   PLASMA, ALLOCATE    Collection Time: 09/07/20 10:45 AM   Result Value Ref Range    Unit number [id number]     Blood component type FP 24h, Thaw     Unit division 00     Status of unit TRANSFUSED     Unit number [id number]     Blood component type FP 24h,Thaw     Unit division B0     Status of unit TRANSFUSED     Unit number [id number]     Blood component type FP 24h, Thaw     Unit division 00     Status of unit TRANSFUSED     Unit number [id number]     Blood component type FP 24h, Thaw     Unit division 00     Status of unit TRANSFUSED     Unit number [id number]     Blood component type FP 24h, Thaw     Unit division 00     Status of unit ALLOCATED     Unit number [id number]     Blood component type FP 24h, Thaw     Unit division 00     Status of unit ALLOCATED    HGB & HCT    Collection Time: 09/07/20 11:32 AM   Result Value Ref Range    HGB 7.1 (L) 13.6 - 17.2 g/dL    HCT 21.4 (L) 41.1 - 50.3 %   GLUCOSE, POC    Collection Time: 09/07/20 12:20 PM   Result Value Ref Range    Glucose (POC) 184 (H) 65 - 100 mg/dL   CRYOPRECIPITATE, ALLOCATE    Collection Time: 09/07/20  1:30 PM   Result Value Ref Range    Unit number [id number]     Blood component type CRYO,5U Thaw     Unit division 00     Status of unit TRANSFUSED     Unit number [id number]     Blood component type CRYO,5U Thaw     Unit division 00     Status of unit TRANSFUSED     Unit number [id number]     Blood component type CRYO,5U Thaw     Unit division 00     Status of unit TRANSFUSED    PLATELETS, ALLOCATE    Collection Time: 09/07/20  2:05 PM   Result Value Ref Range    Unit number [id number]     Blood component type PLTPH LR 3     Unit division 00     Status of unit TRANSFUSED     Unit number [id number]     Blood component type PLTPH LR,1     Unit division 00     Status of unit REL FROM [facility]    HGB & HCT    Collection Time: 09/07/20  2:35 PM   Result Value Ref Range    HGB 8.2 (L) 13.6 - 17.2 g/dL    HCT 24.7 (L) 41.1 - 50.3 %   FIBRINOGEN    Collection Time: 09/07/20  2:35 PM   Result Value Ref Range    Fibrinogen 267 190 - 501 mg/dL   POC CG8I    Collection Time: 09/07/20  2:35 PM   Result Value Ref Range    pH (POC) 7.31 (L) 7.35 - 7.45      pCO2 (POC) 41.2 35 - 45 MMHG    pO2 (POC) 338 (H) 75 - 100 MMHG    HCO3 (POC) 20.6 (L) 22 - 26 MMOL/L    sO2 (POC) 100 (H) 95 - 98 %    Base deficit (POC) 5 mmol/L    Sodium,  136 - 145 MMOL/L    Potassium, POC 4.3 3.5 - 5.1 MMOL/L    Glucose,  (H) 65 - 100 MG/DL    Calcium, ionized (POC) 1.00 (L) 1.12 - 1.32 mmol/L   POC CG8I    Collection Time: 09/07/20  3:31 PM   Result Value Ref Range    pH (POC) 7.30 (L) 7.35 - 7.45      pCO2 (POC) 50.0 (H) 35 - 45 MMHG    pO2 (POC) 375 (H) 75 - 100 MMHG    HCO3 (POC) 24.3 22 - 26 MMOL/L    sO2 (POC) 100 (H) 95 - 98 %    Base deficit (POC) 2 mmol/L    Sodium,  136 - 145 MMOL/L    Potassium, POC 4.4 3.5 - 5.1 MMOL/L    Glucose,  (H) 65 - 100 MG/DL    Calcium, ionized (POC) 1.29 1.12 - 1.32 mmol/L   PTT    Collection Time: 09/07/20  4:00 PM   Result Value Ref Range    aPTT 32.9 24.3 - 47.0 SEC   METABOLIC PANEL, BASIC    Collection Time: 09/07/20  4:00 PM   Result Value Ref Range    Sodium 146 (H) 136 - 145 mmol/L    Potassium 4.4 3.5 - 5.1 mmol/L    Chloride 117 (H) 98 - 107 mmol/L    CO2 24 21 - 32 mmol/L    Anion gap 5 (L) 7 - 16 mmol/L    Glucose 197 (H) 65 - 100 mg/dL    BUN 18 8 - 23 MG/DL    Creatinine 1.06 0.8 - 1.5 MG/DL    GFR est AA >60 >60 ml/min/1.73m2    GFR est non-AA >60 >60 ml/min/1.73m2    Calcium 7.4 (L) 8.3 - 10.4 MG/DL   PROTHROMBIN TIME + INR    Collection Time: 09/07/20  4:00 PM   Result Value Ref Range    Prothrombin time 17.7 (H) 12.0 - 14.7 sec    INR 1.4     CBC W/O DIFF    Collection Time: 09/07/20  4:00 PM   Result Value Ref Range    WBC 11.4 (H) 4.3 - 11.1 K/uL    RBC 3.30 (L) 4.23 - 5.6 M/uL    HGB 9.6 (L) 13.6 - 17.2 g/dL    HCT 28.1 (L) 41.1 - 50.3 %    MCV 85.2 79.6 - 97.8 FL    MCH 29.1 26.1 - 32.9 PG    MCHC 34.2 31.4 - 35.0 g/dL    RDW 15.0 (H) 11.9 - 14.6 %    PLATELET 92 (L) 174 - 450 K/uL    MPV 10.8 9.4 - 12.3 FL    ABSOLUTE NRBC 0.03 0.0 - 0.2 K/uL   POC CG8I    Collection Time: 09/07/20  4:11 PM   Result Value Ref Range    pH (POC) 7.42 7.35 - 7.45      pCO2 (POC) 35.1 35 - 45 MMHG    pO2 (POC) 345 (H) 75 - 100 MMHG    HCO3 (POC) 22.9 22 - 26 MMOL/L    sO2 (POC) 100 (H) 95 - 98 %    Base deficit (POC) 1 mmol/L    Sodium,  136 - 145 MMOL/L    Potassium, POC 4.2 3.5 - 5.1 MMOL/L    Glucose,  (H) 65 - 100 MG/DL    Calcium, ionized (POC) 1.21 1.12 - 1.32 mmol/L   POC G3    Collection Time: 09/07/20  6:15 PM   Result Value Ref Range    Device: VENT      FIO2 (POC) 60 %    pH (POC) 7.33 (L) 7.35 - 7.45      pCO2 (POC) 41.5 35 - 45 MMHG    pO2 (POC) 202 (H) 75 - 100 MMHG    HCO3 (POC) 21.7 (L) 22 - 26 MMOL/L    sO2 (POC) 100 (H) 95 - 98 %    Base deficit (POC) 4 mmol/L    Mode Pressure regulated volume control      Tidal volume 500 ml    Set Rate 16 bpm    PEEP/CPAP (POC) 8 cmH2O    Allens test (POC) NOT APPLICABLE      Inspiratory Time 0.87 sec    Site DRAWN FROM ARTERIAL LINE      Specimen type (POC) ARTERIAL      Performed by Yves     CO2, POC 23 MMOL/L    Exhaled minute volume 10.60 L/min    COLLECT TIME 1,812     CBC WITH AUTOMATED DIFF    Collection Time: 09/07/20  8:02 PM   Result Value Ref Range WBC 17.0 (H) 4.3 - 11.1 K/uL    RBC 4.29 4.23 - 5.6 M/uL    HGB 12.5 (L) 13.6 - 17.2 g/dL    HCT 36.4 (L) 41.1 - 50.3 %    MCV 84.8 79.6 - 97.8 FL    MCH 29.1 26.1 - 32.9 PG    MCHC 34.3 31.4 - 35.0 g/dL    RDW 15.1 (H) 11.9 - 14.6 %    PLATELET 336 (L) 800 - 450 K/uL    MPV 10.8 9.4 - 12.3 FL    ABSOLUTE NRBC 0.08 0.0 - 0.2 K/uL    NEUTROPHILS 77 43 - 78 %    LYMPHOCYTES 6 (L) 13 - 44 %    MONOCYTES 12 4.0 - 12.0 %    EOSINOPHILS 1 0.5 - 7.8 %    BASOPHILS 1 0.0 - 2.0 %    IMMATURE GRANULOCYTES 3 0.0 - 5.0 %    ABS. NEUTROPHILS 13.1 (H) 1.7 - 8.2 K/UL    ABS. LYMPHOCYTES 1.0 0.5 - 4.6 K/UL    ABS. MONOCYTES 2.0 (H) 0.1 - 1.3 K/UL    ABS. EOSINOPHILS 0.2 0.0 - 0.8 K/UL    ABS. BASOPHILS 0.2 0.0 - 0.2 K/UL    ABS. IMM. GRANS. 0.5 0.0 - 0.5 K/UL    RBC COMMENTS SLIGHT  ANISOCYTOSIS + POIKILOCYTOSIS        RBC COMMENTS SLIGHT  POLYCHROMASIA        RBC COMMENTS OCCASIONAL  MANAS CELLS        WBC COMMENTS Result Confirmed By Smear      PLATELET COMMENTS SLIGHT      DF AUTOMATED     METABOLIC PANEL, COMPREHENSIVE    Collection Time: 09/07/20  8:02 PM   Result Value Ref Range    Sodium 146 (H) 136 - 145 mmol/L    Potassium 4.4 3.5 - 5.1 mmol/L    Chloride 119 (H) 98 - 107 mmol/L    CO2 22 21 - 32 mmol/L    Anion gap 5 (L) 7 - 16 mmol/L    Glucose 168 (H) 65 - 100 mg/dL    BUN 19 8 - 23 MG/DL    Creatinine 1.19 0.8 - 1.5 MG/DL    GFR est AA >60 >60 ml/min/1.73m2    GFR est non-AA >60 >60 ml/min/1.73m2    Calcium 7.4 (L) 8.3 - 10.4 MG/DL    Bilirubin, total 0.7 0.2 - 1.1 MG/DL    ALT (SGPT) 27 12 - 65 U/L    AST (SGOT) 29 15 - 37 U/L    Alk.  phosphatase 41 (L) 50 - 136 U/L    Protein, total 4.1 (L) 6.3 - 8.2 g/dL    Albumin 1.9 (L) 3.2 - 4.6 g/dL    Globulin 2.2 (L) 2.3 - 3.5 g/dL    A-G Ratio 0.9 (L) 1.2 - 3.5     CBC W/O DIFF    Collection Time: 09/08/20  1:46 AM   Result Value Ref Range    WBC 14.9 (H) 4.3 - 11.1 K/uL    RBC 3.34 (L) 4.23 - 5.6 M/uL    HGB 9.7 (L) 13.6 - 17.2 g/dL    HCT 28.9 (L) 41.1 - 50.3 %    MCV 86.5 79.6 - 97.8 FL    MCH 29.0 26.1 - 32.9 PG    MCHC 33.6 31.4 - 35.0 g/dL    RDW 15.4 (H) 11.9 - 14.6 %    PLATELET 92 (L) 723 - 450 K/uL    MPV 10.9 9.4 - 12.3 FL    ABSOLUTE NRBC 0.09 0.0 - 0.2 K/uL   METABOLIC PANEL, BASIC    Collection Time: 09/08/20  3:44 AM   Result Value Ref Range    Sodium 147 (H) 136 - 145 mmol/L    Potassium 4.3 3.5 - 5.1 mmol/L    Chloride 117 (H) 98 - 107 mmol/L    CO2 21 21 - 32 mmol/L    Anion gap 9 7 - 16 mmol/L    Glucose 153 (H) 65 - 100 mg/dL    BUN 23 8 - 23 MG/DL    Creatinine 1.87 (H) 0.8 - 1.5 MG/DL    GFR est AA 47 (L) >60 ml/min/1.73m2    GFR est non-AA 39 (L) >60 ml/min/1.73m2    Calcium 7.2 (L) 8.3 - 10.4 MG/DL   POC G3    Collection Time: 09/08/20  3:46 AM   Result Value Ref Range    Device: VENT      FIO2 (POC) 40 %    pH (POC) 7.39 7.35 - 7.45      pCO2 (POC) 31.0 (L) 35 - 45 MMHG    pO2 (POC) 103 (H) 75 - 100 MMHG    HCO3 (POC) 18.9 (L) 22 - 26 MMOL/L    sO2 (POC) 98 95 - 98 %    Base deficit (POC) 5 mmol/L    Mode VENTILATOR/SPONTANEOUS/PRESSURE SUPPORT      PEEP/CPAP (POC) 8 cmH2O    Pressure support 6 cmH2O    Allens test (POC) NOT APPLICABLE      Total resp.  rate 21      Site DRAWN FROM ARTERIAL LINE      Specimen type (POC) ARTERIAL      Performed by [facility]     CO2, POC 20 MMOL/L    Respiratory comment: NurseNotified     Exhaled minute volume 10.10 L/min    COLLECT TIME 340          Current Meds:  Current Facility-Administered Medications   Medication Dose Route Frequency    0.9% sodium chloride infusion 250 mL  250 mL IntraVENous PRN    0.9% sodium chloride infusion 250 mL  250 mL IntraVENous PRN    ondansetron (ZOFRAN) injection 4 mg  4 mg IntraVENous Q6H PRN    promethazine (PHENERGAN) with saline injection 12.5 mg  12.5 mg IntraVENous Q6H PRN    pantoprazole (PROTONIX) 40 mg in 0.9% sodium chloride 10 mL injection  40 mg IntraVENous Q12H    octreotide (SANDOSTATIN) 500 mcg in 0.9% sodium chloride 500 mL infusion  50 mcg/hr IntraVENous CONTINUOUS    acetaminophen (TYLENOL) suppository 650 mg  650 mg Rectal Q4H PRN    diphenhydrAMINE (BENADRYL) capsule 25 mg  25 mg Oral Q6H PRN    0.9% sodium chloride infusion 250 mL  250 mL IntraVENous PRN    HYDROmorphone (PF) (DILAUDID) injection 0.5 mg  0.5 mg IntraVENous Q4H PRN    0.9% sodium chloride infusion 250 mL  250 mL IntraVENous PRN    NOREPINephrine (LEVOPHED) 4 mg in 5% dextrose 250 mL infusion  0.5-16 mcg/min IntraVENous TITRATE    0.9% sodium chloride infusion 250 mL  250 mL IntraVENous PRN    0.9% sodium chloride infusion 250 mL  250 mL IntraVENous PRN    0.9% sodium chloride infusion 250 mL  250 mL IntraVENous PRN    vasopressin (VASOSTRICT) 20 Units in 0.9% sodium chloride 100 mL infusion  0-0.1 Units/min IntraVENous TITRATE    HYDROmorphone (PF) (DILAUDID) injection 1 mg  1 mg IntraVENous Q2H PRN    piperacillin-tazobactam (ZOSYN) 4.5 g in 0.9% sodium chloride (MBP/ADV) 100 mL  4.5 g IntraVENous Q8H    fentaNYL in normal saline (pf) 25 mcg/mL infusion   mcg/hr IntraVENous TITRATE    lactated Ringers infusion  125 mL/hr IntraVENous CONTINUOUS       Other Studies:  No results found for this visit on 09/07/20. Xr Chest Sngl V    Result Date: 9/8/2020   Portable view of the chest COMPARISON: Yesterday CLINICAL HISTORY: Shortness of breath. FINDINGS: Endotracheal tube, enteric tube, right-sided chest port and left-sided IJ line are stable. Heart is enlarged. Mild bibasilar opacities. There is no pneumothorax or pulmonary edema. IMPRESSION: 1. Mild bibasilar opacities, likely atelectasis. ET tube tip is in good position. 2. No significant change compared to prior exam.    Xr Chest Sngl V    Result Date: 9/7/2020  Chest X-ray INDICATION: Post intubation A portable AP view of the chest was obtained. FINDINGS: Endotracheal tube and nasogastric tube are in good position. Left-sided central line has also been placed, tip near the cavoatrial junction. The lungs are clear.  There are no infiltrates or effusions. The heart size is normal.  Vascular port is present. IMPRESSION: Support tubing is in good position. All Micro Results     None          SARS-CoV-2 Lab Results  \"Novel Coronavirus\" Test: No results found for: COV2NT   \"Emergent Disease\" Test: No results found for: EDPR  \"SARS-COV-2\" Test: No results found for: XGCOVT  \"Precision Labs\" Test: No results found for: RSLT  Rapid Test:   Lab Results   Component Value Date/Time    COVR Not detected 08/24/2020 03:45 PM            Assessment and Plan:     Hospital Problems as of 9/8/2020 Date Reviewed: 8/31/2020          Codes Class Noted - Resolved POA    Hypovolemic shock ([facility] ICD-10-CM: R57.1  ICD-9-CM: 785.59  9/8/2020 - Present Unknown        Pancreatitis ICD-10-CM: K85.90  ICD-9-CM: 604.6  9/7/2020 - Present Yes        * (Principal) Acute blood loss anemia ICD-10-CM: D62  ICD-9-CM: 285.1  9/2/2020 - Present Unknown        Pancreatic cancer ([facility] ICD-10-CM: C25.9  ICD-9-CM: 157.9  9/2/2020 - Present Yes        Duodenal mass ICD-10-CM: K31.89  ICD-9-CM: 537.89  9/2/2020 - Present Yes        Iron deficiency anemia ICD-10-CM: D50.9  ICD-9-CM: 280.9  9/1/2020 - Present Yes        Malignant neoplasm of head of pancreas ([facility] ICD-10-CM: C25.0  ICD-9-CM: 157.0  8/31/2020 - Present Yes        Dietz's esophagus without dysplasia ICD-10-CM: K22.70  ICD-9-CM: 530.85  8/12/2020 - Present Yes        Essential hypertension ICD-10-CM: I10  ICD-9-CM: 401.9  5/8/2018 - Present Yes        Mixed hyperlipidemia ICD-10-CM: E78.2  ICD-9-CM: 272.2  5/8/2018 - Present Yes              Plan:  # Acute blood loss anemia and hypovolemic shock secondary to bleeding duodenal ulcer   - Shock and bleeding appear to be resolved. Not on vasopressors currently. No active bleeding. Hb stable at 9.7.    - Recent dx of metastatic pancreatic cancer. EGD showed duodenal ulcer. Had mesenteric arteriogram 9/3 with coiling. Re-bled, to OR on 9/7 for ex-lap.  Left intubated post-op. Weaning vent this AM with plans to extubate. GI, surgery, Heme/onc following. Con't PPI, abx, octreotide. # JULIAN   - Cr 1.89, normal baseline. Con't IVFs. BMPs daily. # HTN   - Hold meds. # Metastatic pancreatic adenocarcinoma    DC planning/Dispo: Unclear. Likely extubate today. Con't ICU. Diet:  DIET NPO  DVT ppx: SCDs only.      Signed:  Jair Dubon MD

## 2020-09-08 NOTE — PROGRESS NOTES
Problem: Ventilator Management  Goal: *Absence of infection signs and symptoms  Outcome: Progressing Towards Goal  Goal: *Normal spontaneous ventilation  Outcome: Progressing Towards Goal     Problem: Patient Education: Go to Patient Education Activity  Goal: Patient/Family Education  Outcome: Progressing Towards Goal     Problem: Pressure Injury - Risk of  Goal: *Prevention of pressure injury  Description: Document Amadeo Scale and appropriate interventions in the flowsheet.   Outcome: Progressing Towards Goal  Note: Pressure Injury Interventions:  Sensory Interventions: Assess need for specialty bed, Check visual cues for pain, Keep linens dry and wrinkle-free         Activity Interventions: Pressure redistribution bed/mattress(bed type)    Mobility Interventions: Float heels, HOB 30 degrees or less, Pressure redistribution bed/mattress (bed type)    Nutrition Interventions: Document food/fluid/supplement intake, Discuss nutritional consult with provider                     Problem: Patient Education: Go to Patient Education Activity  Goal: Patient/Family Education  Outcome: Progressing Towards Goal

## 2020-09-09 ENCOUNTER — APPOINTMENT (OUTPATIENT)
Dept: GENERAL RADIOLOGY | Age: 65
DRG: 326 | End: 2020-09-09
Attending: INTERNAL MEDICINE
Payer: COMMERCIAL

## 2020-09-09 LAB
ALBUMIN SERPL-MCNC: 1.9 G/DL (ref 3.2–4.6)
ALBUMIN/GLOB SERPL: 0.8 {RATIO} (ref 1.2–3.5)
ALP SERPL-CCNC: 36 U/L (ref 50–136)
ALT SERPL-CCNC: 19 U/L (ref 12–65)
ANION GAP SERPL CALC-SCNC: 8 MMOL/L (ref 7–16)
AST SERPL-CCNC: 17 U/L (ref 15–37)
BASOPHILS # BLD: 0 K/UL (ref 0–0.2)
BASOPHILS NFR BLD: 0 % (ref 0–2)
BILIRUB SERPL-MCNC: 2.8 MG/DL (ref 0.2–1.1)
BUN SERPL-MCNC: 20 MG/DL (ref 8–23)
CALCIUM SERPL-MCNC: 7.3 MG/DL (ref 8.3–10.4)
CHLORIDE SERPL-SCNC: 114 MMOL/L (ref 98–107)
CO2 SERPL-SCNC: 25 MMOL/L (ref 21–32)
CREAT SERPL-MCNC: 1.13 MG/DL (ref 0.8–1.5)
DIFFERENTIAL METHOD BLD: ABNORMAL
EOSINOPHIL # BLD: 0.6 K/UL (ref 0–0.8)
EOSINOPHIL NFR BLD: 4 % (ref 0.5–7.8)
ERYTHROCYTE [DISTWIDTH] IN BLOOD BY AUTOMATED COUNT: 16.6 % (ref 11.9–14.6)
GLOBULIN SER CALC-MCNC: 2.4 G/DL (ref 2.3–3.5)
GLUCOSE BLD STRIP.AUTO-MCNC: 105 MG/DL (ref 65–100)
GLUCOSE BLD STRIP.AUTO-MCNC: 109 MG/DL (ref 65–100)
GLUCOSE BLD STRIP.AUTO-MCNC: 115 MG/DL (ref 65–100)
GLUCOSE BLD STRIP.AUTO-MCNC: 122 MG/DL (ref 65–100)
GLUCOSE BLD STRIP.AUTO-MCNC: 126 MG/DL (ref 65–100)
GLUCOSE SERPL-MCNC: 125 MG/DL (ref 65–100)
HCT VFR BLD AUTO: 25 % (ref 41.1–50.3)
HGB BLD-MCNC: 8.2 G/DL (ref 13.6–17.2)
IMM GRANULOCYTES # BLD AUTO: 0.3 K/UL (ref 0–0.5)
IMM GRANULOCYTES NFR BLD AUTO: 2 % (ref 0–5)
LYMPHOCYTES # BLD: 0.7 K/UL (ref 0.5–4.6)
LYMPHOCYTES NFR BLD: 5 % (ref 13–44)
MCH RBC QN AUTO: 29.4 PG (ref 26.1–32.9)
MCHC RBC AUTO-ENTMCNC: 32.8 G/DL (ref 31.4–35)
MCV RBC AUTO: 89.6 FL (ref 79.6–97.8)
MONOCYTES # BLD: 1.4 K/UL (ref 0.1–1.3)
MONOCYTES NFR BLD: 10 % (ref 4–12)
NEUTS SEG # BLD: 10.9 K/UL (ref 1.7–8.2)
NEUTS SEG NFR BLD: 79 % (ref 43–78)
NRBC # BLD: 0.02 K/UL (ref 0–0.2)
PLATELET # BLD AUTO: 99 K/UL (ref 150–450)
PMV BLD AUTO: 11.2 FL (ref 9.4–12.3)
POTASSIUM SERPL-SCNC: 3.9 MMOL/L (ref 3.5–5.1)
PROT SERPL-MCNC: 4.3 G/DL (ref 6.3–8.2)
RBC # BLD AUTO: 2.79 M/UL (ref 4.23–5.6)
SODIUM SERPL-SCNC: 147 MMOL/L (ref 136–145)
WBC # BLD AUTO: 13.9 K/UL (ref 4.3–11.1)

## 2020-09-09 PROCEDURE — 74011250636 HC RX REV CODE- 250/636: Performed by: INTERNAL MEDICINE

## 2020-09-09 PROCEDURE — 99232 SBSQ HOSP IP/OBS MODERATE 35: CPT | Performed by: INTERNAL MEDICINE

## 2020-09-09 PROCEDURE — 74011000250 HC RX REV CODE- 250: Performed by: INTERNAL MEDICINE

## 2020-09-09 PROCEDURE — 82962 GLUCOSE BLOOD TEST: CPT

## 2020-09-09 PROCEDURE — 74011000258 HC RX REV CODE- 258: Performed by: SURGERY

## 2020-09-09 PROCEDURE — 94640 AIRWAY INHALATION TREATMENT: CPT

## 2020-09-09 PROCEDURE — 77030013140 HC MSK NEB VYRM -A

## 2020-09-09 PROCEDURE — 77010033678 HC OXYGEN DAILY

## 2020-09-09 PROCEDURE — 80053 COMPREHEN METABOLIC PANEL: CPT

## 2020-09-09 PROCEDURE — 65270000029 HC RM PRIVATE

## 2020-09-09 PROCEDURE — 71045 X-RAY EXAM CHEST 1 VIEW: CPT

## 2020-09-09 PROCEDURE — 85025 COMPLETE CBC W/AUTO DIFF WBC: CPT

## 2020-09-09 PROCEDURE — C9113 INJ PANTOPRAZOLE SODIUM, VIA: HCPCS | Performed by: INTERNAL MEDICINE

## 2020-09-09 PROCEDURE — 74011250636 HC RX REV CODE- 250/636: Performed by: SURGERY

## 2020-09-09 PROCEDURE — 36592 COLLECT BLOOD FROM PICC: CPT

## 2020-09-09 RX ORDER — SODIUM CHLORIDE 900 MG/100ML
INJECTION INTRAVENOUS
Status: ACTIVE
Start: 2020-09-09 | End: 2020-09-10

## 2020-09-09 RX ORDER — PIPERACILLIN SODIUM, TAZOBACTAM SODIUM 4; .5 G/20ML; G/20ML
INJECTION, POWDER, LYOPHILIZED, FOR SOLUTION INTRAVENOUS
Status: ACTIVE
Start: 2020-09-09 | End: 2020-09-10

## 2020-09-09 RX ORDER — SODIUM CHLORIDE FOR INHALATION 3 %
4 VIAL, NEBULIZER (ML) INHALATION 2 TIMES DAILY
Status: DISCONTINUED | OUTPATIENT
Start: 2020-09-09 | End: 2020-09-16

## 2020-09-09 RX ADMIN — HYDROMORPHONE HYDROCHLORIDE 1 MG: 1 INJECTION, SOLUTION INTRAMUSCULAR; INTRAVENOUS; SUBCUTANEOUS at 05:07

## 2020-09-09 RX ADMIN — PIPERACILLIN AND TAZOBACTAM 4.5 G: 4; .5 INJECTION, POWDER, FOR SOLUTION INTRAVENOUS at 05:39

## 2020-09-09 RX ADMIN — HYDROMORPHONE HYDROCHLORIDE 1 MG: 1 INJECTION, SOLUTION INTRAMUSCULAR; INTRAVENOUS; SUBCUTANEOUS at 01:17

## 2020-09-09 RX ADMIN — HYDROMORPHONE HYDROCHLORIDE 1 MG: 1 INJECTION, SOLUTION INTRAMUSCULAR; INTRAVENOUS; SUBCUTANEOUS at 15:18

## 2020-09-09 RX ADMIN — HYDROMORPHONE HYDROCHLORIDE 1 MG: 1 INJECTION, SOLUTION INTRAMUSCULAR; INTRAVENOUS; SUBCUTANEOUS at 22:31

## 2020-09-09 RX ADMIN — SODIUM CHLORIDE 40 MG: 9 INJECTION INTRAMUSCULAR; INTRAVENOUS; SUBCUTANEOUS at 21:13

## 2020-09-09 RX ADMIN — OCTREOTIDE ACETATE 50 MCG/HR: 500 INJECTION, SOLUTION INTRAVENOUS; SUBCUTANEOUS at 22:31

## 2020-09-09 RX ADMIN — SODIUM CHLORIDE 40 MG: 9 INJECTION INTRAMUSCULAR; INTRAVENOUS; SUBCUTANEOUS at 09:00

## 2020-09-09 RX ADMIN — SODIUM CHLORIDE, SODIUM LACTATE, POTASSIUM CHLORIDE, AND CALCIUM CHLORIDE 125 ML/HR: 600; 310; 30; 20 INJECTION, SOLUTION INTRAVENOUS at 23:40

## 2020-09-09 RX ADMIN — SODIUM CHLORIDE, SODIUM LACTATE, POTASSIUM CHLORIDE, AND CALCIUM CHLORIDE 125 ML/HR: 600; 310; 30; 20 INJECTION, SOLUTION INTRAVENOUS at 06:33

## 2020-09-09 RX ADMIN — PIPERACILLIN AND TAZOBACTAM 4.5 G: 4; .5 INJECTION, POWDER, FOR SOLUTION INTRAVENOUS at 21:12

## 2020-09-09 RX ADMIN — SODIUM CHLORIDE, SODIUM LACTATE, POTASSIUM CHLORIDE, AND CALCIUM CHLORIDE 125 ML/HR: 600; 310; 30; 20 INJECTION, SOLUTION INTRAVENOUS at 15:51

## 2020-09-09 RX ADMIN — PIPERACILLIN AND TAZOBACTAM 4.5 G: 4; .5 INJECTION, POWDER, FOR SOLUTION INTRAVENOUS at 16:47

## 2020-09-09 RX ADMIN — SODIUM CHLORIDE SOLN NEBU 3% 4 ML: 3 NEBU SOLN at 12:13

## 2020-09-09 NOTE — PROGRESS NOTES
09/09/20 1543   Dual Skin Pressure Injury Assessment   Dual Skin Pressure Injury Assessment WDL   Second Care Provider (Based on [address] [place] [address]) Marco Mills, RN    Skin Integumentary   Skin Integumentary (WDL) X    Pressure  Injury Documentation No Pressure Injury Noted-Pressure Ulcer Prevention Initiated   Skin Condition/Temp Dry; Warm   Skin Integrity Incision (comment)  (abd midline, R & L GILDA drains )

## 2020-09-09 NOTE — PROGRESS NOTES
Inpatient Hematology / Oncology Progress Note    Reason for Consult:  Acute blood loss anemia [D62]; Duodenal mass [K31.89]  Referring Physician:  Stephanie Lopes MD    24 Hour Events:  POD #2 ex-lap  Extubated yesterday  No bleeding  Hgb stable at 8.2  VSS, afebrile        ROS:  Constitutional: Negative for fever, chills, weakness, malaise, fatigue. CV: Negative for chest pain, palpitations, edema. Respiratory: Negative for dyspnea, cough, wheezing. GI: Negative for nausea, abdominal pain, diarrhea. 10 point review of systems is otherwise negative with the exception of the elements mentioned above in the HPI.        Allergies   Allergen Reactions    Tetanus And Diphtheria Toxoids Swelling     As child     Past Medical History:   Diagnosis Date    Cancer ([facility] [address]     pancreatic    GERD (gastroesophageal reflux disease)     Hernia      Past Surgical History:   Procedure Laterality Date    HX CATARACT REMOVAL Left 12/15/2017    HX HERNIA REPAIR      HX ORTHOPAEDIC Right     tibia- with hardware    IR INSERT TUNL CVC W PORT OVER 5 YEARS  2020    IR OCCL TXCATH HEMMORAGE W SI  9/3/2020     Family History   Problem Relation Age of Onset    No Known Problems Mother     Cancer Father 72         leukemia    Diabetes Sister     No Known Problems Brother     No Known Problems Sister     Cancer Sister      Social History     Socioeconomic History    Marital status:      Spouse name: Not on file    Number of children: Not on file    Years of education: Not on file    Highest education level: Not on file   Occupational History    Not on file   Social Needs    Financial resource strain: Not on file    Food insecurity     Worry: Not on file     Inability: Not on file    Transportation needs     Medical: Not on file     Non-medical: Not on file   Tobacco Use    Smoking status: Former Smoker     Packs/day: 1.00     Years: 10.00     Pack years: 10.00     Last attempt to quit: 2006     Years since quittin.6    Smokeless tobacco: Never Used   Substance and Sexual Activity    Alcohol use: Not Currently    Drug use: Never    Sexual activity: Not on file   Lifestyle    Physical activity     Days per week: Not on file     Minutes per session: Not on file    Stress: Not on file   Relationships    Social connections     Talks on phone: Not on file     Gets together: Not on file     Attends Alevism service: Not on file     Active member of club or organization: Not on file     Attends meetings of clubs or organizations: Not on file     Relationship status: Not on file    Intimate partner violence     Fear of current or ex partner: Not on file     Emotionally abused: Not on file     Physically abused: Not on file     Forced sexual activity: Not on file   Other Topics Concern    Not on file   Social History Narrative    Not on file     Current Facility-Administered Medications   Medication Dose Route Frequency Provider Last Rate Last Dose    insulin lispro (HUMALOG) injection 0-10 Units  0-10 Units SubCUTAneous AC&HS FAROOQ Bridges   Stopped at 20 1630    dextrose 40% (GLUTOSE) oral gel 1 Tube  15 g Oral PRN FAROOQ Bridges        glucagon (GLUCAGEN) injection 1 mg  1 mg IntraMUSCular PRN FAROOQ Bridges        dextrose (D50W) injection syrg 12.5-25 g  25-50 mL IntraVENous PRN FAROOQ Bridges        lip protectant (BLISTEX) ointment 1 Each  1 Each Topical PRN Mecca Ackerman MD   1 Each at 20 2350    ondansetron (ZOFRAN) injection 4 mg  4 mg IntraVENous Q6H PRN Benigno Etienne MD   4 mg at 20 1335    promethazine (PHENERGAN) with saline injection 12.5 mg  12.5 mg IntraVENous Q6H PRN Benigno Etienne MD        pantoprazole (PROTONIX) 40 mg in 0.9% sodium chloride 10 mL injection  40 mg IntraVENous Q12H Benigno Etienne MD   40 mg at 20 2138    octreotide (SANDOSTATIN) 500 mcg in 0.9% sodium chloride 500 mL infusion  50 mcg/hr IntraVENous CONTINUOUS Tripp Mitchell MD 50 mL/hr at 20 50 mcg/hr at 20    acetaminophen (TYLENOL) suppository 650 mg  650 mg Rectal Q4H PRN Tripp Mitchell MD        diphenhydrAMINE (BENADRYL) capsule 25 mg  25 mg Oral Q6H PRN Tripp Mitchell MD        HYDROmorphone (PF) (DILAUDID) injection 0.5 mg  0.5 mg IntraVENous Y6P PRN Grady Kawasaki, MD        0.9% sodium chloride infusion 250 mL  250 mL IntraVENous PRN Solvang Li C, DO        HYDROmorphone (PF) (DILAUDID) injection 1 mg  1 mg IntraVENous T8V PRN Grady Kawasaki, MD   1 mg at 20 0507    piperacillin-tazobactam (ZOSYN) 4.5 g in 0.9% sodium chloride (MBP/ADV) 100 mL  4.5 g IntraVENous G6I Grady Kawasaki, MD 25 mL/hr at 20 0539 4.5 g at 20 0539    lactated Ringers infusion  125 mL/hr IntraVENous CONTINUOUS Chaz Marti  mL/hr at 20 0633 125 mL/hr at 20 6876       OBJECTIVE:  Patient Vitals for the past 8 hrs:   BP Temp Pulse Resp SpO2 Weight   20 0836     96 %    20 0629 142/66  70 16 100 %    20 0600 133/62  71 19 100 %    20 0530      179 lb 8 oz (81.4 kg)   20 0529 145/71  79 17 99 %    20 0502 133/63  74 25 100 %    20 0429 149/67  79 27 99 %    20 0400 132/77  74 30 98 %    20 0330 141/64 99.3 °F (37.4 °C) 73 18 100 %    20 0300 162/73  72 18 100 %    20 0229 138/64  79 20 99 %    20 0200 145/67  81 23 99 %    20 0130 144/72  83 23 100 %      Temp (24hrs), Av.4 °F (37.4 °C), Min:99.1 °F (37.3 °C), Max:99.6 °F (37.6 °C)    No intake/output data recorded. Physical Exam:  Constitutional: Well developed, well nourished male in no acute distress, sitting comfortably in the hospital bed. HEENT: Normocephalic and atraumatic. Oropharynx is clear, mucous membranes are moist. Extraocular muscles are intact. Sclerae anicteric. Neck supple without JVD.  No thyromegaly present. Lymph node   Deferred. Skin Warm and dry. No bruising and no rash noted. No erythema. No pallor. Respiratory Lungs are coarse bilaterally (clears with coughing), normal air exchange without accessory muscle use. CVS Normal rate, regular rhythm and normal S1 and S2. No murmurs, gallops, or rubs. Abdomen +NG tube. Soft, nontender and nondistended, normoactive bowel sounds. No palpable mass. No hepatosplenomegaly. Neuro +awake, following commands. Grossly nonfocal with no obvious sensory or motor deficits. MSK Normal range of motion in general.  No edema and no tenderness. Psych Appropriate mood and affect.         Labs:    Recent Results (from the past 24 hour(s))   HGB & HCT    Collection Time: 09/08/20 10:02 AM   Result Value Ref Range    HGB 8.9 (L) 13.6 - 17.2 g/dL    HCT 26.9 (L) 41.1 - 50.3 %   GLUCOSE, POC    Collection Time: 09/08/20 12:53 PM   Result Value Ref Range    Glucose (POC) 157 (H) 65 - 100 mg/dL   GLUCOSE, POC    Collection Time: 09/08/20  4:58 PM   Result Value Ref Range    Glucose (POC) 134 (H) 65 - 100 mg/dL   HGB & HCT    Collection Time: 09/08/20  7:49 PM   Result Value Ref Range    HGB 8.3 (L) 13.6 - 17.2 g/dL    HCT 24.9 (L) 41.1 - 50.3 %   GLUCOSE, POC    Collection Time: 09/08/20  9:36 PM   Result Value Ref Range    Glucose (POC) 131 (H) 65 - 100 mg/dL   CBC WITH AUTOMATED DIFF    Collection Time: 09/09/20  3:43 AM   Result Value Ref Range    WBC 13.9 (H) 4.3 - 11.1 K/uL    RBC 2.79 (L) 4.23 - 5.6 M/uL    HGB 8.2 (L) 13.6 - 17.2 g/dL    HCT 25.0 (L) 41.1 - 50.3 %    MCV 89.6 79.6 - 97.8 FL    MCH 29.4 26.1 - 32.9 PG    MCHC 32.8 31.4 - 35.0 g/dL    RDW 16.6 (H) 11.9 - 14.6 %    PLATELET 99 (L) 663 - 450 K/uL    MPV 11.2 9.4 - 12.3 FL    ABSOLUTE NRBC 0.02 0.0 - 0.2 K/uL    DF AUTOMATED      NEUTROPHILS 79 (H) 43 - 78 %    LYMPHOCYTES 5 (L) 13 - 44 %    MONOCYTES 10 4.0 - 12.0 %    EOSINOPHILS 4 0.5 - 7.8 %    BASOPHILS 0 0.0 - 2.0 %    IMMATURE GRANULOCYTES 2 0.0 - 5.0 %    ABS. NEUTROPHILS 10.9 (H) 1.7 - 8.2 K/UL    ABS. LYMPHOCYTES 0.7 0.5 - 4.6 K/UL    ABS. MONOCYTES 1.4 (H) 0.1 - 1.3 K/UL    ABS. EOSINOPHILS 0.6 0.0 - 0.8 K/UL    ABS. BASOPHILS 0.0 0.0 - 0.2 K/UL    ABS. IMM. GRANS. 0.3 0.0 - 0.5 K/UL   METABOLIC PANEL, COMPREHENSIVE    Collection Time: 09/09/20  3:43 AM   Result Value Ref Range    Sodium 147 (H) 136 - 145 mmol/L    Potassium 3.9 3.5 - 5.1 mmol/L    Chloride 114 (H) 98 - 107 mmol/L    CO2 25 21 - 32 mmol/L    Anion gap 8 7 - 16 mmol/L    Glucose 125 (H) 65 - 100 mg/dL    BUN 20 8 - 23 MG/DL    Creatinine 1.13 0.8 - 1.5 MG/DL    GFR est AA >60 >60 ml/min/1.73m2    GFR est non-AA >60 >60 ml/min/1.73m2    Calcium 7.3 (L) 8.3 - 10.4 MG/DL    Bilirubin, total 2.8 (H) 0.2 - 1.1 MG/DL    ALT (SGPT) 19 12 - 65 U/L    AST (SGOT) 17 15 - 37 U/L    Alk.  phosphatase 36 (L) 50 - 136 U/L    Protein, total 4.3 (L) 6.3 - 8.2 g/dL    Albumin 1.9 (L) 3.2 - 4.6 g/dL    Globulin 2.4 2.3 - 3.5 g/dL    A-G Ratio 0.8 (L) 1.2 - 3.5     GLUCOSE, POC    Collection Time: 09/09/20  8:10 AM   Result Value Ref Range    Glucose (POC) 126 (H) 65 - 100 mg/dL       Imaging:      ASSESSMENT:  Problem List  Date Reviewed: 9/9/2020          Codes Class Noted    Hypovolemic shock ([facility] ICD-10-CM: R57.1  ICD-9-CM: 785.59  9/8/2020        Respiratory failure, post-operative ([facility] ICD-10-CM: H38.017  ICD-9-CM: 518.51  9/8/2020        Duodenal ulcer with hemorrhage ICD-10-CM: K26.4  ICD-9-CM: 532.40  9/8/2020        Pancreatitis ICD-10-CM: K85.90  ICD-9-CM: 577.0  9/7/2020        Hematemesis ICD-10-CM: K92.0  ICD-9-CM: 578.0  9/2/2020        * (Principal) Acute blood loss anemia ICD-10-CM: D62  ICD-9-CM: 285.1  9/2/2020        Pancreatic cancer ([facility] ICD-10-CM: C25.9  ICD-9-CM: 157.9  9/2/2020        Hypotension ICD-10-CM: I95.9  ICD-9-CM: 458.9  9/2/2020        Duodenal mass ICD-10-CM: K31.89  ICD-9-CM: 537.89  9/2/2020        Iron deficiency anemia ICD-10-CM: D50.9  ICD-9-CM: 280.9  9/1/2020        Malignant neoplasm of head of pancreas ([facility] [address] ICD-10-CM: C25.0  ICD-9-CM: 157.0  8/31/2020        Duodenal ulcer ICD-10-CM: K26.9  ICD-9-CM: 532.90  8/12/2020        Dietz's esophagus without dysplasia ICD-10-CM: K22.70  ICD-9-CM: 530.85  8/12/2020        Impaired fasting glucose ICD-10-CM: R73.01  ICD-9-CM: 790.21  5/8/2018        Essential hypertension ICD-10-CM: I10  ICD-9-CM: 401.9  5/8/2018        Mixed hyperlipidemia ICD-10-CM: E78.2  ICD-9-CM: 272.2  5/8/2018        PAC (premature atrial contraction) ICD-10-CM: I49.1  ICD-9-CM: 427.61  5/8/2018        Overweight (BMI 25.0-29. 9) ICD-10-CM: [id number]  ICD-9-CM: Rhoderick Miners  5/8/2018            60 male, recent diagnosis of pancreatic mass with duodenal ulcerated lesion (involving more than 3/4 of circumference) and hepatic metastasis, felt to likely represent metastatic pancreatic cancer, recently hospitalized with hematemesis from 9/2/20-9/6/20. He had required IR directed angiography with embolization of likely pancreatic mass that had eroded into duodenum. He had also received infed during admission. He was felt clinically stable for discharge, however now readmitted w BPR. On evaluation today, pt w rapid reponse being called as had multiple large blood bowel movements as well as single hematemesis, currently hypotensive. He is s/p 3 unit PRBC, w/ plans for FFP. Fibrinogen stable. 9/7 Case d/w hospitalist as well as surgery and rad-onc. Case also discussed w wife at bedside. Given hemorrhagic appearing nature of bleeding w hemodynamic instability, pt will likely need surgical intervention to help stop bleeding, w subsequent ICU monitoring. No current inpatient role for systemic chemotherapy. Aggressive blood transfusion support as needed. Guarded prognosis. We will follow along.      9/8 s/p Exlap 9/7 with resection of distal stomach and proximal duodenum, oversew of bleeding vessel in 2nd portion of duodenum, cholecystectomy, gastrojejunostomy; path pending. Remains intubated, hopeful for extubation today as he awakens and follows commands currently. Hgb stable at 9.7. Plts 92k. Coags yesterday were stable. Continue serial H&H q6 hrs. No signs of bleeding. 9/9 POD#2. Extubated yesterday. Awake and conversive. Remains in ICU and will hopefully be able to move out to the floor today. No signs of bleeding. Hgb stable at 8.2 this morning. Lab studies and imaging studies (CT) were personally reviewed. Pertinent old records were reviewed. Thank you for allowing us to participate in the care of Mr. Nance. We will continue to follow along. He will need to f/u with Dr Elizabeth Borden on discharge. AYLIN Brito. [address] Hematology & Oncology  [address] [place] [address]  Office : [phone]  Fax : [phone]           Attending Addendum:  I have personally performed a face to face diagnostic evaluation on this patient. I have reviewed and agree with the care plan as documented by Joseline Bobo N.P. My findings are as follows:  He has metastatic pancreatic cancer, appears weak, heart rate regular without murmurs, abdomen is non-tender, bowel sounds are positive, we will follow this patient, he will need chemotherapy as an outpatient.               Gerda Long MD      [facility] [place] Hematology/Oncology  [address] [place] [address]  Office : [phone]  Fax : [phone]

## 2020-09-09 NOTE — PROGRESS NOTES
Hospitalist Note     Admit Date:  2020 12:31 AM   Name:  Najma López   Age:  60 y.o.  :  1955   MRN:  [id number]   PCP:  Radha Crews MD  Treatment Team: Attending Provider: Stephanie Lopes MD; Consulting Provider: Nitza Zambrano MD; Consulting Provider: Shahana Whittington NP; Consulting Provider: Stephanie Lopes MD; Utilization Review: Arline Leong; Consulting Provider: Karen Chung MD; Care Manager: Yahaira Cordero RN; Utilization Review: Caryle Brasher, RN    HPI/Subjective:   Mr. Luis Gibbs is a 59 y/o male with a h/o pancreatic adenocarcinoma and liver metastasis who underwent EUS on  showing large ulcerated duodenal mass with a distal stricture. He was admitted from - with acute blood loss anemia 2/2 duodenal tumor bleeding. Transfused 4U RBCs. GI, IR consulted. Had mesenteric arteriogram with coil embolization on 9/3. Port placed and planned to start chemotherapy on . He developed dark red blood in his stools on the night of  (after discharge). He came back to the ER. WBCs 16K, Hb 6.7 (was 8.6 on discharge), positive FOBT. Early on the morning of admission he developed vomiting, diaphoresis and profuse/brisk rectal bleeding. He was given 7 units of emergent release blood, cryo, FFP. Case was discussed with numerous specialties and eventually the patient went emergently to the OR where he had ex-lap with resection of distal stomach, proximal duodenum, oversew of bleeding duodenal vessel (2nd portion), CCY, CBD exploration and gastrojejunosotomy, drain placement x2. Admitted to ICU post-operatively. Extubated to [place] on .    : JULIAN resolved. On NC. C/o mild abdominal pain. No vomiting. Hb stable, no further overt bleeding.     No other complaints  Objective:     Patient Vitals for the past 24 hrs:   Temp Pulse Resp BP SpO2   20 0836     96 %   20 0629  70 16 142/66 100 %   20 0600  71 19 133/62 100 %   20 0529  79 17 145/71 99 %   09/09/20 0502  74 25 133/63 100 %   09/09/20 0429  79 27 149/67 99 %   09/09/20 0400  74 30 132/77 98 %   09/09/20 0330 99.3 °F (37.4 °C) 73 18 141/64 100 %   09/09/20 0300  72 18 162/73 100 %   09/09/20 0229  79 20 138/64 99 %   09/09/20 0200  81 23 145/67 99 %   09/09/20 0130  83 23 144/72 100 %   09/09/20 0100  80 21 145/67 99 %   09/09/20 0029  80 28 141/66 98 %   09/09/20 0000  81 (!) 31 145/67 99 %   09/08/20 2330 99.4 °F (37.4 °C) 78 22 154/71 99 %   09/08/20 2300  81 21 161/74 99 %   09/08/20 2229  79 24 154/76 100 %   09/08/20 2200  80 25 142/66 99 %   09/08/20 2129  84 27 136/67 100 %   09/08/20 2100  76 19 141/61 100 %   09/08/20 2029  81 18 137/62 99 %   09/08/20 2000  85 24 138/63 99 %   09/08/20 1936 99.1 °F (37.3 °C) 85 30 145/67 98 %   09/08/20 1900  81 (!) 32 138/63 98 %   09/08/20 1800  97 27 139/67 99 %   09/08/20 1700  83 9 139/63 99 %   09/08/20 1600 99.6 °F (37.6 °C) 74 20 132/75 99 %   09/08/20 1500  82 21 145/65 99 %   09/08/20 1400  83 16 125/58 99 %   09/08/20 1300  81 20 136/62 99 %   09/08/20 1200 99.6 °F (37.6 °C) 92 (!) 39 138/67 100 %   09/08/20 1130  83 30  99 %   09/08/20 1106  74 24  98 %   09/08/20 1100  80 (!) 32 126/60 98 %   09/08/20 1000  70 24 129/64 98 %   09/08/20 0900  69 9 126/60 97 %     Oxygen Therapy  O2 Sat (%): 96 % (09/09/20 0836)  Pulse via Oximetry: 77 beats per minute (09/09/20 0836)  O2 Device: Nasal cannula (09/09/20 0836)  O2 Flow Rate (L/min): 2 l/min (09/09/20 0836)  FIO2 (%): 30 % (09/08/20 1106)    Estimated body mass index is 28.11 kg/m² as calculated from the following:    Height as of this encounter: 5' 7\" (1.702 m). Weight as of this encounter: 81.4 kg (179 lb 8 oz).       Intake/Output Summary (Last 24 hours) at 9/9/2020 0852  Last data filed at 9/9/2020 0540  Gross per 24 hour   Intake 4776.76 ml   Output 3020 ml   Net 1756.76 ml       *Note that automatically entered I/Os may not be accurate; dependent on patient compliance with collection and accurate  by happn. General:    Well nourished. Alert. CV:   RRR. No murmur, rub, or gallop. Lungs:   CTAB. No wheezing, rhonchi, or rales. Abdomen:   Soft, mild ttp, nondistended. Lap incision bandaged, GILDA drain x2 w serosanguinous drainage. Extremities: Warm and dry. No cyanosis or edema. Skin:     No rashes or jaundice. Neuro:  No gross focal deficits    Data Reviewed:  I have reviewed all labs, meds, and studies from the last 24 hours:  Recent Results (from the past 24 hour(s))   HGB & HCT    Collection Time: 09/08/20 10:02 AM   Result Value Ref Range    HGB 8.9 (L) 13.6 - 17.2 g/dL    HCT 26.9 (L) 41.1 - 50.3 %   GLUCOSE, POC    Collection Time: 09/08/20 12:53 PM   Result Value Ref Range    Glucose (POC) 157 (H) 65 - 100 mg/dL   GLUCOSE, POC    Collection Time: 09/08/20  4:58 PM   Result Value Ref Range    Glucose (POC) 134 (H) 65 - 100 mg/dL   HGB & HCT    Collection Time: 09/08/20  7:49 PM   Result Value Ref Range    HGB 8.3 (L) 13.6 - 17.2 g/dL    HCT 24.9 (L) 41.1 - 50.3 %   GLUCOSE, POC    Collection Time: 09/08/20  9:36 PM   Result Value Ref Range    Glucose (POC) 131 (H) 65 - 100 mg/dL   CBC WITH AUTOMATED DIFF    Collection Time: 09/09/20  3:43 AM   Result Value Ref Range    WBC 13.9 (H) 4.3 - 11.1 K/uL    RBC 2.79 (L) 4.23 - 5.6 M/uL    HGB 8.2 (L) 13.6 - 17.2 g/dL    HCT 25.0 (L) 41.1 - 50.3 %    MCV 89.6 79.6 - 97.8 FL    MCH 29.4 26.1 - 32.9 PG    MCHC 32.8 31.4 - 35.0 g/dL    RDW 16.6 (H) 11.9 - 14.6 %    PLATELET 99 (L) 316 - 450 K/uL    MPV 11.2 9.4 - 12.3 FL    ABSOLUTE NRBC 0.02 0.0 - 0.2 K/uL    DF AUTOMATED      NEUTROPHILS 79 (H) 43 - 78 %    LYMPHOCYTES 5 (L) 13 - 44 %    MONOCYTES 10 4.0 - 12.0 %    EOSINOPHILS 4 0.5 - 7.8 %    BASOPHILS 0 0.0 - 2.0 %    IMMATURE GRANULOCYTES 2 0.0 - 5.0 %    ABS. NEUTROPHILS 10.9 (H) 1.7 - 8.2 K/UL    ABS. LYMPHOCYTES 0.7 0.5 - 4.6 K/UL    ABS. MONOCYTES 1.4 (H) 0.1 - 1.3 K/UL    ABS. EOSINOPHILS 0.6 0.0 - 0.8 K/UL    ABS. BASOPHILS 0.0 0.0 - 0.2 K/UL    ABS. IMM. GRANS. 0.3 0.0 - 0.5 K/UL   METABOLIC PANEL, COMPREHENSIVE    Collection Time: 09/09/20  3:43 AM   Result Value Ref Range    Sodium 147 (H) 136 - 145 mmol/L    Potassium 3.9 3.5 - 5.1 mmol/L    Chloride 114 (H) 98 - 107 mmol/L    CO2 25 21 - 32 mmol/L    Anion gap 8 7 - 16 mmol/L    Glucose 125 (H) 65 - 100 mg/dL    BUN 20 8 - 23 MG/DL    Creatinine 1.13 0.8 - 1.5 MG/DL    GFR est AA >60 >60 ml/min/1.73m2    GFR est non-AA >60 >60 ml/min/1.73m2    Calcium 7.3 (L) 8.3 - 10.4 MG/DL    Bilirubin, total 2.8 (H) 0.2 - 1.1 MG/DL    ALT (SGPT) 19 12 - 65 U/L    AST (SGOT) 17 15 - 37 U/L    Alk.  phosphatase 36 (L) 50 - 136 U/L    Protein, total 4.3 (L) 6.3 - 8.2 g/dL    Albumin 1.9 (L) 3.2 - 4.6 g/dL    Globulin 2.4 2.3 - 3.5 g/dL    A-G Ratio 0.8 (L) 1.2 - 3.5     GLUCOSE, POC    Collection Time: 09/09/20  8:10 AM   Result Value Ref Range    Glucose (POC) 126 (H) 65 - 100 mg/dL        Current Meds:  Current Facility-Administered Medications   Medication Dose Route Frequency    insulin lispro (HUMALOG) injection 0-10 Units  0-10 Units SubCUTAneous AC&HS    dextrose 40% (GLUTOSE) oral gel 1 Tube  15 g Oral PRN    glucagon (GLUCAGEN) injection 1 mg  1 mg IntraMUSCular PRN    dextrose (D50W) injection syrg 12.5-25 g  25-50 mL IntraVENous PRN    lip protectant (BLISTEX) ointment 1 Each  1 Each Topical PRN    ondansetron (ZOFRAN) injection 4 mg  4 mg IntraVENous Q6H PRN    promethazine (PHENERGAN) with saline injection 12.5 mg  12.5 mg IntraVENous Q6H PRN    pantoprazole (PROTONIX) 40 mg in 0.9% sodium chloride 10 mL injection  40 mg IntraVENous Q12H    octreotide (SANDOSTATIN) 500 mcg in 0.9% sodium chloride 500 mL infusion  50 mcg/hr IntraVENous CONTINUOUS    acetaminophen (TYLENOL) suppository 650 mg  650 mg Rectal Q4H PRN    diphenhydrAMINE (BENADRYL) capsule 25 mg  25 mg Oral Q6H PRN    HYDROmorphone (PF) (DILAUDID) injection 0.5 mg 0.5 mg IntraVENous Q4H PRN    0.9% sodium chloride infusion 250 mL  250 mL IntraVENous PRN    HYDROmorphone (PF) (DILAUDID) injection 1 mg  1 mg IntraVENous Q2H PRN    piperacillin-tazobactam (ZOSYN) 4.5 g in 0.9% sodium chloride (MBP/ADV) 100 mL  4.5 g IntraVENous Q8H    lactated Ringers infusion  125 mL/hr IntraVENous CONTINUOUS       Other Studies:  No results found for this visit on 09/07/20. Xr Chest Sngl V    Result Date: 9/9/2020  Clinical history: Shortness of breath. TECHNIQUE: AP portable chest COMPARISON: September 8, 2020. FINDINGS: Previous endotracheal tube has been removed. Left IJ line, right chest port and enteric tube are stable. Heart is enlarged. Mediastinal contour is within normal limits. Bibasilar opacities, likely atelectasis. No pneumothorax. IMPRESSION: 1. Status post extubation. 2. Stable bibasilar atelectasis. Negative for pneumothorax.       All Micro Results     None          SARS-CoV-2 Lab Results  \"Novel Coronavirus\" Test: No results found for: COV2NT   \"Emergent Disease\" Test: No results found for: EDPR  \"SARS-COV-2\" Test: No results found for: XGCOVT  \"Precision Labs\" Test: No results found for: RSLT  Rapid Test:   Lab Results   Component Value Date/Time    COVR Not detected 08/24/2020 03:45 PM            Assessment and Plan:     Hospital Problems as of 9/9/2020 Date Reviewed: 8/31/2020          Codes Class Noted - Resolved POA    Hypovolemic shock ([facility] [address] ICD-10-CM: R57.1  ICD-9-CM: 785.59  9/8/2020 - Present Unknown        Respiratory failure, post-operative ([facility] [address] ICD-10-CM: Q50.344  ICD-9-CM: 518.51  9/8/2020 - Present Unknown        Duodenal ulcer with hemorrhage ICD-10-CM: K26.4  ICD-9-CM: 532.40  9/8/2020 - Present Unknown        Pancreatitis ICD-10-CM: K85.90  ICD-9-CM: 853.8  9/7/2020 - Present Yes        * (Principal) Acute blood loss anemia ICD-10-CM: D62  ICD-9-CM: 285.1  9/2/2020 - Present Unknown        [facility] ICD-10-CM: C25.9  ICD-9-CM: 157.9 9/2/2020 - Present Yes        Duodenal mass ICD-10-CM: K31.89  ICD-9-CM: 537.89  9/2/2020 - Present Yes        Iron deficiency anemia ICD-10-CM: D50.9  ICD-9-CM: 280.9  9/1/2020 - Present Yes        Malignant neoplasm of head of pancreas ([facility] [address] ICD-10-CM: C25.0  ICD-9-CM: 157.0  8/31/2020 - Present Yes        Dietz's esophagus without dysplasia ICD-10-CM: K22.70  ICD-9-CM: 530.85  8/12/2020 - Present Yes        Essential hypertension ICD-10-CM: I10  ICD-9-CM: 401.9  5/8/2018 - Present Yes        Mixed hyperlipidemia ICD-10-CM: E78.2  ICD-9-CM: 272.2  5/8/2018 - Present Yes              Plan:  # Acute blood loss anemia and hypovolemic shock secondary to bleeding duodenal ulcer              - Shock and bleeding resolved. Off pressors, Hb stable. Diet/NGT per surgery.              - Recent dx of metastatic pancreatic cancer. EGD showed duodenal ulcer. Had mesenteric arteriogram 9/3 with coiling. Re-bled, to OR on 9/7 for ex-lap. Left intubated post-op, extubated 9/8. GI, surgery, Heme/onc following. Con't PPI, abx, octreotide.     # JULIAN              - Resolved.     # HTN              - Hold meds.     # Metastatic pancreatic adenocarcinoma    DC planning/Dispo: Per primary. Diet:  DIET NPO  DVT ppx: SCDs only.     Signed:  Crystal Aquino MD

## 2020-09-09 NOTE — PROGRESS NOTES
TRANSFER - OUT REPORT:    Verbal report given to [address] on [employer]  being transferred to 2nd floor for routine post - op       Report consisted of patients Situation, Background, Assessment and   Recommendations(SBAR). Information from the following report(s) SBAR, Kardex, OR Summary, Intake/Output, MAR, Recent Results, Med Rec Status and Cardiac Rhythm NSR with PAC's was reviewed with the receiving nurse. Lines:   Quad Lumen 09/07/20 Left (Active)   Central Line Being Utilized Yes 09/09/20 1013   Criteria for Appropriate Use Hemodynamically unstable, requiring monitoring lines, vasopressors, or volume resuscitation 09/09/20 1013   Site Assessment Clean, dry, & intact 09/09/20 1013   Infiltration Assessment 0 09/09/20 1013   Affected Extremity/Extremities Color distal to insertion site pink (or appropriate for race) 09/09/20 1013   Date of Last Dressing Change 09/08/20 09/09/20 1013   Dressing Status Clean, dry, & intact; Old drainage 09/09/20 1013   Dressing Type Disk with Chlorhexadine gluconate (CHG); Transparent 09/09/20 1013   Action Taken Other (comment) 09/09/20 1013   Proximal Hub Color/Line Status White; Infusing;Patent 09/09/20 0330   Positive Blood Return (Medial Site) Yes 09/09/20 0330   Medial 1 Hub Color/Line Status Oneil Inman; Infusing;Patent 09/09/20 0330   Positive Blood Return (Lateral Site) Yes 09/09/20 0330   Medial 2 Hub Color/Line Status Blue; Infusing;Patent 09/09/20 0330   Positive Blood Return (Site #3) Yes 09/09/20 0330   Distal Hub Color/Line Status Brown;Flushed;Patent 09/09/20 0330   Positive Blood Return (Site #4) Yes 09/09/20 0330   External Catheter Length (cm) 0 centimeters 09/08/20 0958   Alcohol Cap Used No 09/09/20 1013       Peripheral IV 09/07/20 Left;Upper Arm (Active)   Site Assessment Clean, dry, & intact 09/09/20 0330   Phlebitis Assessment 0 09/09/20 0330   Infiltration Assessment 0 09/09/20 0330   Dressing Status Clean, dry, & intact 09/09/20 0330 Dressing Type Transparent;Tape 09/09/20 0330   Hub Color/Line Status Pink;Capped 09/09/20 0330   Alcohol Cap Used No 09/09/20 0330        Opportunity for questions and clarification was provided.       Patient transported with:   Registered Nurse

## 2020-09-09 NOTE — PROGRESS NOTES
Physical Therapy Note:    Participated in interdisciplinary rounds in ICU/CCU and chart reviewed. Patient is experiencing decrease in function from baseline. Patient would benefit from skilled acute therapy to increase independence with self care/ADLs, strength, endurance, and functional mobility. Recommend PT/OT consult when medically stable and MD agrees.     Thank you for your consideration,  Donita Eagle, PT, DPT

## 2020-09-09 NOTE — PROGRESS NOTES
A follow up visit was made to the patient. Emotional support, spiritual presence and   prayer were provided for the patient. He is now on room air. He said that he is feeling much better. His nurse shared that he may be moved out of the unit today.       Denver Meigs, MDIV Chaplain

## 2020-09-09 NOTE — PROGRESS NOTES
PLAN:  NGT to LIWS  Diet NPO  IVFs  SCD/IS/Protonix  for prophylactic measures  Ok for OOB to chair  Pain/nausea control  Monitor labs  Monitor drain output  Drain care  Ok to leave incision GELACIO  Leave mcintyre for now     ASSESSMENT:  Admit Date: 9/7/2020   2 Days Post-Op  Procedure(s):  LAPAROTOMY EXPLORATORY /  CHOLECYSTECTOMY    Principal Problem:    Acute blood loss anemia (9/2/2020)    Active Problems:    Essential hypertension (5/8/2018)      Mixed hyperlipidemia (5/8/2018)      Dietz's esophagus without dysplasia (8/12/2020)      Malignant neoplasm of head of pancreas ([address] (8/31/2020)      Iron deficiency anemia (9/1/2020)      Pancreatic cancer ([address] (9/2/2020)      Duodenal mass (9/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 Mcintyre -380mL UOP/24h. NGT with 100mL out. Left GILDA with 90mL serosanguinous otuput;  right GILDA with 170mL bilious output (drains around duodenal stump). WBC 14, H/H 9.7/28. Cr 1.87. BS 200s. On Fentanyl and Octreotide gtts. LFTs pending today. 9/9/2020  POD#2  Extubated yesterday. Alert in bed w/o complaints. Oxygenating well on 2L NC; congested. NGT with 750mL out; dark/bilious. Left GILDA with 45mL serosanguinous otuput;  right GILDA with 110mL serous/bile tinged output (drains around duodenal stump). WBC 13.9, H/H 8.2/25. Cr 1.1. Tbili 2.8.      Intake/Output Summary (Last 24 hours) at 9/9/2020 1133  Last data filed at 9/9/2020 0540  Gross per 24 hour   Intake 4746.76 ml   Output 3020 ml   Net 1726.76 ml     OBJECTIVE:  Constitutional: Alert oriented cooperative patient in no acute distress; appears stated age   Visit Vitals  /66   Pulse 70   Temp 99.3 °F (37.4 °C)   Resp 16   Ht 5' 7\" (1.702 m)   Wt 179 lb 8 oz (81.4 kg)   SpO2 96%   BMI 28.11 kg/m² Eyes:Sclera are clear. ENMT: no external lesions gross hearing normal; no obvious neck masses, no ear or lip lesions, +NGT  CV: RRR. Normal perfusion  Resp: No JVD. Breathing is  non-labored; no audible wheezing. GI: soft and non-distended, dressing removed, incision c/d/i with staples, GILDA bilat  : mcintyre intact with blood tinged urine  Musculoskeletal: unremarkable with normal function. No embolic signs or cyanosis.    Neuro:  Oriented; moves all 4; no focal deficits  Psychiatric: normal affect and mood, no memory impairment      Patient Vitals for the past 24 hrs:   BP Temp Pulse Resp SpO2 Weight   09/09/20 0836     96 %    09/09/20 0629 142/66  70 16 100 %    09/09/20 0600 133/62  71 19 100 %    09/09/20 0530      179 lb 8 oz (81.4 kg)   09/09/20 0529 145/71  79 17 99 %    09/09/20 0502 133/63  74 25 100 %    09/09/20 0429 149/67  79 27 99 %    09/09/20 0400 132/77  74 30 98 %    09/09/20 0330 141/64 99.3 °F (37.4 °C) 73 18 100 %    09/09/20 0300 162/73  72 18 100 %    09/09/20 0229 138/64  79 20 99 %    09/09/20 0200 145/67  81 23 99 %    09/09/20 0130 144/72  83 23 100 %    09/09/20 0100 145/67  80 21 99 %    09/09/20 0029 141/66  80 28 98 %    09/09/20 0000 145/67  81 (!) 31 99 %    09/08/20 2330 154/71 99.4 °F (37.4 °C) 78 22 99 %    09/08/20 2300 161/74  81 21 99 %    09/08/20 2229 154/76  79 24 100 %    09/08/20 2200 142/66  80 25 99 %    09/08/20 2129 136/67  84 27 100 %    09/08/20 2100 141/61  76 19 100 %    09/08/20 2029 137/62  81 18 99 %    09/08/20 2000 138/63  85 24 99 %    09/08/20 1936 145/67 99.1 °F (37.3 °C) 85 30 98 % 179 lb 3.2 oz (81.3 kg)   09/08/20 1900 138/63  81 (!) 32 98 %    09/08/20 1800 139/67  97 27 99 %    09/08/20 1700 139/63  83 9 99 %    09/08/20 1600 132/75 99.6 °F (37.6 °C) 74 20 99 %    09/08/20 1500 145/65  82 21 99 %    09/08/20 1400 125/58  83 16 99 %    09/08/20 1300 136/62  81 20 99 %    09/08/20 1200 138/67 99.6 °F (37.6 °C) 92 (!) 39 100 %      Labs:    Recent Labs     09/09/20  0343  09/07/20  1600  09/07/20  0046   WBC 13.9*   < > 11.4*  --  16.6*   HGB 8.2*   < > 9.6*   < > 6.7*   PLT 99*   < > 92*  --  185   *   < > 146*  --  140   K 3.9   < > 4.4  --  3.9   *   < > 117*  --  110*   CO2 25   < > 24  --  23   BUN 20   < > 18  --  16   CREA 1.13   < > 1.06  --  0.92   *   < > 197*  --  170*   PTP  --   --  17.7*   < >  --    INR  --   --  1.4   < >  --    APTT  --   --  32.9   < >  --    TBILI 2.8*   < >  --   --  0.5   ALT 19   < >  --   --  20   AP 36*   < >  --   --  96   LPSE  --   --   --   --  1,204*    < > = values in this interval not displayed.        Signed:  Denise Mckay, ALINA

## 2020-09-09 NOTE — PROGRESS NOTES
Bedside, Verbal and Written shift change report given to Emre Medley RN (oncoming nurse) by Jaye Conklin RN (offgoing nurse). Report included the following information SBAR, Kardex, ED Summary, OR Summary, Procedure Summary, Intake/Output, MAR, Recent Results, Med Rec Status, Cardiac Rhythm SR, Alarm Parameters  and Quality Measures.

## 2020-09-09 NOTE — PROGRESS NOTES
Critical Care Daily Progress Note: 9/9/2020  Admission Date: 9/7/2020     The patient's chart is reviewed and the patient is discussed with the staff. 60 y.o.  male seen and evaluated at the request of Dr. Jareth Benoit. He has a history of pancreatic cancer with duodenal ulcerated lesion with hepatic mets. He was just admitted 9/2-/96 with hematemesis and had IR embolization of the mass. However, came back with BRBPR. Today a rapid response was called due to the patient having multiple large bloody BP's followed by hematemesis and hypotension. He required a large number of blood products and was taken emergently to the OR where he underwent ex lap with resection of distal stomach and proximal duodenum with oversewing of bleeding vessel in the second portion fo the duodenum. He was brought to the ICU intubated and hypertensive. He was given 10 of IV labetalol with BP still near 200.     Subjective:     Patient seen resting in bed, denies pain or distress  On 2L O2 NC, extubated yesterday  BP stable  Hgb 8.2 today    Current Facility-Administered Medications   Medication Dose Route Frequency    insulin lispro (HUMALOG) injection 0-10 Units  0-10 Units SubCUTAneous AC&HS    dextrose 40% (GLUTOSE) oral gel 1 Tube  15 g Oral PRN    glucagon (GLUCAGEN) injection 1 mg  1 mg IntraMUSCular PRN    dextrose (D50W) injection syrg 12.5-25 g  25-50 mL IntraVENous PRN    lip protectant (BLISTEX) ointment 1 Each  1 Each Topical PRN    ondansetron (ZOFRAN) injection 4 mg  4 mg IntraVENous Q6H PRN    promethazine (PHENERGAN) with saline injection 12.5 mg  12.5 mg IntraVENous Q6H PRN    pantoprazole (PROTONIX) 40 mg in 0.9% sodium chloride 10 mL injection  40 mg IntraVENous Q12H    octreotide (SANDOSTATIN) 500 mcg in 0.9% sodium chloride 500 mL infusion  50 mcg/hr IntraVENous CONTINUOUS    acetaminophen (TYLENOL) suppository 650 mg  650 mg Rectal Q4H PRN    diphenhydrAMINE (BENADRYL) capsule 25 mg  25 mg Oral Q6H PRN    HYDROmorphone (PF) (DILAUDID) injection 0.5 mg  0.5 mg IntraVENous Q4H PRN    0.9% sodium chloride infusion 250 mL  250 mL IntraVENous PRN    HYDROmorphone (PF) (DILAUDID) injection 1 mg  1 mg IntraVENous Q2H PRN    piperacillin-tazobactam (ZOSYN) 4.5 g in 0.9% sodium chloride (MBP/ADV) 100 mL  4.5 g IntraVENous Q8H    lactated Ringers infusion  125 mL/hr IntraVENous CONTINUOUS       Review of Systems  Review of Systems   Constitutional: Negative for chills, diaphoresis and fever. HENT: Negative for congestion, nosebleeds and sore throat. Respiratory: Positive for cough. Negative for hemoptysis, shortness of breath and wheezing. Cardiovascular: Negative for chest pain, palpitations and leg swelling. Gastrointestinal: Negative for heartburn, nausea and vomiting. Neurological: Negative for dizziness, tingling and headaches. Psychiatric/Behavioral: Negative for depression, hallucinations and suicidal ideas. Objective:     Vitals:    09/09/20 0530 09/09/20 0600 09/09/20 0629 09/09/20 0836   BP:  133/62 142/66    Pulse:  71 70    Resp:  19 16    Temp:       SpO2:  100% 100% 96%   Weight: 179 lb 8 oz (81.4 kg)      Height:             Intake/Output Summary (Last 24 hours) at 9/9/2020 0838  Last data filed at 9/9/2020 0540  Gross per 24 hour   Intake 4776.76 ml   Output 3020 ml   Net 1756.76 ml         Physical Exam:          Constitutional:  intubated and mechanically ventilated.   EENMT:  Sclera clear, pupils equal, oral mucosa moist  Respiratory:  Rhonchi in upper, on 2L O2 NC  Cardiovascular:  RRR with no M,G,R;  Gastrointestinal:  soft with no tenderness; positive bowel sounds present  Musculoskeletal:  warm with no cyanosis, no lower extremity edema  Skin:  no jaundice or ecchymosis  Neurologic: no gross neuro deficits     Psychiatric:  alert and  responsive    LINES: Peripheral IVs, NGT, Soriano catheter, GILDA drain X 2    DRIPS:  Octreotide, LR    CXR: 9/9/2020            ABG:   Recent Labs     09/08/20  0346 09/07/20  1815 09/07/20  1611   PHI 7.39 7.33* 7.42   PCO2I 31.0* 41.5 35.1   PO2I 103* 202* 345*   HCO3I 18.9* 21.7* 22.9       LAB  Recent Labs     09/09/20  0810 09/08/20  2136 09/08/20  1658 09/08/20  1253 09/07/20  1611   GLUCPOC 126* 131* 134* 157* 203*     Recent Labs     09/09/20  0343 09/08/20  1949 09/08/20  1002 09/08/20  0146 09/07/20 2002 09/07/20  1600  09/07/20  0153   WBC 13.9*  --   --  14.9* 17.0* 11.4*  --   --    HGB 8.2* 8.3* 8.9* 9.7* 12.5* 9.6*   < >  --    HCT 25.0* 24.9* 26.9* 28.9* 36.4* 28.1*   < >  --    PLT 99*  --   --  92* 107* 92*  --   --    INR  --   --   --   --   --  1.4  --  1.7    < > = values in this interval not displayed. Recent Labs     09/09/20 0343 09/08/20  0344 09/07/20 2002 09/07/20  0046   * 147* 146*   < > 140   K 3.9 4.3 4.4   < > 3.9   * 117* 119*   < > 110*   CO2 25 21 22   < > 23   * 153* 168*   < > 170*   BUN 20 23 19   < > 16   CREA 1.13 1.87* 1.19   < > 0.92   CA 7.3* 7.2* 7.4*   < > 7.7*   ALB 1.9*  --  1.9*  --  1.8*    < > = values in this interval not displayed. No results for input(s): LCAD, LAC in the last 72 hours. Patient Active Problem List   Diagnosis Code    Impaired fasting glucose R73.01    Essential hypertension I10    Mixed hyperlipidemia E78.2    PAC (premature atrial contraction) I49.1    Overweight (BMI 25.0-29. 9) [id number]    Duodenal ulcer K26.9    Dietz's esophagus without dysplasia K22.70    Malignant neoplasm of head of pancreas (HCC) C25.0    Iron deficiency anemia D50.9    Hematemesis K92.0    Acute blood loss anemia D62    Pancreatic cancer (HCC) C25.9    Hypotension I95.9    Duodenal mass K31.89    Pancreatitis K85.90    Hypovolemic shock (HCC) R57.1    Respiratory failure, post-operative (HCC) J95.821    Duodenal ulcer with hemorrhage K26.4         Assessment:  (Medical Decision Making)     Hospital Problems  Date Reviewed: 8/31/2020          Codes Class Noted POA    Hypovolemic shock ([facility] ICD-10-CM: R57.1  ICD-9-CM: 785.59  9/8/2020 Unknown        Respiratory failure, post-operative ([facility] ICD-10-CM: M51.690  ICD-9-CM: 518.51  9/8/2020 Unknown        Duodenal ulcer with hemorrhage ICD-10-CM: K26.4  ICD-9-CM: 532.40  9/8/2020 Unknown        Pancreatitis ICD-10-CM: K85.90  ICD-9-CM: 054.1  9/7/2020 Yes        * (Principal) Acute blood loss anemia ICD-10-CM: D62  ICD-9-CM: 285.1  9/2/2020 Unknown        Pancreatic cancer ([facility] ICD-10-CM: C25.9  ICD-9-CM: 157.9  9/2/2020 Yes        Duodenal mass ICD-10-CM: K31.89  ICD-9-CM: 537.89  9/2/2020 Yes        Iron deficiency anemia ICD-10-CM: D50.9  ICD-9-CM: 280.9  9/1/2020 Yes        Malignant neoplasm of head of pancreas ([facility] ICD-10-CM: C25.0  ICD-9-CM: 157.0  8/31/2020 Yes        Dietz's esophagus without dysplasia ICD-10-CM: K22.70  ICD-9-CM: 530.85  8/12/2020 Yes        Essential hypertension ICD-10-CM: I10  ICD-9-CM: 401.9  5/8/2018 Yes        Mixed hyperlipidemia ICD-10-CM: E78.2  ICD-9-CM: 272.2  5/8/2018 Yes              Plan:  (Medical Decision Making)     --Continue supplemental O2, wean as tolerated  --On Zosyn IV  --Continue to monitor Hgb  --SCDs for DVT prophylaxis  --On Protonix IV Q12H  --Full code      More than 50% of the time documented was spent in face-to-face contact with the patient and in the care of the patient on the floor/unit where the patient is located. Nerissa Gomes NP   Lungs:   clear  Heart:  RRR with no Murmur/Rubs/Gallops    Additional Comments:  Can move to floor- surgery is primary    I have spoken with and examined the patient. I agree with the above assessment and plan as documented.     Qian Chatman MD

## 2020-09-10 ENCOUNTER — PATIENT OUTREACH (OUTPATIENT)
Dept: CASE MANAGEMENT | Age: 65
End: 2020-09-10

## 2020-09-10 ENCOUNTER — APPOINTMENT (OUTPATIENT)
Dept: GENERAL RADIOLOGY | Age: 65
DRG: 326 | End: 2020-09-10
Attending: INTERNAL MEDICINE
Payer: COMMERCIAL

## 2020-09-10 LAB
ALBUMIN SERPL-MCNC: 1.8 G/DL (ref 3.2–4.6)
ALBUMIN/GLOB SERPL: 0.6 {RATIO} (ref 1.2–3.5)
ALP SERPL-CCNC: 51 U/L (ref 50–136)
ALT SERPL-CCNC: 25 U/L (ref 12–65)
ANION GAP SERPL CALC-SCNC: 7 MMOL/L (ref 7–16)
AST SERPL-CCNC: 26 U/L (ref 15–37)
BASOPHILS # BLD: 0 K/UL (ref 0–0.2)
BASOPHILS NFR BLD: 0 % (ref 0–2)
BILIRUB SERPL-MCNC: 5.1 MG/DL (ref 0.2–1.1)
BUN SERPL-MCNC: 19 MG/DL (ref 8–23)
CALCIUM SERPL-MCNC: 8 MG/DL (ref 8.3–10.4)
CHLORIDE SERPL-SCNC: 113 MMOL/L (ref 98–107)
CO2 SERPL-SCNC: 26 MMOL/L (ref 21–32)
CREAT SERPL-MCNC: 0.9 MG/DL (ref 0.8–1.5)
DIFFERENTIAL METHOD BLD: ABNORMAL
EOSINOPHIL # BLD: 1 K/UL (ref 0–0.8)
EOSINOPHIL NFR BLD: 7 % (ref 0.5–7.8)
ERYTHROCYTE [DISTWIDTH] IN BLOOD BY AUTOMATED COUNT: 17.2 % (ref 11.9–14.6)
GLOBULIN SER CALC-MCNC: 2.8 G/DL (ref 2.3–3.5)
GLUCOSE BLD STRIP.AUTO-MCNC: 102 MG/DL (ref 65–100)
GLUCOSE BLD STRIP.AUTO-MCNC: 112 MG/DL (ref 65–100)
GLUCOSE BLD STRIP.AUTO-MCNC: 113 MG/DL (ref 65–100)
GLUCOSE BLD STRIP.AUTO-MCNC: 171 MG/DL (ref 65–100)
GLUCOSE SERPL-MCNC: 104 MG/DL (ref 65–100)
HCT VFR BLD AUTO: 25.6 % (ref 41.1–50.3)
HGB BLD-MCNC: 8.1 G/DL (ref 13.6–17.2)
IMM GRANULOCYTES # BLD AUTO: 0.3 K/UL (ref 0–0.5)
IMM GRANULOCYTES NFR BLD AUTO: 2 % (ref 0–5)
LYMPHOCYTES # BLD: 0.6 K/UL (ref 0.5–4.6)
LYMPHOCYTES NFR BLD: 4 % (ref 13–44)
MAGNESIUM SERPL-MCNC: 2.2 MG/DL (ref 1.8–2.4)
MCH RBC QN AUTO: 29.2 PG (ref 26.1–32.9)
MCHC RBC AUTO-ENTMCNC: 31.6 G/DL (ref 31.4–35)
MCV RBC AUTO: 92.4 FL (ref 79.6–97.8)
MONOCYTES # BLD: 1.3 K/UL (ref 0.1–1.3)
MONOCYTES NFR BLD: 9 % (ref 4–12)
NEUTS SEG # BLD: 11.6 K/UL (ref 1.7–8.2)
NEUTS SEG NFR BLD: 78 % (ref 43–78)
NRBC # BLD: 0.02 K/UL (ref 0–0.2)
PLATELET # BLD AUTO: 148 K/UL (ref 150–450)
PMV BLD AUTO: 11.8 FL (ref 9.4–12.3)
POTASSIUM SERPL-SCNC: 3.6 MMOL/L (ref 3.5–5.1)
PROT SERPL-MCNC: 4.6 G/DL (ref 6.3–8.2)
RBC # BLD AUTO: 2.77 M/UL (ref 4.23–5.6)
SODIUM SERPL-SCNC: 146 MMOL/L (ref 136–145)
WBC # BLD AUTO: 14.8 K/UL (ref 4.3–11.1)

## 2020-09-10 PROCEDURE — 83735 ASSAY OF MAGNESIUM: CPT

## 2020-09-10 PROCEDURE — 74011000258 HC RX REV CODE- 258: Performed by: SURGERY

## 2020-09-10 PROCEDURE — 74011250636 HC RX REV CODE- 250/636: Performed by: INTERNAL MEDICINE

## 2020-09-10 PROCEDURE — 80053 COMPREHEN METABOLIC PANEL: CPT

## 2020-09-10 PROCEDURE — 74011000250 HC RX REV CODE- 250: Performed by: INTERNAL MEDICINE

## 2020-09-10 PROCEDURE — 74011000250 HC RX REV CODE- 250: Performed by: NURSE PRACTITIONER

## 2020-09-10 PROCEDURE — C9113 INJ PANTOPRAZOLE SODIUM, VIA: HCPCS | Performed by: INTERNAL MEDICINE

## 2020-09-10 PROCEDURE — 71045 X-RAY EXAM CHEST 1 VIEW: CPT

## 2020-09-10 PROCEDURE — 94640 AIRWAY INHALATION TREATMENT: CPT

## 2020-09-10 PROCEDURE — 77010033678 HC OXYGEN DAILY

## 2020-09-10 PROCEDURE — 99232 SBSQ HOSP IP/OBS MODERATE 35: CPT | Performed by: INTERNAL MEDICINE

## 2020-09-10 PROCEDURE — 94760 N-INVAS EAR/PLS OXIMETRY 1: CPT

## 2020-09-10 PROCEDURE — 65660000000 HC RM CCU STEPDOWN

## 2020-09-10 PROCEDURE — 74011000250 HC RX REV CODE- 250: Performed by: SURGERY

## 2020-09-10 PROCEDURE — 85025 COMPLETE CBC W/AUTO DIFF WBC: CPT

## 2020-09-10 PROCEDURE — 74011250636 HC RX REV CODE- 250/636: Performed by: SURGERY

## 2020-09-10 PROCEDURE — 97530 THERAPEUTIC ACTIVITIES: CPT

## 2020-09-10 PROCEDURE — 97166 OT EVAL MOD COMPLEX 45 MIN: CPT

## 2020-09-10 PROCEDURE — 74011636637 HC RX REV CODE- 636/637: Performed by: PHYSICIAN ASSISTANT

## 2020-09-10 PROCEDURE — 82962 GLUCOSE BLOOD TEST: CPT

## 2020-09-10 PROCEDURE — 97161 PT EVAL LOW COMPLEX 20 MIN: CPT

## 2020-09-10 RX ORDER — ALBUTEROL SULFATE 0.83 MG/ML
2.5 SOLUTION RESPIRATORY (INHALATION)
Status: DISCONTINUED | OUTPATIENT
Start: 2020-09-10 | End: 2020-09-16 | Stop reason: HOSPADM

## 2020-09-10 RX ADMIN — INSULIN LISPRO 2 UNITS: 100 INJECTION, SOLUTION INTRAVENOUS; SUBCUTANEOUS at 21:54

## 2020-09-10 RX ADMIN — SODIUM CHLORIDE, SODIUM LACTATE, POTASSIUM CHLORIDE, AND CALCIUM CHLORIDE 125 ML/HR: 600; 310; 30; 20 INJECTION, SOLUTION INTRAVENOUS at 16:11

## 2020-09-10 RX ADMIN — HYDROMORPHONE HYDROCHLORIDE 1 MG: 1 INJECTION, SOLUTION INTRAMUSCULAR; INTRAVENOUS; SUBCUTANEOUS at 20:54

## 2020-09-10 RX ADMIN — MAGNESIUM SULFATE HEPTAHYDRATE: 500 INJECTION, SOLUTION INTRAMUSCULAR; INTRAVENOUS at 17:21

## 2020-09-10 RX ADMIN — ALBUTEROL SULFATE 2.5 MG: 2.5 SOLUTION RESPIRATORY (INHALATION) at 19:20

## 2020-09-10 RX ADMIN — OCTREOTIDE ACETATE 50 MCG/HR: 500 INJECTION, SOLUTION INTRAVENOUS; SUBCUTANEOUS at 10:05

## 2020-09-10 RX ADMIN — SODIUM CHLORIDE 40 MG: 9 INJECTION INTRAMUSCULAR; INTRAVENOUS; SUBCUTANEOUS at 20:54

## 2020-09-10 RX ADMIN — SODIUM CHLORIDE, SODIUM LACTATE, POTASSIUM CHLORIDE, AND CALCIUM CHLORIDE 125 ML/HR: 600; 310; 30; 20 INJECTION, SOLUTION INTRAVENOUS at 07:47

## 2020-09-10 RX ADMIN — SODIUM CHLORIDE SOLN NEBU 3% 4 ML: 3 NEBU SOLN at 07:32

## 2020-09-10 RX ADMIN — PIPERACILLIN AND TAZOBACTAM 4.5 G: 4; .5 INJECTION, POWDER, FOR SOLUTION INTRAVENOUS at 05:38

## 2020-09-10 RX ADMIN — PIPERACILLIN AND TAZOBACTAM 4.5 G: 4; .5 INJECTION, POWDER, FOR SOLUTION INTRAVENOUS at 21:54

## 2020-09-10 RX ADMIN — PIPERACILLIN AND TAZOBACTAM 4.5 G: 4; .5 INJECTION, POWDER, FOR SOLUTION INTRAVENOUS at 13:32

## 2020-09-10 RX ADMIN — SODIUM CHLORIDE 40 MG: 9 INJECTION INTRAMUSCULAR; INTRAVENOUS; SUBCUTANEOUS at 08:36

## 2020-09-10 RX ADMIN — OCTREOTIDE ACETATE 50 MCG/HR: 500 INJECTION, SOLUTION INTRAVENOUS; SUBCUTANEOUS at 20:24

## 2020-09-10 RX ADMIN — SODIUM CHLORIDE SOLN NEBU 3% 4 ML: 3 NEBU SOLN at 19:20

## 2020-09-10 RX ADMIN — HYDROMORPHONE HYDROCHLORIDE 1 MG: 1 INJECTION, SOLUTION INTRAMUSCULAR; INTRAVENOUS; SUBCUTANEOUS at 05:45

## 2020-09-10 NOTE — PROGRESS NOTES
Inpatient Hematology / Oncology Progress Note    Reason for Consult:  Acute blood loss anemia [D62]; Duodenal mass [K31.89]  Referring Physician:  Memo Varela MD    24 Hour Events:  POD #3 - doing well  Moved out of ICU yesterday  No reported bleeding  Hgb stable at 8.1  VSS, afebrile        ROS:  Constitutional: Negative for fever, chills, weakness, malaise, fatigue. CV: Negative for chest pain, palpitations, edema. Respiratory: Negative for dyspnea, cough, wheezing. GI: +incisional/post surgical abdominal pain. Negative for nausea, diarrhea. 10 point review of systems is otherwise negative with the exception of the elements mentioned above in the HPI.        Allergies   Allergen Reactions    Tetanus And Diphtheria Toxoids Swelling     As child     Past Medical History:   Diagnosis Date    Cancer ([facility]     pancreatic    GERD (gastroesophageal reflux disease)     Hernia      Past Surgical History:   Procedure Laterality Date    HX CATARACT REMOVAL Left 12/15/2017    HX HERNIA REPAIR      HX ORTHOPAEDIC Right     tibia- with hardware    IR INSERT TUNL CVC W PORT OVER 5 YEARS  2020    IR OCCL TXCATH HEMMORAGE W SI  9/3/2020     Family History   Problem Relation Age of Onset    No Known Problems Mother     Cancer Father 72         leukemia    Diabetes Sister     No Known Problems Brother     No Known Problems Sister     Cancer Sister      Social History     Socioeconomic History    Marital status:      Spouse name: Not on file    Number of children: Not on file    Years of education: Not on file    Highest education level: Not on file   Occupational History    Not on file   Social Needs    Financial resource strain: Not on file    Food insecurity     Worry: Not on file     Inability: Not on file    Transportation needs     Medical: Not on file     Non-medical: Not on file   Tobacco Use    Smoking status: Former Smoker     Packs/day: 1.00     Years: 10.00     Pack years: 10.00     Last attempt to quit: 2006     Years since quittin.6    Smokeless tobacco: Never Used   Substance and Sexual Activity    Alcohol use: Not Currently    Drug use: Never    Sexual activity: Not on file   Lifestyle    Physical activity     Days per week: Not on file     Minutes per session: Not on file    Stress: Not on file   Relationships    Social connections     Talks on phone: Not on file     Gets together: Not on file     Attends Protestant service: Not on file     Active member of club or organization: Not on file     Attends meetings of clubs or organizations: Not on file     Relationship status: Not on file    Intimate partner violence     Fear of current or ex partner: Not on file     Emotionally abused: Not on file     Physically abused: Not on file     Forced sexual activity: Not on file   Other Topics Concern    Not on file   Social History Narrative    Not on file     Current Facility-Administered Medications   Medication Dose Route Frequency Provider Last Rate Last Dose    sodium chloride 3% hypertonic nebulizer soln  4 mL Nebulization BID Darylene Halsted, MD   4 mL at 09/10/20 0732    insulin lispro (HUMALOG) injection 0-10 Units  0-10 Units SubCUTAneous AC&HS Rehana Regulus KELY PA   Stopped at 20 1630    dextrose 40% (GLUTOSE) oral gel 1 Tube  15 g Oral PRN Rehana Carbajal I, PA        glucagon (GLUCAGEN) injection 1 mg  1 mg IntraMUSCular PRN FAROOQ Mane        dextrose (D50W) injection syrg 12.5-25 g  25-50 mL IntraVENous PRN Rehana Carbajal I PA        lip protectant (BLISTEX) ointment 1 Each  1 Each Topical PRN Darylene Halsted, MD   1 Each at 20 2350    ondansetron (ZOFRAN) injection 4 mg  4 mg IntraVENous Q6H PRN Efe Schwarz MD   4 mg at 20 1335    promethazine (PHENERGAN) with saline injection 12.5 mg  12.5 mg IntraVENous Q6H PRN Efe Schwarz MD        pantoprazole (PROTONIX) 40 mg in 0.9% sodium chloride 10 mL injection  40 mg IntraVENous Q12H Henri Beckford MD   40 mg at 09/10/20 0836    octreotide (SANDOSTATIN) 500 mcg in 0.9% sodium chloride 500 mL infusion  50 mcg/hr IntraVENous CONTINUOUS Henri Beckford MD 50 mL/hr at 20 2231 50 mcg/hr at 20 2231    acetaminophen (TYLENOL) suppository 650 mg  650 mg Rectal Q4H PRN Henri Beckford MD        diphenhydrAMINE (BENADRYL) capsule 25 mg  25 mg Oral Q6H PRN Henri Beckford MD        HYDROmorphone (PF) (DILAUDID) injection 0.5 mg  0.5 mg IntraVENous E9T PRN Lakeisha Andrea MD        0.9% sodium chloride infusion 250 mL  250 mL IntraVENous PRN Deondre Yorkine NOE, DO        HYDROmorphone (PF) (DILAUDID) injection 1 mg  1 mg IntraVENous N6G PRN Lakeisha Andrea MD   1 mg at 09/10/20 0545    piperacillin-tazobactam (ZOSYN) 4.5 g in 0.9% sodium chloride (MBP/ADV) 100 mL  4.5 g IntraVENous A8V Lakeisha Andrea MD 25 mL/hr at 09/10/20 0538 4.5 g at 09/10/20 0538    lactated Ringers infusion  125 mL/hr IntraVENous CONTINUOUS Dania Yap  mL/hr at 09/10/20 0747 125 mL/hr at 09/10/20 0747       OBJECTIVE:  Patient Vitals for the past 8 hrs:   BP Temp Pulse Resp SpO2   09/10/20 0738 137/63 98.1 °F (36.7 °C) 77 18 95 %   09/10/20 0735     96 %   09/10/20 0328 156/68 99.2 °F (37.3 °C) 68 17 96 %     Temp (24hrs), Av.9 °F (37.2 °C), Min:97.7 °F (36.5 °C), Max:99.5 °F (37.5 °C)    09/10 0701 - 09/10 1900  In: -   Out: 85 [Drains:35]    Physical Exam:  Constitutional: Well developed, well nourished male in no acute distress, sitting comfortably in the hospital bed. HEENT: Normocephalic and atraumatic. Oropharynx is clear, mucous membranes are moist. Extraocular muscles are intact. Sclerae anicteric. Neck supple without JVD. No thyromegaly present. Lymph node   Deferred. Skin Warm and dry. No bruising and no rash noted. No erythema. No pallor.     Respiratory Lungs are coarse bilaterally (clears with coughing), normal air exchange without accessory muscle use. CVS Normal rate, regular rhythm and normal S1 and S2. No murmurs, gallops, or rubs. Abdomen +NG tube. Soft, nontender and nondistended, normoactive bowel sounds. No palpable mass. No hepatosplenomegaly. Neuro +awake, following commands. Grossly nonfocal with no obvious sensory or motor deficits. MSK Normal range of motion in general.  No edema and no tenderness. Psych Appropriate mood and affect. Labs:    Recent Results (from the past 24 hour(s))   GLUCOSE, POC    Collection Time: 09/09/20 12:38 PM   Result Value Ref Range    Glucose (POC) 122 (H) 65 - 100 mg/dL   GLUCOSE, POC    Collection Time: 09/09/20  4:49 PM   Result Value Ref Range    Glucose (POC) 115 (H) 65 - 100 mg/dL   GLUCOSE, POC    Collection Time: 09/09/20  7:59 PM   Result Value Ref Range    Glucose (POC) 105 (H) 65 - 100 mg/dL   GLUCOSE, POC    Collection Time: 09/09/20  9:18 PM   Result Value Ref Range    Glucose (POC) 109 (H) 65 - 100 mg/dL   CBC WITH AUTOMATED DIFF    Collection Time: 09/10/20  4:01 AM   Result Value Ref Range    WBC 14.8 (H) 4.3 - 11.1 K/uL    RBC 2.77 (L) 4.23 - 5.6 M/uL    HGB 8.1 (L) 13.6 - 17.2 g/dL    HCT 25.6 (L) 41.1 - 50.3 %    MCV 92.4 79.6 - 97.8 FL    MCH 29.2 26.1 - 32.9 PG    MCHC 31.6 31.4 - 35.0 g/dL    RDW 17.2 (H) 11.9 - 14.6 %    PLATELET 123 (L) 878 - 450 K/uL    MPV 11.8 9.4 - 12.3 FL    ABSOLUTE NRBC 0.02 0.0 - 0.2 K/uL    DF AUTOMATED      NEUTROPHILS 78 43 - 78 %    LYMPHOCYTES 4 (L) 13 - 44 %    MONOCYTES 9 4.0 - 12.0 %    EOSINOPHILS 7 0.5 - 7.8 %    BASOPHILS 0 0.0 - 2.0 %    IMMATURE GRANULOCYTES 2 0.0 - 5.0 %    ABS. NEUTROPHILS 11.6 (H) 1.7 - 8.2 K/UL    ABS. LYMPHOCYTES 0.6 0.5 - 4.6 K/UL    ABS. MONOCYTES 1.3 0.1 - 1.3 K/UL    ABS. EOSINOPHILS 1.0 (H) 0.0 - 0.8 K/UL    ABS. BASOPHILS 0.0 0.0 - 0.2 K/UL    ABS. IMM.  GRANS. 0.3 0.0 - 0.5 K/UL   METABOLIC PANEL, COMPREHENSIVE    Collection Time: 09/10/20  4:01 AM   Result Value Ref Range Sodium 146 (H) 136 - 145 mmol/L    Potassium 3.6 3.5 - 5.1 mmol/L    Chloride 113 (H) 98 - 107 mmol/L    CO2 26 21 - 32 mmol/L    Anion gap 7 7 - 16 mmol/L    Glucose 104 (H) 65 - 100 mg/dL    BUN 19 8 - 23 MG/DL    Creatinine 0.90 0.8 - 1.5 MG/DL    GFR est AA >60 >60 ml/min/1.73m2    GFR est non-AA >60 >60 ml/min/1.73m2    Calcium 8.0 (L) 8.3 - 10.4 MG/DL    Bilirubin, total 5.1 (H) 0.2 - 1.1 MG/DL    ALT (SGPT) 25 12 - 65 U/L    AST (SGOT) 26 15 - 37 U/L    Alk.  phosphatase 51 50 - 136 U/L    Protein, total 4.6 (L) 6.3 - 8.2 g/dL    Albumin 1.8 (L) 3.2 - 4.6 g/dL    Globulin 2.8 2.3 - 3.5 g/dL    A-G Ratio 0.6 (L) 1.2 - 3.5     GLUCOSE, POC    Collection Time: 09/10/20  7:19 AM   Result Value Ref Range    Glucose (POC) 102 (H) 65 - 100 mg/dL       Imaging:      ASSESSMENT:  Problem List  Date Reviewed: 9/9/2020          Codes Class Noted    Hypovolemic shock ([facility] ICD-10-CM: R57.1  ICD-9-CM: 785.59  9/8/2020        Respiratory failure, post-operative ([facility] ICD-10-CM: W04.892  ICD-9-CM: 518.51  9/8/2020        Duodenal ulcer with hemorrhage ICD-10-CM: K26.4  ICD-9-CM: 532.40  9/8/2020        Pancreatitis ICD-10-CM: K85.90  ICD-9-CM: 577.0  9/7/2020        Hematemesis ICD-10-CM: K92.0  ICD-9-CM: 578.0  9/2/2020        * (Principal) Acute blood loss anemia ICD-10-CM: D62  ICD-9-CM: 285.1  9/2/2020        Pancreatic cancer ([address] ICD-10-CM: C25.9  ICD-9-CM: 157.9  9/2/2020        Hypotension ICD-10-CM: I95.9  ICD-9-CM: 458.9  9/2/2020        Duodenal mass ICD-10-CM: K31.89  ICD-9-CM: 537.89  9/2/2020        Iron deficiency anemia ICD-10-CM: D50.9  ICD-9-CM: 280.9  9/1/2020        Malignant neoplasm of head of pancreas ([facility] ICD-10-CM: C25.0  ICD-9-CM: 157.0  8/31/2020        Duodenal ulcer ICD-10-CM: K26.9  ICD-9-CM: 532.90  8/12/2020        Dietz's esophagus without dysplasia ICD-10-CM: K22.70  ICD-9-CM: 530.85  8/12/2020        Impaired fasting glucose ICD-10-CM: R73.01  ICD-9-CM: 790.21  5/8/2018        Essential hypertension ICD-10-CM: I10  ICD-9-CM: 401.9  5/8/2018        Mixed hyperlipidemia ICD-10-CM: E78.2  ICD-9-CM: 272.2  5/8/2018        PAC (premature atrial contraction) ICD-10-CM: I49.1  ICD-9-CM: 427.61  5/8/2018        Overweight (BMI 25.0-29. 9) ICD-10-CM: [id number]  ICD-9-CM: Tanna Ferrera  5/8/2018            60 male, recent diagnosis of pancreatic mass with duodenal ulcerated lesion (involving more than 3/4 of circumference) and hepatic metastasis, felt to likely represent metastatic pancreatic cancer, recently hospitalized with hematemesis from 9/2/20-9/6/20. He had required IR directed angiography with embolization of likely pancreatic mass that had eroded into duodenum. He had also received infed during admission. He was felt clinically stable for discharge, however now readmitted w BPR. On evaluation today, pt w rapid reponse being called as had multiple large blood bowel movements as well as single hematemesis, currently hypotensive. He is s/p 3 unit PRBC, w/ plans for FFP. Fibrinogen stable. 9/7 Case d/w hospitalist as well as surgery and rad-onc. Case also discussed w wife at bedside. Given hemorrhagic appearing nature of bleeding w hemodynamic instability, pt will likely need surgical intervention to help stop bleeding, w subsequent ICU monitoring. No current inpatient role for systemic chemotherapy. Aggressive blood transfusion support as needed. Guarded prognosis. We will follow along. 9/8 s/p Exlap 9/7 with resection of distal stomach and proximal duodenum, oversew of bleeding vessel in 2nd portion of duodenum, cholecystectomy, gastrojejunostomy; path pending. Remains intubated, hopeful for extubation today as he awakens and follows commands currently. Hgb stable at 9.7. Plts 92k. Coags yesterday were stable. Continue serial H&H q6 hrs. No signs of bleeding. 9/9 POD#2. Extubated yesterday. Awake and conversive. Remains in ICU and will hopefully be able to move out to the floor today.  No signs of bleeding. Hgb stable at 8.2 this morning. 9/10 POD#3. Moved out of ICU yesterday. Much improved. No reported signs of bleeding and Hgb remains stable at 8.1. Lab studies and imaging studies (CT) were personally reviewed. Pertinent old records were reviewed. Thank you for allowing us to participate in the care of Mr. Nance. Nothing further to add from hem/onc standpoint. He will need to f/u with Dr Alison Alejandra within 1 week of discharge once he has recovered to determine plan for outpatient chemotherapy/treatment going forward. Please do not hesitate to call with questions. AYLIN Silverio. [address] Hematology & Oncology  [address] [address]  Office : [phone]  Fax : [phone]           Attending Addendum:  I have personally performed a face to face diagnostic evaluation on this patient. I have reviewed and agree with the care plan as documented by Siobhan Vance N.P. My findings are as follows:  He has metastatic pancreatic cancer, appears weak, heart rate regular without murmurs, abdomen is non-tender, we will sign off and arrange for him to follow-up in our clinic.               Jose Winn MD      [facility] Hematology/Oncology  [address] [id number] [facility]  Office : [phone]  Fax : [phone]

## 2020-09-10 NOTE — PROGRESS NOTES
END OF SHIFT NOTE:    INTAKE/OUTPUT  09/08 0701 - 09/09 0700  In: 4776.8 [I.V.:4776.8]  Out: 8301 [Urine:2115; Drains:155]  Voiding: YES  Catheter: YES  Drain:   Richard-Garza Drain 09/07/20 Anterior;Right Abdomen (Active)   Site Assessment Clean, dry, & intact 09/09/20 1543   Dressing Status Clean, dry, & intact 09/09/20 1543   Status Patent;Draining 09/09/20 1543   Drainage Color Serous 09/09/20 1543   Output (ml) 25 ml 09/09/20 1543       Richard-Garza Drain 09/07/20 Anterior; Left Abdomen (Active)   Site Assessment Clean, dry, & intact 09/09/20 1543   Dressing Status Old drainage 09/09/20 1543   Status Patent;Draining 09/09/20 1543   Drainage Color Serosanguinous 09/09/20 1543   Output (ml) 30 ml 09/09/20 1543       Nasogastric Tube 09/07/20 (Active)   Site Assessment Clean, dry, & intact 09/09/20 1543   Securement Device Tape 09/09/20 1543   G Port Status Intermittent Suction 09/09/20 1543   External Insertion Jag (cms) 80 cms 09/09/20 0330   Action Taken Placement verified (comment) 09/09/20 0929   Drainage Description Other (Comment) 09/09/20 0929   Water Flush Volume (mL) 50 mL 09/09/20 0929   Drainage Chamber Level (ml) 0 ml 09/09/20 1543   Output (ml) 70 ml 09/09/20 0540               Flatus: Patient does not have flatus present. Stool:  0 occurrences. Characteristics:  Stool Assessment  Stool Color: Red  Stool Appearance: Loose  Stool Amount: Medium  Stool Source/Status: Rectum    Emesis: 0 occurrences.     Characteristics:        VITAL SIGNS  Patient Vitals for the past 12 hrs:   Temp Pulse Resp BP SpO2   09/09/20 1917 99.5 °F (37.5 °C) 81 20 131/85 90 %   09/09/20 1606 97.7 °F (36.5 °C) (!) 59 21 145/73 92 %   09/09/20 1429  80 26 149/67 95 %   09/09/20 1400  83 (!) 33 144/65 91 %   09/09/20 1329  73 28 150/68 94 %   09/09/20 1300  84 (!) 32 151/66 94 %   09/09/20 1229  73 (!) 38 149/67 96 %   09/09/20 1213     93 %   09/09/20 1200 99.5 °F (37.5 °C) 73 27 149/65 94 %   09/09/20 1129  77 28 154/70 94 %   09/09/20 1100  74 17 142/65 95 %   09/09/20 1029  71 27 150/78 97 %   09/09/20 1000  71 30 149/67 98 %   09/09/20 0929  72 8 148/60 98 %   09/09/20 0900  73 27 151/69 98 %   09/09/20 0836     96 %   09/09/20 0829  72 19 144/72 96 %       Pain Assessment  Pain Intensity 1: 0 (09/09/20 1543)  Pain Location 1: Abdomen  Pain Intervention(s) 1: Medication (see MAR)  Patient Stated Pain Goal: 0    Ambulating  No    Shift report given to oncoming nurse at the bedside.     Eliu Pieter

## 2020-09-10 NOTE — PROGRESS NOTES
Hospitalist Note     Admit Date:  2020 12:31 AM   Name:  Gurmeet Knox   Age:  60 y.o.  :  1955   MRN:  [id number]   PCP:  Fiorella Jeffrey MD  Treatment Team: Attending Provider: Ghislaine Marrero MD; Consulting Provider: Mayito De La Paz MD; Consulting Provider: Teofilo Marin MD; Consulting Provider: Edison Wong NP; Consulting Provider: Ghislaine Marrero MD; Utilization Review: Grecia White; Consulting Provider: Edvin Celestin MD; Utilization Review: Madie Hardwick RN; Physical Therapist: Jered Webb PT, DPT; Occupational Therapist: Sarah Douglas OT; Care Manager: [facility] Friday    HPI/Subjective:   Mr. 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 He was admitted from - with acute blood loss anemia 2/2 duodenal tumor bleeding. Transfused 4U RBCs. GI, IR consulted. Had mesenteric arteriogram with coil embolization on 9/3. Port placed and planned to start chemotherapy on . He developed dark red blood in his stools on the night of  (after discharge). He came back to the ER. WBCs 16K, Hb 6.7 (was 8.6 on discharge), positive FOBT. Early on the morning of admission he developed vomiting, diaphoresis and profuse/brisk rectal bleeding. He was given 7 units of emergent release blood, cryo, FFP. Case was discussed with numerous specialties and eventually the patient went emergently to the OR where he had ex-lap with resection of distal stomach, proximal duodenum, oversew of bleeding duodenal vessel (2nd portion), CCY, CBD exploration and gastrojejunosotomy, drain placement x2. Admitted to ICU post-op. Extubated to [place] on . Transferred to floor. 9/10: Wife at bedside. Patient feels well, minimal abdo pain. GILDA drainage is less, still serosanguinous. Bili is up to 5.1 from 2.8 and wife was concerned about yellowing of his eyes.  Had 6 beat run of VT, asymptomatic, HD stable. ROS neg otherwise. No other complaints  Objective:     Patient Vitals for the past 24 hrs:   Temp Pulse Resp BP SpO2   09/10/20 0738 98.1 °F (36.7 °C) 77 18 137/63 95 %   09/10/20 0735     96 %   09/10/20 0328 99.2 °F (37.3 °C) 68 17 156/68 96 %   09/09/20 2246 99.5 °F (37.5 °C) 80 18 137/69 97 %   09/09/20 1917 99.5 °F (37.5 °C) 81 20 131/85 90 %   09/09/20 1606 97.7 °F (36.5 °C) (!) 59 21 145/73 92 %   09/09/20 1429  80 26 149/67 95 %   09/09/20 1400  83 (!) 33 144/65 91 %   09/09/20 1329  73 28 150/68 94 %   09/09/20 1300  84 (!) 32 151/66 94 %   09/09/20 1229  73 (!) 38 149/67 96 %   09/09/20 1213     93 %   09/09/20 1200 99.5 °F (37.5 °C) 73 27 149/65 94 %     Oxygen Therapy  O2 Sat (%): 95 % (09/10/20 0738)  Pulse via Oximetry: 70 beats per minute (09/10/20 0735)  O2 Device: Nasal cannula (09/10/20 0749)  O2 Flow Rate (L/min): 2 l/min (09/10/20 0749)  FIO2 (%): 21 % (09/09/20 1213)    Estimated body mass index is 28.11 kg/m² as calculated from the following:    Height as of this encounter: 5' 7\" (1.702 m). Weight as of this encounter: 81.4 kg (179 lb 8 oz). Intake/Output Summary (Last 24 hours) at 9/10/2020 1136  Last data filed at 9/10/2020 0749  Gross per 24 hour   Intake 2542 ml   Output 1580 ml   Net 962 ml       *Note that automatically entered I/Os may not be accurate; dependent on patient compliance with collection and accurate  by [employer]. General:    Well nourished. Alert. Eyes:  Scleral icterus. CV:   RRR. No murmur, rub, or gallop. Lungs:   CTAB. No wheezing, rhonchi, or rales. Abdomen:   Soft, nondistended. Mild generalized ttp. Incisions bandaged, c/d/i. GILDA drains x2, drainage slowing down, still primarily serosanguinous. NGT left nare. Extremities: Warm and dry. No cyanosis or edema. Skin:     No rashes or jaundice.    Neuro:  No gross focal deficits    Data Reviewed:  I have reviewed all labs, meds, and studies from the last 24 hours:  Recent Results (from the past 24 hour(s))   GLUCOSE, POC    Collection Time: 09/09/20 12:38 PM   Result Value Ref Range    Glucose (POC) 122 (H) 65 - 100 mg/dL   GLUCOSE, POC    Collection Time: 09/09/20  4:49 PM   Result Value Ref Range    Glucose (POC) 115 (H) 65 - 100 mg/dL   GLUCOSE, POC    Collection Time: 09/09/20  7:59 PM   Result Value Ref Range    Glucose (POC) 105 (H) 65 - 100 mg/dL   GLUCOSE, POC    Collection Time: 09/09/20  9:18 PM   Result Value Ref Range    Glucose (POC) 109 (H) 65 - 100 mg/dL   CBC WITH AUTOMATED DIFF    Collection Time: 09/10/20  4:01 AM   Result Value Ref Range    WBC 14.8 (H) 4.3 - 11.1 K/uL    RBC 2.77 (L) 4.23 - 5.6 M/uL    HGB 8.1 (L) 13.6 - 17.2 g/dL    HCT 25.6 (L) 41.1 - 50.3 %    MCV 92.4 79.6 - 97.8 FL    MCH 29.2 26.1 - 32.9 PG    MCHC 31.6 31.4 - 35.0 g/dL    RDW 17.2 (H) 11.9 - 14.6 %    PLATELET 332 (L) 476 - 450 K/uL    MPV 11.8 9.4 - 12.3 FL    ABSOLUTE NRBC 0.02 0.0 - 0.2 K/uL    DF AUTOMATED      NEUTROPHILS 78 43 - 78 %    LYMPHOCYTES 4 (L) 13 - 44 %    MONOCYTES 9 4.0 - 12.0 %    EOSINOPHILS 7 0.5 - 7.8 %    BASOPHILS 0 0.0 - 2.0 %    IMMATURE GRANULOCYTES 2 0.0 - 5.0 %    ABS. NEUTROPHILS 11.6 (H) 1.7 - 8.2 K/UL    ABS. LYMPHOCYTES 0.6 0.5 - 4.6 K/UL    ABS. MONOCYTES 1.3 0.1 - 1.3 K/UL    ABS. EOSINOPHILS 1.0 (H) 0.0 - 0.8 K/UL    ABS. BASOPHILS 0.0 0.0 - 0.2 K/UL    ABS. IMM.  GRANS. 0.3 0.0 - 0.5 K/UL   METABOLIC PANEL, COMPREHENSIVE    Collection Time: 09/10/20  4:01 AM   Result Value Ref Range    Sodium 146 (H) 136 - 145 mmol/L    Potassium 3.6 3.5 - 5.1 mmol/L    Chloride 113 (H) 98 - 107 mmol/L    CO2 26 21 - 32 mmol/L    Anion gap 7 7 - 16 mmol/L    Glucose 104 (H) 65 - 100 mg/dL    BUN 19 8 - 23 MG/DL    Creatinine 0.90 0.8 - 1.5 MG/DL    GFR est AA >60 >60 ml/min/1.73m2    GFR est non-AA >60 >60 ml/min/1.73m2    Calcium 8.0 (L) 8.3 - 10.4 MG/DL    Bilirubin, total 5.1 (H) 0.2 - 1.1 MG/DL    ALT (SGPT) 25 12 - 65 U/L AST (SGOT) 26 15 - 37 U/L    Alk. phosphatase 51 50 - 136 U/L    Protein, total 4.6 (L) 6.3 - 8.2 g/dL    Albumin 1.8 (L) 3.2 - 4.6 g/dL    Globulin 2.8 2.3 - 3.5 g/dL    A-G Ratio 0.6 (L) 1.2 - 3.5     GLUCOSE, POC    Collection Time: 09/10/20  7:19 AM   Result Value Ref Range    Glucose (POC) 102 (H) 65 - 100 mg/dL        Current Meds:  Current Facility-Administered Medications   Medication Dose Route Frequency    TPN ADULT - dextrose 10% amino acid 4.25%   IntraVENous QPM    NUTRITIONAL SUPPORT ELECTROLYTE PRN ORDERS   Does Not Apply PRN    sodium chloride 3% hypertonic nebulizer soln  4 mL Nebulization BID    insulin lispro (HUMALOG) injection 0-10 Units  0-10 Units SubCUTAneous AC&HS    dextrose 40% (GLUTOSE) oral gel 1 Tube  15 g Oral PRN    glucagon (GLUCAGEN) injection 1 mg  1 mg IntraMUSCular PRN    dextrose (D50W) injection syrg 12.5-25 g  25-50 mL IntraVENous PRN    lip protectant (BLISTEX) ointment 1 Each  1 Each Topical PRN    ondansetron (ZOFRAN) injection 4 mg  4 mg IntraVENous Q6H PRN    promethazine (PHENERGAN) with saline injection 12.5 mg  12.5 mg IntraVENous Q6H PRN    pantoprazole (PROTONIX) 40 mg in 0.9% sodium chloride 10 mL injection  40 mg IntraVENous Q12H    octreotide (SANDOSTATIN) 500 mcg in 0.9% sodium chloride 500 mL infusion  50 mcg/hr IntraVENous CONTINUOUS    acetaminophen (TYLENOL) suppository 650 mg  650 mg Rectal Q4H PRN    diphenhydrAMINE (BENADRYL) capsule 25 mg  25 mg Oral Q6H PRN    HYDROmorphone (PF) (DILAUDID) injection 0.5 mg  0.5 mg IntraVENous Q4H PRN    0.9% sodium chloride infusion 250 mL  250 mL IntraVENous PRN    HYDROmorphone (PF) (DILAUDID) injection 1 mg  1 mg IntraVENous Q2H PRN    piperacillin-tazobactam (ZOSYN) 4.5 g in 0.9% sodium chloride (MBP/ADV) 100 mL  4.5 g IntraVENous Q8H    lactated Ringers infusion  125 mL/hr IntraVENous CONTINUOUS       Other Studies:  No results found for this visit on 09/07/20.     Xr Chest Sngl V    Result Date: 9/10/2020  Clinical history: Shortness of breath TECHNIQUE: AP portable chest. COMPARISON: Yesterday. FINDINGS: Left IJ line, right-sided chest port and enteric tube are stable. Bilateral groundglass opacities, likely vascular congestion. Bibasilar atelectasis, similar to prior. There is no pneumothorax. Heart is enlarged. IMPRESSION: 1. Vascular congestion and bibasilar atelectasis. 2. No significant change compared to prior.       All Micro Results     None          SARS-CoV-2 Lab Results  \"Novel Coronavirus\" Test: No results found for: COV2NT   \"Emergent Disease\" Test: No results found for: EDPR  \"SARS-COV-2\" Test: No results found for: XGCOVT  \"Precision Labs\" Test: No results found for: RSLT  Rapid Test:   Lab Results   Component Value Date/Time    COVR Not detected 08/24/2020 03:45 PM            Assessment and Plan:     Hospital Problems as of 9/10/2020 Date Reviewed: 9/9/2020          Codes Class Noted - Resolved POA    Hypovolemic shock ([facility] ICD-10-CM: R57.1  ICD-9-CM: 785.59  9/8/2020 - Present Unknown        Respiratory failure, post-operative ([facility] ICD-10-CM: Q11.625  ICD-9-CM: 518.51  9/8/2020 - Present Unknown        Duodenal ulcer with hemorrhage ICD-10-CM: K26.4  ICD-9-CM: 532.40  9/8/2020 - Present Unknown        Pancreatitis ICD-10-CM: K85.90  ICD-9-CM: 222.3  9/7/2020 - Present Yes        * (Principal) Acute blood loss anemia ICD-10-CM: D62  ICD-9-CM: 285.1  9/2/2020 - Present Unknown        Pancreatic cancer ([facility] ICD-10-CM: C25.9  ICD-9-CM: 157.9  9/2/2020 - Present Yes        Duodenal mass ICD-10-CM: K31.89  ICD-9-CM: 537.89  9/2/2020 - Present Yes        Iron deficiency anemia ICD-10-CM: D50.9  ICD-9-CM: 280.9  9/1/2020 - Present Yes        Malignant neoplasm of head of pancreas ([facility] ICD-10-CM: C25.0  ICD-9-CM: 157.0  8/31/2020 - Present Yes        Dietz's esophagus without dysplasia ICD-10-CM: K22.70  ICD-9-CM: 530.85  8/12/2020 - Present Yes        Essential hypertension ICD-10-CM: I10  ICD-9-CM: 401.9  5/8/2018 - Present Yes        Mixed hyperlipidemia ICD-10-CM: E78.2  ICD-9-CM: 272.2  5/8/2018 - Present Yes              Plan:  # Acute blood loss anemia and hypovolemic shock secondary to bleeding duodenal ulcer              - Shock and bleeding resolved. Off pressors, Hb stable. Diet/NGT per surgery.             - Recent dx of metastatic pancreatic cancer. EGD showed duodenal ulcer. Had mesenteric arteriogram 9/3 with coiling. Re-bled, to OR on 9/7 for ex-lap. Left intubated post-op, extubated 9/8. Transferred to 2nd floor. Con't PPI, abx, stop octreotide soon? # Hyperbilirubinemia   - Up to 5 from 2.8 yesterday. CMP tomorrow.     # Vtach   - 6 beat run, asymptomatic. K 3.6 today. Add on Mg to today's labs. Check electrolytes tomorrow. # JULIAN              - Resolved.     # HTN              - Hold meds.     # Metastatic pancreatic adenocarcinoma    Surgery is now primary, he has no further medical needs. I will sign off. Please call with questions. Thanks.     Signed:  William Daniels MD

## 2020-09-10 NOTE — PROGRESS NOTES
CM viewed patient's chart. Per chart, patient is decreasing in function from baseline and could really benefit from some skilled acute therapy. Consults have been placed for PT & OT to see patient. Evals pending. CM will continue to follow to assist with d/c planning.

## 2020-09-10 NOTE — PROGRESS NOTES
END OF SHIFT NOTE:    INTAKE/OUTPUT  09/09 0701 - 09/10 0700  In: 1853 [I.V.:2542]  Out: 7172 [Urine:1100; Drains:95]  Voiding: NO  Catheter: YES  Drain:   Richard-Garza Drain 09/07/20 Anterior;Right Abdomen (Active)   Site Assessment Clean, dry, & intact 09/09/20 2000   Dressing Status Clean, dry, & intact 09/09/20 2000   Status Patent; Charged;Draining 09/09/20 2000   Drainage Color Serosanguinous 09/09/20 2340   Output (ml) 20 ml 09/09/20 2340       Richard-Garza Drain 09/07/20 Anterior; Left Abdomen (Active)   Site Assessment Clean, dry, & intact 09/09/20 2000   Dressing Status Dry; Intact; Old drainage 09/09/20 2000   Status Draining; Charged; Patent 09/09/20 2000   Drainage Color Serosanguinous 09/09/20 2340   Output (ml) 20 ml 09/09/20 2340       Nasogastric Tube 09/07/20 (Active)   Site Assessment Clean, dry, & intact 09/09/20 2000   Securement Device Tape 09/09/20 2000   G Port Status Intermittent Suction 09/09/20 2000   External Insertion Jag (cms) 78 cms 09/09/20 2000   Action Taken Placement verified (comment) 09/09/20 0929   Drainage Description Coye Goes 09/10/20 0630   Water Flush Volume (mL) 50 mL 09/09/20 0929   Drainage Chamber Level (ml) 300 ml 09/10/20 0630   Output (ml) 300 ml 09/10/20 0630     Flatus: Patient does not have flatus present, per patient    Stool:  0 occurrences. Emesis: 0 occurrences. VITAL SIGNS  Patient Vitals for the past 12 hrs:   Temp Pulse Resp BP SpO2   09/10/20 0328 99.2 °F (37.3 °C) 68 17 156/68 96 %   09/09/20 2246 99.5 °F (37.5 °C) 80 18 137/69 97 %   09/09/20 1917 99.5 °F (37.5 °C) 81 20 131/85 90 %       Pain Assessment  Pain Intensity 1: 7 (09/10/20 0545)  Pain Location 1: Abdomen  Pain Intervention(s) 1: Medication (see MAR)  Patient Stated Pain Goal: 2    Ambulating  No    Shift report to be given to oncoming nurse at the bedside.     [address]

## 2020-09-10 NOTE — PROGRESS NOTES
Problem: Mobility Impaired (Adult and Pediatric)  Goal: *Acute Goals and Plan of Care (Insert Text)  Outcome: Progressing Towards Goal  Note: LTG:  (1.)Mr. Deisy Kaplan will move from supine to sit and sit to supine and roll side to side, using log roll technique, with MODIFIED INDEPENDENCE within 7 treatment day(s). (2.)Mr. Deisy Kaplan will transfer from bed to chair and chair to bed with MODIFIED INDEPENDENCE using the least restrictive device within 7 treatment day(s). (3.)Mr. Deisy Kaplan will ambulate with MODIFIED INDEPENDENCE for 500 feet with the least restrictive device within 7 treatment day(s). (4.)Mr. Deisy Kaplan will participate in therapeutic activity/exercises x 25 minutes for increased strength within 7 treatment days. (5.)Mr. Deisy Kaplan will ascend and descend 2 stairs using 0 hand rail(s) with SUPERVISION to improve functional mobility and safety within 7 treatment day(s). ________________________________________________________________________________________________       PHYSICAL THERAPY: Initial Assessment and AM 9/10/2020  INPATIENT: PT Visit Days : 1  Payor: [facility] / Plan: [address] / Product Type: PPO /       NAME/AGE/GENDER: Gregoria Wolff is a 60 y.o. male   PRIMARY DIAGNOSIS: Acute blood loss anemia [D62]  Duodenal mass [K31.89] Acute blood loss anemia Acute blood loss anemia  Procedure(s) (LRB):  LAPAROTOMY EXPLORATORY /  CHOLECYSTECTOMY (N/A)  3 Days Post-Op  ICD-10: Treatment Diagnosis:    Generalized Muscle Weakness (M62.81)  Difficulty in walking, Not elsewhere classified (R26.2)   Precaution/Allergies:  Tetanus and diphtheria toxoids      ASSESSMENT:     Mr. Deisy Kaplan is a 60 y.o. male in the hospital for the above who was sitting in recliner upon arrival.   Mr. Deisy Kaplan presents to PT with WFL AROM and decreased strength in B LEs. Pt performed STS transfers today with CGA-Kristina and fair standing balance.   Pt ambulated several bouts around room with CGA at decreased gait speed and narrowed AKASH. Pt given cuing for breathing techniques as well as he reported feeling fatigued after walking. Pt' SpO2 recorded at 97% and pt kept on nasal cannula for duration. Pt then performed seated exercises from recliner with cuing for pacing and breathing techniques. Mr. Luis Gibbs could benefit from skilled PT as he is currently functioning below his baseline. This section established at most recent assessment   PROBLEM LIST (Impairments causing functional limitations):  Decreased Strength  Decreased Transfer Abilities  Decreased Ambulation Ability/Technique  Decreased Balance  Increased Pain  Decreased Activity Tolerance   INTERVENTIONS PLANNED: (Benefits and precautions of physical therapy have been discussed with the patient.)  Balance Exercise  Bed Mobility  Family Education  Gait Training  Home Exercise Program (HEP)  Neuromuscular Re-education/Strengthening  Therapeutic Activites  Therapeutic Exercise/Strengthening  Transfer Training     TREATMENT PLAN: Frequency/Duration: 3 times a week for duration of hospital stay  Rehabilitation Potential For Stated Goals: Good     REHAB RECOMMENDATIONS (at time of discharge pending progress):    Placement: It is my opinion, based on this patient's performance to date, that Mr. Luis Gibbs may benefit from [address] after discharge due to the functional deficits listed above that are likely to improve with skilled rehabilitation because he/she has multiple medical issues that affect his/her functional mobility in the community. Equipment:   None at this time              HISTORY:   History of Present Injury/Illness (Reason for Referral):  Blood loss  Past Medical History/Comorbidities:   Mr. Luis Gibbs  has a past medical history of Cancer ([address] GERD (gastroesophageal reflux disease), and Hernia.   Mr. Luis Gibbs  has a past surgical history that includes hx hernia repair (2007); hx orthopaedic (Right, 1982); hx cataract removal (Left, 12/15/2017); ir occl txcath hemmorage w si (9/3/2020); and ir insert tunl cvc w port over 5 years (9/4/2020). Social History/Living Environment:   Home Environment: Private residence  # Steps to Enter: 2  Rails to Enter: No  One/Two Story Residence: Two story, live on 1st floor  Living Alone: No  Support Systems: Spouse/Significant Other/Partner  Patient Expects to be Discharged to[de-identified] Private residence  Current DME Used/Available at Home: None  Tub or Shower Type: Shower  Prior Level of Function/Work/Activity:  Lives with spouse and PTA independent with ambulation. Number of Personal Factors/Comorbidities that affect the Plan of Care: 1-2: MODERATE COMPLEXITY   EXAMINATION:   Most Recent Physical Functioning:   Gross Assessment:  AROM: Within functional limits  Strength: Generally decreased, functional               Posture:     Balance:  Sitting: Impaired  Sitting - Static: Good (unsupported)  Sitting - Dynamic: Fair (occasional)  Standing: Impaired  Standing - Static: Fair  Standing - Dynamic : Fair Bed Mobility:     Wheelchair Mobility:     Transfers:  Sit to Stand: Contact guard assistance;Minimum assistance  Stand to Sit: Contact guard assistance  Gait:     Base of Support: Narrowed  Speed/Margo: Slow;Shuffled  Step Length: Right shortened;Left shortened  Gait Abnormalities: Decreased step clearance; Altered arm swing  Distance (ft): 40 Feet (ft)  Assistive Device: Other (comment)(HHA)  Ambulation - Level of Assistance: Contact guard assistance      Body Structures Involved:  Lungs  Digestive Structures  Metabolic  Muscles Body Functions Affected:  Sensory/Pain  Respiratory  Neuromusculoskeletal  Movement Related  Digestive  Metobolic/Endocrine Activities and Participation Affected:  General Tasks and Demands  Mobility  [address] [place] [address] and [facility]   Number of elements that affect the Plan of Care: 4+: HIGH COMPLEXITY   CLINICAL PRESENTATION:   Presentation: Stable and uncomplicated: LOW COMPLEXITY   CLINICAL DECISION MAKIN[address] [place] [id number] AM-PAC 6 Clicks   Basic Mobility Inpatient Short Form  How much difficulty does the patient currently have. .. Unable A Lot A Little None   1. Turning over in bed (including adjusting bedclothes, sheets and blankets)? [] 1   [] 2   [] 3   [x] 4   2. Sitting down on and standing up from a chair with arms ( e.g., wheelchair, bedside commode, etc.)   [] 1   [] 2   [x] 3   [] 4   3. Moving from lying on back to sitting on the side of the bed? [] 1   [] 2   [x] 3   [] 4   How much help from another person does the patient currently need. .. Total A Lot A Little None   4. Moving to and from a bed to a chair (including a wheelchair)? [] 1   [] 2   [x] 3   [] 4   5. Need to walk in hospital room? [] 1   [] 2   [x] 3   [] 4   6. Climbing 3-5 steps with a railing? [] 1   [] 2   [x] 3   [] 4   © , Trustees of [address] [place] [id number], under license to [employer]. All rights reserved      Score:  Initial: 19 Most Recent: X (Date: -- )    Interpretation of Tool:  Represents activities that are increasingly more difficult (i.e. Bed mobility, Transfers, Gait). Medical Necessity:     Patient demonstrates   good   rehab potential due to higher previous functional level. Reason for Services/Other Comments:  Patient continues to require skilled intervention due to   Decreased balance and activity tolerance  . Use of outcome tool(s) and clinical judgement create a POC that gives a: Clear prediction of patient's progress: LOW COMPLEXITY            TREATMENT:   (In addition to Assessment/Re-Assessment sessions the following treatments were rendered)   Pre-treatment Symptoms/Complaints:  No complaints  Pain: Initial:   Pain Intensity 1: 0  Post Session:  0     Therapeutic Activity: (    23 minutes):   Therapeutic activities including Chair transfers, STS transfers, Ambulation on level ground, and seated exercises to improve mobility, strength, balance, and activity tolerance . Required minimal   to promote static and dynamic balance in standing. Date:  9/10/20 Date:   Date:     ACTIVITY/EXERCISE AM PM AM PM AM PM   Seated LAQ 1 x 20 B        Seated marching 1 x 20 B        Seated AP 1 x 20 B        Seated hip abd/add 1 x 20 R  1 x 18 L                                   B = bilateral; AA = active assistive; A = active; P = passive        Braces/Orthotics/Lines/Etc:   IV  mcintyre catheter  drain (abdominal x 2)   nasogastric tube  O2 Device: Nasal cannula  Treatment/Session Assessment:    Response to Treatment:  Tolerated well but appeared to have some increased anxiety with movement. Interdisciplinary Collaboration:   Physical Therapist  Registered Nurse  After treatment position/precautions:   Up in chair  Bed/Chair-wheels locked  Bed in low position  Call light within reach  RN notified  Family at bedside   Compliance with Program/Exercises: Will assess as treatment progresses  Recommendations/Intent for next treatment session: \"Next visit will focus on advancements to more challenging activities and reduction in assistance provided\".   Total Treatment Duration:  PT Patient Time In/Time Out  Time In: 1000  Time Out: [address] Eduardo Barbour PT, DPT

## 2020-09-10 NOTE — PROGRESS NOTES
9/10/20 per Dr. Bartolo Valdez testing requisition completed and faxed on most recent pathology. Confirmation received.

## 2020-09-10 NOTE — PROGRESS NOTES
Problem: Self Care Deficits Care Plan (Adult)  Goal: *Acute Goals and Plan of Care (Insert Text)  Outcome: Progressing Towards Goal  Note: 1. Pt will toilet with SBA   2. Pt will complete functional mobility for ADLs with SBA  3. Pt will complete lower body dressing with SBA using AE as needed  4. Pt will complete grooming and hygiene at sink with SBA  5. Pt will demonstrate independence with HEP to promote increased BUE strength and functional use for ADLs  6. Pt will tolerate 23 minutes functional activity with min or fewer rest breaks to promote increased endurance for ADLs  7. Pt will complete bed mobility with SBA in prep for ADLs    Timeframe: 7 days       OCCUPATIONAL THERAPY: Initial Assessment and Daily Note 9/10/2020  INPATIENT: OT Visit Days: 1  Payor: BLUE CROSS [facility] / Plan: [address] / Product Type: PPO /      NAME/AGE/GENDER: Marcellus Cardona is a 60 y.o. male   PRIMARY DIAGNOSIS:  Acute blood loss anemia [D62]  Duodenal mass [K31.89] Acute blood loss anemia Acute blood loss anemia  Procedure(s) (LRB):  LAPAROTOMY EXPLORATORY /  CHOLECYSTECTOMY (N/A)  3 Days Post-Op  ICD-10: Treatment Diagnosis:    Generalized Muscle Weakness (M62.81)   Precautions/Allergies:     Tetanus and diphtheria toxoids      ASSESSMENT:     Mr. Nancy Bello presents with anemia and pancreatic cancer with liver mets, s/p exploratory laparotomy and cholecystectomy. 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 This session, pt presented with deficits in endurance, mobility, and strength impacting ADLs. Pt educated on abdominal precautions and on adaptive techniques to maintain during ADLs and mobility for ADLs. Pt required mod A for transfer from supine to sitting via log roll, stood and transferred to the chair with min A using RW. Pt donned/ doffed socks with mod A using cross leg technique.  Pt required extra time for all mobility, activity, and changes in position. Pt is below his functional baseline and would benefit from skilled OT services to address deficits. This section established at most recent assessment   PROBLEM LIST (Impairments causing functional limitations):  Decreased Strength  Decreased ADL/Functional Activities  Decreased Transfer Abilities  Decreased Balance  Decreased Activity Tolerance   INTERVENTIONS PLANNED: (Benefits and precautions of occupational therapy have been discussed with the patient.)  Activities of daily living training  Adaptive equipment training  Balance training  Therapeutic activity  Therapeutic exercise     TREATMENT PLAN: Frequency/Duration: Follow patient 3 times/ week to address above goals. Rehabilitation Potential For Stated Goals: Good     REHAB RECOMMENDATIONS (at time of discharge pending progress):    Placement: It is my opinion, based on this patient's performance to date, that Mr. Keke Conte may benefit from [address] after discharge due to the functional deficits listed above that are likely to improve with skilled rehabilitation because he/she has multiple medical issues that affect his/her functional mobility in the community. Equipment:   3:1 BSC as shower chair              OCCUPATIONAL PROFILE AND HISTORY:   History of Present Injury/Illness (Reason for Referral):  See H&P  Past Medical History/Comorbidities:   Mr. Keke Conte  has a past medical history of Cancer ([facility] [address] GERD (gastroesophageal reflux disease), and Hernia. Mr. Keke Conte  has a past surgical history that includes hx hernia repair (2007); hx orthopaedic (Right, 1982); hx cataract removal (Left, 12/15/2017); ir occl txcath hemmorage w si (9/3/2020); and ir insert tunl cvc w port over 5 years (9/4/2020).   Social History/Living Environment:   Home Environment: Private residence  # Steps to Enter: 2  One/Two Story Residence: One story  Living Alone: No  Support Systems: Spouse/Significant Other/Partner  Patient Expects to be Discharged to[de-identified] Private residence  Current DME Used/Available at Home: Grab bars  Tub or Shower Type: Shower  Prior Level of Function/Work/Activity:  Independent      Number of Personal Factors/Comorbidities that affect the Plan of Care: Expanded review of therapy/medical records (1-2):  MODERATE COMPLEXITY   ASSESSMENT OF OCCUPATIONAL PERFORMANCE[de-identified]   Activities of Daily Living:   Basic ADLs (From Assessment) Complex ADLs (From Assessment)   Feeding: Setup  Oral Facial Hygiene/Grooming: Contact guard assistance  Bathing: Minimum assistance  Upper Body Dressing: Setup  Lower Body Dressing: Minimum assistance  Toileting: Minimum assistance Instrumental ADL  Meal Preparation: Moderate assistance  Homemaking:  Moderate assistance   Grooming/Bathing/Dressing Activities of Daily Living   Grooming  Grooming Assistance: Set-up                         Bed/Mat Mobility  Supine to Sit: Moderate assistance  Sit to Stand: Minimum assistance  Stand to Sit: Minimum assistance  Bed to Chair: Minimum assistance  Scooting: Contact guard assistance     Most Recent Physical Functioning:   Gross Assessment:  AROM: Within functional limits  Strength: Generally decreased, functional               Posture:     Balance:  Sitting: Impaired  Sitting - Static: Good (unsupported)  Sitting - Dynamic: Fair (occasional)  Standing: Impaired  Standing - Static: Fair  Standing - Dynamic : Fair Bed Mobility:  Supine to Sit: Moderate assistance  Scooting: Contact guard assistance  Wheelchair Mobility:     Transfers:  Sit to Stand: Minimum assistance  Stand to Sit: Minimum assistance  Bed to Chair: Minimum assistance            Patient Vitals for the past 6 hrs:   BP SpO2 O2 Flow Rate (L/min) Pulse   09/10/20 0735 -- 96 % 2 l/min --   09/10/20 0738 137/63 95 % -- 77   09/10/20 0749 -- -- 2 l/min --       Mental Status  Neurologic State: Alert  Orientation Level: Oriented X4  Cognition: Appropriate for age attention/concentration                          Physical Skills Involved:  Balance  Strength  Activity Tolerance Cognitive Skills Affected (resulting in the inability to perform in a timely and safe manner):  none Psychosocial Skills Affected:  Habits/Routines  Environmental Adaptation   Number of elements that affect the Plan of Care: 3-5:  MODERATE COMPLEXITY   CLINICAL DECISION MAKIN[address] [place] [address] AM-PAC 6 Clicks   Daily Activity Inpatient Short Form  How much help from another person does the patient currently need. .. Total A Lot A Little None   1. Putting on and taking off regular lower body clothing? [] 1   [] 2   [x] 3   [] 4   2. Bathing (including washing, rinsing, drying)? [] 1   [] 2   [x] 3   [] 4   3. Toileting, which includes using toilet, bedpan or urinal?   [] 1   [] 2   [x] 3   [] 4   4. Putting on and taking off regular upper body clothing? [] 1   [] 2   [] 3   [x] 4   5. Taking care of personal grooming such as brushing teeth? [] 1   [] 2   [] 3   [x] 4   6. Eating meals? [] 1   [] 2   [] 3   [x] 4   © , Trustees of [address] [place] [address], under license to [employer]. All rights reserved      Score:  Initial: 21 Most Recent: X (Date: -- )    Interpretation of Tool:  Represents activities that are increasingly more difficult (i.e. Bed mobility, Transfers, Gait). Medical Necessity:     Patient demonstrates   good   rehab potential due to higher previous functional level. Reason for Services/Other Comments:  Patient   continues to require present interventions due to patient's inability to independently complete ADLs  . Use of outcome tool(s) and clinical judgement create a POC that gives a: MODERATE COMPLEXITY         TREATMENT:   (In addition to Assessment/Re-Assessment sessions the following treatments were rendered)     Pre-treatment Symptoms/Complaints:    Pain: Initial:   Pain Intensity 1: 0  Post Session:  0     Therapeutic Activity: (    10 min):   Therapeutic activities including Bed transfers and Chair transfers to improve mobility, balance, and endurance for ADLs . Braces/Orthotics/Lines/Etc:   IV  mcintyre catheter  drain 2 GILDA   nasogastric tube  O2 Device: Nasal cannula  Treatment/Session Assessment:    Response to Treatment:  no adverse reaction   Interdisciplinary Collaboration:   Occupational Therapist  Registered Nurse  After treatment position/precautions:   Up in chair  Bed/Chair-wheels locked  Call light within reach  RN notified   Compliance with Program/Exercises: Compliant all of the time, Will assess as treatment progresses. Recommendations/Intent for next treatment session: \"Next visit will focus on advancements to more challenging activities and reduction in assistance provided\".   Total Treatment Duration:  OT Patient Time In/Time Out  Time In: 0931  Time Out: [address] Prakash Bowie OT

## 2020-09-10 NOTE — PROGRESS NOTES
PLAN:  NGT to LIWS  Diet NPO  IVFs  Zosyn  SCD/IS/Protonix  for prophylactic measures  Ok for OOB to chair  Pain/nausea control  Monitor labs  Monitor drain output  Drain care  Ok to leave incision GELACIO  Leave mcintyre for now   TPN    ASSESSMENT:  Admit Date: 9/7/2020   3 Days Post-Op  Procedure(s):  LAPAROTOMY EXPLORATORY /  CHOLECYSTECTOMY    Principal Problem:    Acute blood loss anemia (9/2/2020)    Active Problems:    Essential hypertension (5/8/2018)      Mixed hyperlipidemia (5/8/2018)      Dietz's esophagus without dysplasia (8/12/2020)      Malignant neoplasm of head of pancreas ([address] (8/31/2020)      Iron deficiency anemia (9/1/2020)      Pancreatic cancer ([address] (9/2/2020)      Duodenal mass (9/2/2020)      Pancreatitis (9/7/2020)      Hypovolemic shock ([address] (9/8/2020)      Respiratory failure, post-operative ([facility] [address] (9/8/2020)      Duodenal ulcer with hemorrhage (9/8/2020)         SUBJECTIVE:  9/8/2020  POD#1 AF, soft BP, 30% vent-- plans to extubate today. Mcintyre -380mL UOP/24h. NGT with 100mL out. Left GILDA with 90mL serosanguinous otuput;  right GILDA with 170mL bilious output (drains around duodenal stump). WBC 14, H/H 9.7/28. Cr 1.87. BS 200s. On Fentanyl and Octreotide gtts. LFTs pending today. 9/9/2020  POD#2  Extubated yesterday. Alert in bed w/o complaints. Oxygenating well on 2L NC; congested. NGT with 750mL out; dark/bilious. Left GILDA with 45mL serosanguinous otuput;  right GILDA with 110mL serous/bile tinged output (drains around duodenal stump). WBC 13.9, H/H 8.2/25. Cr 1.1. Tbili 2.8.     9/9/2020  POD#3  Transferred to floor yesterday. Alert in bed w/o complaints. Pain controlled. Oxygenating well on 2L NC; congested. NGT with 750mL out; dark/bilious. Left GILDA with 50mL serosanguinous otuput;  right GILDA with 45mL serous output. WBC 14.8, H/H 8.1/25. 6. Tbili up to 5.1. Denies flatus.      Intake/Output Summary (Last 24 hours) at 9/10/2020 0924  Last data filed at 9/10/2020 0781  Gross per 24 hour   Intake 2542 ml   Output 1580 ml   Net 962 ml     OBJECTIVE:  Constitutional: Alert oriented cooperative patient in no acute distress; appears stated age   Visit Vitals  /63   Pulse 77   Temp 98.1 °F (36.7 °C)   Resp 18   Ht 5' 7\" (1.702 m)   Wt 179 lb 8 oz (81.4 kg)   SpO2 95%   BMI 28.11 kg/m²     Eyes:Sclera are clear. ENMT: no external lesions gross hearing normal; no obvious neck masses, no ear or lip lesions, +NGT  CV: RRR. Normal perfusion  Resp: No JVD. Breathing is  non-labored; no audible wheezing. GI: soft and non-distended, dressing removed, incision c/d/i with staples, GILDA bilat  : mcintyre intact; urine clear  Musculoskeletal: unremarkable with normal function. No embolic signs or cyanosis.    Neuro:  Oriented; moves all 4; no focal deficits  Psychiatric: normal affect and mood, no memory impairment      Patient Vitals for the past 24 hrs:   BP Temp Pulse Resp SpO2   09/10/20 0738 137/63 98.1 °F (36.7 °C) 77 18 95 %   09/10/20 0735     96 %   09/10/20 0328 156/68 99.2 °F (37.3 °C) 68 17 96 %   09/09/20 2246 137/69 99.5 °F (37.5 °C) 80 18 97 %   09/09/20 1917 131/85 99.5 °F (37.5 °C) 81 20 90 %   09/09/20 1606 145/73 97.7 °F (36.5 °C) (!) 59 21 92 %   09/09/20 1429 149/67  80 26 95 %   09/09/20 1400 144/65  83 (!) 33 91 %   09/09/20 1329 150/68  73 28 94 %   09/09/20 1300 151/66  84 (!) 32 94 %   09/09/20 1229 149/67  73 (!) 38 96 %   09/09/20 1213     93 %   09/09/20 1200 149/65 99.5 °F (37.5 °C) 73 27 94 %   09/09/20 1129 154/70  77 28 94 %   09/09/20 1100 142/65  74 17 95 %   09/09/20 1029 150/78  71 27 97 %   09/09/20 1000 149/67  71 30 98 %     Labs:    Recent Labs     09/10/20  0401  09/07/20  1600   WBC 14.8*   < > 11.4*   HGB 8.1*   < > 9.6*   *   < > 92*   *   < > 146*   K 3.6   < > 4.4   *   < > 117*   CO2 26   < > 24   BUN 19   < > 18   CREA 0.90   < > 1.06   *   < > 197*   PTP  --   --  17.7*   INR  --   --  1.4   APTT  --   --  32.9 TBILI 5.1*   < >  --    ALT 25   < >  --    AP 51   < >  --     < > = values in this interval not displayed.        Signed:  Ayden Mack NP

## 2020-09-10 NOTE — PROGRESS NOTES
[facility] CRITICAL CARE OUTREACH NURSE PROGRESS REPORT      SUBJECTIVE: Called to assess patient secondary to transfer from critical care. MEWS Score: 2 (09/09/20 1200)  Vitals:    09/09/20 1400 09/09/20 1429 09/09/20 1606 09/09/20 1917   BP: 144/65 149/67 145/73 131/85   Pulse: 83 80 (!) 59 81   Resp: (!) 33 26 21 20   Temp:   97.7 °F (36.5 °C) 99.5 °F (37.5 °C)   SpO2: 91% 95% 92% 90%   Weight:       Height:           LAB DATA:    Recent Labs     09/09/20  0343 09/08/20  0344 09/07/20 2002 09/07/20  0046   * 147* 146*   < > 140   K 3.9 4.3 4.4   < > 3.9   * 117* 119*   < > 110*   CO2 25 21 22   < > 23   AGAP 8 9 5*   < > 7   * 153* 168*   < > 170*   BUN 20 23 19   < > 16   CREA 1.13 1.87* 1.19   < > 0.92   GFRAA >60 47* >60   < > >60   GFRNA >60 39* >60   < > >60   CA 7.3* 7.2* 7.4*   < > 7.7*   ALB 1.9*  --  1.9*  --  1.8*   TP 4.3*  --  4.1*  --  4.6*   GLOB 2.4  --  2.2*  --  2.8   AGRAT 0.8*  --  0.9*  --  0.6*   ALT 19  --  27  --  20    < > = values in this interval not displayed. Recent Labs     09/09/20  0343 09/08/20  1949 09/08/20  1002 09/08/20  0146 09/07/20 2002   WBC 13.9*  --   --  14.9* 17.0*   HGB 8.2* 8.3* 8.9* 9.7* 12.5*   HCT 25.0* 24.9* 26.9* 28.9* 36.4*   PLT 99*  --   --  92* 107*          OBJECTIVE: On arrival to room, I found patient to be resting quietly in bed. ASSESSMENT:  Pt alert and oriented, denies any SOB. C/O slight abd incision discomfort. Abd incision with staples, open to air. R and L GILDA drains patent, charged, and draining serosanguinous drainage. NGT to LIS. VS, labs, and progress notes reviewed. Denies any signs of bleeding. Hgb stable at 8.2 this AM. SpO2 96% on 2L NC. Lung sounds clear bilaterally. Primary RN without any additional needs/concerns at this time. PLAN:  Will continue to follow per outreach protocol.

## 2020-09-11 ENCOUNTER — APPOINTMENT (OUTPATIENT)
Dept: GENERAL RADIOLOGY | Age: 65
DRG: 326 | End: 2020-09-11
Attending: NURSE PRACTITIONER
Payer: COMMERCIAL

## 2020-09-11 ENCOUNTER — APPOINTMENT (OUTPATIENT)
Dept: GENERAL RADIOLOGY | Age: 65
DRG: 326 | End: 2020-09-11
Attending: INTERNAL MEDICINE
Payer: COMMERCIAL

## 2020-09-11 LAB
ABO + RH BLD: NORMAL
ALBUMIN SERPL-MCNC: 1.7 G/DL (ref 3.2–4.6)
ALBUMIN/GLOB SERPL: 0.6 {RATIO} (ref 1.2–3.5)
ALP SERPL-CCNC: 62 U/L (ref 50–136)
ALT SERPL-CCNC: 38 U/L (ref 12–65)
ANION GAP SERPL CALC-SCNC: 5 MMOL/L (ref 7–16)
AST SERPL-CCNC: 28 U/L (ref 15–37)
BILIRUB SERPL-MCNC: 4.5 MG/DL (ref 0.2–1.1)
BLD PROD TYP BPU: NORMAL
BLOOD BANK CMNT PATIENT-IMP: NORMAL
BLOOD GROUP ANTIBODIES SERPL: NORMAL
BPU ID: NORMAL
BUN SERPL-MCNC: 17 MG/DL (ref 8–23)
CALCIUM SERPL-MCNC: 7.4 MG/DL (ref 8.3–10.4)
CHLORIDE SERPL-SCNC: 110 MMOL/L (ref 98–107)
CO2 SERPL-SCNC: 28 MMOL/L (ref 21–32)
CREAT SERPL-MCNC: 0.85 MG/DL (ref 0.8–1.5)
CROSSMATCH RESULT,%XM: NORMAL
ERYTHROCYTE [DISTWIDTH] IN BLOOD BY AUTOMATED COUNT: 16.7 % (ref 11.9–14.6)
GLOBULIN SER CALC-MCNC: 2.8 G/DL (ref 2.3–3.5)
GLUCOSE BLD STRIP.AUTO-MCNC: 140 MG/DL (ref 65–100)
GLUCOSE BLD STRIP.AUTO-MCNC: 174 MG/DL (ref 65–100)
GLUCOSE BLD STRIP.AUTO-MCNC: 179 MG/DL (ref 65–100)
GLUCOSE BLD STRIP.AUTO-MCNC: 184 MG/DL (ref 65–100)
GLUCOSE SERPL-MCNC: 165 MG/DL (ref 65–100)
HCT VFR BLD AUTO: 25.6 % (ref 41.1–50.3)
HGB BLD-MCNC: 8.1 G/DL (ref 13.6–17.2)
HGB BLD-MCNC: 8.6 G/DL (ref 13.6–17.2)
MAGNESIUM SERPL-MCNC: 2.3 MG/DL (ref 1.8–2.4)
MCH RBC QN AUTO: 29.3 PG (ref 26.1–32.9)
MCHC RBC AUTO-ENTMCNC: 31.6 G/DL (ref 31.4–35)
MCV RBC AUTO: 92.8 FL (ref 79.6–97.8)
NRBC # BLD: 0.02 K/UL (ref 0–0.2)
PHOSPHATE SERPL-MCNC: 1.5 MG/DL (ref 2.3–3.7)
PLATELET # BLD AUTO: 191 K/UL (ref 150–450)
PMV BLD AUTO: 11.2 FL (ref 9.4–12.3)
POTASSIUM SERPL-SCNC: 3.3 MMOL/L (ref 3.5–5.1)
PROT SERPL-MCNC: 4.5 G/DL (ref 6.3–8.2)
RBC # BLD AUTO: 2.76 M/UL (ref 4.23–5.6)
SODIUM SERPL-SCNC: 143 MMOL/L (ref 136–145)
SPECIMEN EXP DATE BLD: NORMAL
STATUS OF UNIT,%ST: NORMAL
TRIGL SERPL-MCNC: 193 MG/DL (ref 35–150)
UNIT DIVISION, %UDIV: 0
WBC # BLD AUTO: 14.3 K/UL (ref 4.3–11.1)

## 2020-09-11 PROCEDURE — 74011250636 HC RX REV CODE- 250/636: Performed by: INTERNAL MEDICINE

## 2020-09-11 PROCEDURE — 74011250636 HC RX REV CODE- 250/636: Performed by: SURGERY

## 2020-09-11 PROCEDURE — 65660000000 HC RM CCU STEPDOWN

## 2020-09-11 PROCEDURE — 94760 N-INVAS EAR/PLS OXIMETRY 1: CPT

## 2020-09-11 PROCEDURE — 83735 ASSAY OF MAGNESIUM: CPT

## 2020-09-11 PROCEDURE — 85027 COMPLETE CBC AUTOMATED: CPT

## 2020-09-11 PROCEDURE — 77010033678 HC OXYGEN DAILY

## 2020-09-11 PROCEDURE — 80053 COMPREHEN METABOLIC PANEL: CPT

## 2020-09-11 PROCEDURE — 74240 X-RAY XM UPR GI TRC 1CNTRST: CPT

## 2020-09-11 PROCEDURE — 74011636320 HC RX REV CODE- 636/320: Performed by: SURGERY

## 2020-09-11 PROCEDURE — 82962 GLUCOSE BLOOD TEST: CPT

## 2020-09-11 PROCEDURE — 84478 ASSAY OF TRIGLYCERIDES: CPT

## 2020-09-11 PROCEDURE — 74011000250 HC RX REV CODE- 250: Performed by: NURSE PRACTITIONER

## 2020-09-11 PROCEDURE — 74011000250 HC RX REV CODE- 250: Performed by: INTERNAL MEDICINE

## 2020-09-11 PROCEDURE — 74011000250 HC RX REV CODE- 250: Performed by: SURGERY

## 2020-09-11 PROCEDURE — 94640 AIRWAY INHALATION TREATMENT: CPT

## 2020-09-11 PROCEDURE — 71045 X-RAY EXAM CHEST 1 VIEW: CPT

## 2020-09-11 PROCEDURE — 74011000258 HC RX REV CODE- 258: Performed by: SURGERY

## 2020-09-11 PROCEDURE — C9113 INJ PANTOPRAZOLE SODIUM, VIA: HCPCS | Performed by: INTERNAL MEDICINE

## 2020-09-11 PROCEDURE — 74011636637 HC RX REV CODE- 636/637: Performed by: PHYSICIAN ASSISTANT

## 2020-09-11 PROCEDURE — C1751 CATH, INF, PER/CENT/MIDLINE: HCPCS

## 2020-09-11 PROCEDURE — 84100 ASSAY OF PHOSPHORUS: CPT

## 2020-09-11 PROCEDURE — 85018 HEMOGLOBIN: CPT

## 2020-09-11 PROCEDURE — 36415 COLL VENOUS BLD VENIPUNCTURE: CPT

## 2020-09-11 RX ADMIN — DIATRIZOATE MEGLUMINE AND DIATRIZOATE SODIUM 90 ML: 660; 100 LIQUID ORAL; RECTAL at 12:57

## 2020-09-11 RX ADMIN — SODIUM CHLORIDE SOLN NEBU 3% 4 ML: 3 NEBU SOLN at 21:33

## 2020-09-11 RX ADMIN — ALBUTEROL SULFATE 2.5 MG: 2.5 SOLUTION RESPIRATORY (INHALATION) at 21:33

## 2020-09-11 RX ADMIN — I.V. FAT EMULSION 250 ML: 20 EMULSION INTRAVENOUS at 17:51

## 2020-09-11 RX ADMIN — PIPERACILLIN AND TAZOBACTAM 4.5 G: 4; .5 INJECTION, POWDER, FOR SOLUTION INTRAVENOUS at 13:23

## 2020-09-11 RX ADMIN — HYDROMORPHONE HYDROCHLORIDE 1 MG: 1 INJECTION, SOLUTION INTRAMUSCULAR; INTRAVENOUS; SUBCUTANEOUS at 04:01

## 2020-09-11 RX ADMIN — PIPERACILLIN AND TAZOBACTAM 4.5 G: 4; .5 INJECTION, POWDER, FOR SOLUTION INTRAVENOUS at 21:37

## 2020-09-11 RX ADMIN — MAGNESIUM SULFATE HEPTAHYDRATE: 500 INJECTION, SOLUTION INTRAMUSCULAR; INTRAVENOUS at 17:48

## 2020-09-11 RX ADMIN — OCTREOTIDE ACETATE 50 MCG/HR: 500 INJECTION, SOLUTION INTRAVENOUS; SUBCUTANEOUS at 18:24

## 2020-09-11 RX ADMIN — POTASSIUM PHOSPHATE, MONOBASIC AND POTASSIUM PHOSPHATE, DIBASIC: 224; 236 INJECTION, SOLUTION, CONCENTRATE INTRAVENOUS at 13:23

## 2020-09-11 RX ADMIN — PIPERACILLIN AND TAZOBACTAM 4.5 G: 4; .5 INJECTION, POWDER, FOR SOLUTION INTRAVENOUS at 05:40

## 2020-09-11 RX ADMIN — INSULIN LISPRO 2 UNITS: 100 INJECTION, SOLUTION INTRAVENOUS; SUBCUTANEOUS at 13:44

## 2020-09-11 RX ADMIN — ALBUTEROL SULFATE 2.5 MG: 2.5 SOLUTION RESPIRATORY (INHALATION) at 07:45

## 2020-09-11 RX ADMIN — INSULIN LISPRO 2 UNITS: 100 INJECTION, SOLUTION INTRAVENOUS; SUBCUTANEOUS at 21:37

## 2020-09-11 RX ADMIN — SODIUM CHLORIDE 40 MG: 9 INJECTION INTRAMUSCULAR; INTRAVENOUS; SUBCUTANEOUS at 21:38

## 2020-09-11 RX ADMIN — SODIUM CHLORIDE 40 MG: 9 INJECTION INTRAMUSCULAR; INTRAVENOUS; SUBCUTANEOUS at 08:51

## 2020-09-11 RX ADMIN — HYDROMORPHONE HYDROCHLORIDE 1 MG: 1 INJECTION, SOLUTION INTRAMUSCULAR; INTRAVENOUS; SUBCUTANEOUS at 17:51

## 2020-09-11 NOTE — PROGRESS NOTES
END OF SHIFT NOTE:    INTAKE/OUTPUT  09/10 0701 - 09/11 0700  In: 4235.5 [P.O.:360; I.V.:3875.5]  Out: 8266 [IDGHT:5160; Drains:90]  Voiding: NO  Catheter: YES  Drain:   Richard-Garza Drain 09/07/20 Anterior;Right Abdomen (Active)   Site Assessment Clean, dry, & intact 09/10/20 2015   Dressing Status Intact 09/10/20 2015   Status Patent;Draining; Charged 09/10/20 2015   Drainage Color Serosanguinous 09/11/20 0030   Output (ml) 10 ml 09/11/20 0030       Richard-Garza Drain 09/07/20 Anterior; Left Abdomen (Active)   Site Assessment Clean, dry, & intact 09/10/20 2015   Dressing Status Intact; Old drainage 09/10/20 2015   Status Patent; Charged;Draining 09/10/20 2015   Drainage Color Serosanguinous 09/11/20 0030   Output (ml) 20 ml 09/11/20 0030       Nasogastric Tube 09/07/20 (Active)   Site Assessment Clean, dry, & intact 09/10/20 2015   Securement Device Tape 09/10/20 2015   G Port Status Intermittent Suction 09/10/20 2015   External Insertion Jag (cms) 78 cms 09/10/20 2015   Action Taken Other (comment); Tubing changed 09/11/20 0602   Drainage Description Green 09/11/20 0602   Water Flush Volume (mL) 50 mL 09/09/20 0929   Drainage Chamber Level (ml) 900 ml 09/11/20 0602   Output (ml) 400 ml 09/11/20 0602     Flatus: Patient does not have flatus present, per patient    Stool:  0 occurrences. Emesis: 0 occurrences. VITAL SIGNS  Patient Vitals for the past 12 hrs:   Temp Pulse Resp BP SpO2   09/11/20 0308 98.7 °F (37.1 °C) 85 17 132/84 94 %   09/10/20 2234 98.1 °F (36.7 °C) 84 16 125/68 96 %   09/10/20 2017 98.3 °F (36.8 °C) 70 17 138/78 92 %   09/10/20 1920     99 %     Pain Assessment  Pain Intensity 1: 6 (09/11/20 0401)  Pain Location 1: Abdomen  Pain Intervention(s) 1: Medication (see MAR)  Patient Stated Pain Goal: 2    Ambulating  No    Shift report to be given to oncoming nurse at the bedside.     [address]

## 2020-09-11 NOTE — PROGRESS NOTES
Consuela Lombard, NP notified of pt having 2 bloody, medium sized bowel movements and excessive drainage around right GILDA site. No new orders received.

## 2020-09-11 NOTE — PROGRESS NOTES
Date of Outreach Update:  Sahra Thompson was seen and assessed. Pt with no complaints at this time. Vitals stable. No concerns from primary RN. MEWS Score: 1 (09/10/20 2234)  Vitals:    09/10/20 1507 09/10/20 1920 09/10/20 2017 09/10/20 2234   BP: 155/63  138/78 125/68   Pulse: (!) 59  70 84   Resp: 18  17 16   Temp: 98.4 °F (36.9 °C)  98.3 °F (36.8 °C) 98.1 °F (36.7 °C)   SpO2: 99% 99% 92% 96%   Weight:       Height:             Pain Assessment  Pain Intensity 1: 7 (09/10/20 2054)  Pain Location 1: Abdomen  Pain Intervention(s) 1: Medication (see MAR)  Patient Stated Pain Goal: 2      Previous Outreach assessment has been reviewed. There have been no significant clinical changes since the completion of the last dated Outreach assessment. Will continue to follow up per outreach protocol.     Signed By:   Jeramy Valentin RN    September 11, 2020 12:26 AM

## 2020-09-11 NOTE — PROGRESS NOTES
PT and OT recommending [facility] for patient. CM provided [facility] list to patient and spouse. CM will check in at another date to get choice. Spouse would like to look over the list.       CM will continue to follow.

## 2020-09-11 NOTE — PROGRESS NOTES
Comprehensive Nutrition Assessment    Type and Reason for Visit: Reassess(TPN management (General Surgery))    Nutrition Recommendations/Plan:   1) Change to 1.8 L 15%DEX/5%AA with 250 ml 20% lipids. Regimen to provide 1778 kcal (100% needs), 90 g pro (100% needs), 270 g CHO (does not exceed max CHO), ~2L total volume. Additives per L: 40 meq NaAcetate, 30 meq KPhos, 5 meq Mg  Addtives per day: MVI/MTE  2) Nutrition support labs: continue daily BMP, repeat Phos and triglycerides in am.  3) Will replaced K and Phos per protocol. Nutrition Assessment:  Patient with PMH of GERD and recent dx of pancreatic cancer metastatic to liver. He is s/p EUS 8/25. Admitted from 9/2-9/6 due to acute blood loss s/p mesnteric arteriogram and emboization 9/3. He presented with dark red blood in yesi. Rapid called due to high volume blood from rectum. He is now s/p emergent exp lap with resection of distal stomach and proximal duedenum, cholecystectomy, and gastrojejunostomy 9/7. Patient seen in follow up today. He reports no issues with TPN. States he is passing gas and had a very small BM this am.  RN states that no SSI coverage has been given as patient has been NPO. Explained that TPN now providing CHO and will likely need to cover per SSI protocol. Also explained TPN tonight will increase in CHO.   Medications: SSI, Protonix, Zosyn  Abdomen: hypoactive BS, last BM 9/11 -watery  HEARN: 600 ml  Labs significant for: Na 143 (improved), K+ 3.3, Phos 1.5, Mg 2.3, AM glucose 165, POC glucoses 102-179 over last 24 hrs    Estimated Daily Nutrient Needs:  Energy (kcal):  0467-4577(21-09 kcal/kg (recent body weight 70.9 kg))  Protein (g):  (1.2-1.5 g/kg (recent body weight 70.9 kg))   Max CHO: 387 g (4mg/kgIBW/min/d)       Fluid (ml/day):  7946-7545(~1 ml/kcal)      Current Nutrition Therapies:   DIET NPO With Ice Chips  Current Parenteral Nutrition Orders:  · Type and Formula: 10%DEX/4.25%AA   · Lipids: None  · Duration: Continuous  · Rate/Volume: 2L, 86 ml/hr  · Current PN Order Provides: 1020 kcal (~58% needs), 85 g pro (100% needs), 200 g CHO (does not exceed max CHO), 2L fluid (100% needs). · Goal PN Orders Provides: 1778 kcal (100% needs), 90 g pro (100% needs), 270 CHO (does not exceed max CHO), ~2L total volume      Anthropometric Measures:  · Height:  5' 7\" (170.2 cm)  · Current Body Wt:  80.1 kg (176 lb 9.4 oz)(bed scale)   · Usual Body Wt:  165 lb     · Ideal Body Wt:  148 lbs:  121.3 %   · Body mass index is 27.66 kg/m². · BMI Category: Overweight (BMI 25.0-29. 9)       Nutrition Diagnosis:   · Inadequate oral intake related to altered GI function as evidenced by (s/p surgery as above, NPO, PN for primary needs)    Nutrition Interventions:   Food and/or Nutrient Delivery: Continue NPO, Modify parenteral nutrition  Coordination of Nutrition Care: (Discussed with Juliane Paz RN)    Goals:  Meet at least 75% nutrition needs witiin 7 days       Nutrition Monitoring and Evaluation:   Food/Nutrient Intake Outcomes: Parenteral nutrition intake/tolerance  Physical Signs/Symptoms Outcomes: Biochemical data, GI status    Discharge Planning:     Too soon to determine     [address] [place], LD on 9/11/2020 at 10:25 AM  Contact: [phone]

## 2020-09-11 NOTE — PROGRESS NOTES
[facility] [address] CRITICAL CARE OUTREACH NURSE PROGRESS REPORT      SUBJECTIVE: Assessed patient secondary to outreach protocol. MEWS Score: 1 (09/11/20 0308)  Vitals:    09/11/20 0308 09/11/20 0428 09/11/20 0748 09/11/20 0800   BP: 132/84      Pulse: 85   85   Resp: 17      Temp: 98.7 °F (37.1 °C)      SpO2: 94%  97%    Weight:  80.1 kg (176 lb 9.4 oz)     Height:              LAB DATA:    Recent Labs     09/11/20  0359 09/10/20  0401 09/09/20  0343    146* 147*   K 3.3* 3.6 3.9   * 113* 114*   CO2 28 26 25   AGAP 5* 7 8   * 104* 125*   BUN 17 19 20   CREA 0.85 0.90 1.13   GFRAA >60 >60 >60   GFRNA >60 >60 >60   CA 7.4* 8.0* 7.3*   MG 2.3 2.2  --    PHOS 1.5*  --   --    ALB 1.7* 1.8* 1.9*   TP 4.5* 4.6* 4.3*   GLOB 2.8 2.8 2.4   AGRAT 0.6* 0.6* 0.8*   ALT 38 25 19        Recent Labs     09/11/20  0359 09/10/20  0401 09/09/20  0343   WBC 14.3* 14.8* 13.9*   HGB 8.1* 8.1* 8.2*   HCT 25.6* 25.6* 25.0*    148* 99*          OBJECTIVE: On arrival to room, I found patient to be resting in bed. Pain Assessment  Pain Intensity 1: 6 (09/11/20 0401)  Pain Location 1: Abdomen  Pain Intervention(s) 1: Medication (see MAR)  Patient Stated Pain Goal: 2                                 ASSESSMENT:  Pt in bed, AXO, on 2L NC, sats acceptable. Primary RN at bedside. Pt hgb stable, last BM red this AM. GILDA's intact. VSS, no immediate concerns at this time. PLAN:        Will continue to follow up per outreach protocol.     Signed By:   Levi Castro RN    September 11, 2020 9:03 AM

## 2020-09-11 NOTE — PROGRESS NOTES
END OF SHIFT NOTE:    INTAKE/OUTPUT  09/09 0701 - 09/10 0700  In: 2102 [I.V.:2542]  Out: 1048 [Urine:1100; Drains:95]  Voiding: YES  Catheter: NO  Drain:   Richard-Garza Drain 09/07/20 Anterior;Right Abdomen (Active)   Site Assessment Clean, dry, & intact 09/10/20 1612   Dressing Status Clean, dry, & intact 09/10/20 1612   Status Patent;Draining 09/10/20 1612   Drainage Color Serous 09/10/20 1612   Output (ml) 15 ml 09/10/20 0749       Richard-Garza Drain 09/07/20 Anterior; Left Abdomen (Active)   Site Assessment Clean, dry, & intact 09/10/20 1612   Dressing Status Old drainage 09/10/20 1612   Status Patent;Draining 09/10/20 1612   Drainage Color Serous 09/10/20 1612   Output (ml) 15 ml 09/10/20 1612       Nasogastric Tube 09/07/20 (Active)   Site Assessment Clean, dry, & intact 09/10/20 1612   Securement Device Tape 09/10/20 1612   G Port Status Intermittent Suction 09/10/20 1612   External Insertion Jag (cms) 78 cms 09/09/20 2000   Action Taken Placement verified (comment) 09/09/20 0929   Drainage Description Green 09/10/20 1612   Water Flush Volume (mL) 50 mL 09/09/20 0929   Drainage Chamber Level (ml) 500 ml 09/10/20 1612   Output (ml) 150 ml 09/10/20 1612               Flatus: Patient does not have flatus present. Stool:  0 occurrences. Characteristics:  Stool Assessment  Stool Color: Red  Stool Appearance: Loose  Stool Amount: Medium  Stool Source/Status: Rectum    Emesis: 0 occurrences. Characteristics:        VITAL SIGNS  Patient Vitals for the past 12 hrs:   Temp Pulse Resp BP SpO2   09/10/20 1920     99 %   09/10/20 1507 98.4 °F (36.9 °C) (!) 59 18 155/63 99 %   09/10/20 1135 98.3 °F (36.8 °C) 66 18 128/61 98 %   09/10/20 1000     97 %       Pain Assessment  Pain Intensity 1: 5 (09/10/20 1612)  Pain Location 1: Abdomen  Pain Intervention(s) 1: Refused, Rest  Patient Stated Pain Goal: 2    Ambulating  Yes    Shift report given to oncoming nurse at the bedside.     [facility]

## 2020-09-11 NOTE — PROGRESS NOTES
END OF SHIFT NOTE:    INTAKE/OUTPUT  09/10 0701 - 09/11 0700  In: 4235.5 [P.O.:360; I.V.:3875.5]  Out: 1733 [Urine:1650; Drains:90]  Voiding: NO  Catheter: YES  Drain:   Richard-Garza Drain 09/07/20 Anterior;Right Abdomen (Active)   Site Assessment Clean, dry, & intact 09/11/20 1430   Dressing Status Clean, dry, & intact 09/11/20 1430   Status Patent;Draining; Charged 09/11/20 1430   Drainage Color Serosanguinous 09/11/20 1430   Output (ml) 20 ml 09/11/20 0858       Richard-Garza Drain 09/07/20 Anterior; Left Abdomen (Active)   Site Assessment Clean, dry, & intact 09/11/20 1430   Dressing Status Clean, dry, & intact 09/11/20 1430   Status Patent; Charged;Draining 09/11/20 1430   Drainage Color Serosanguinous 09/11/20 1430   Output (ml) 30 ml 09/11/20 0858       Nasogastric Tube 09/07/20 (Active)   Site Assessment Clean, dry, & intact 09/11/20 1430   Securement Device Tape 09/11/20 1430   G Port Status Intermittent Suction 09/11/20 1430   External Insertion Jag (cms) 78 cms 09/11/20 1430   Action Taken Other (comment); Tubing changed 09/11/20 0602   Drainage Description Green 09/11/20 1430   Water Flush Volume (mL) 50 mL 09/09/20 0929   Drainage Chamber Level (ml) 200 ml 09/11/20 1800   Output (ml) 150 ml 09/11/20 1800               Flatus: Patient does have flatus present. Stool:  3 occurrences. Characteristics:  Stool Assessment  Stool Color: Red  Stool Appearance: Watery  Stool Amount: Medium  Stool Source/Status: Rectum    Emesis: 0 occurrences.     Characteristics:        VITAL SIGNS  Patient Vitals for the past 12 hrs:   Temp Pulse Resp BP SpO2   09/11/20 1752 98.2 °F (36.8 °C) 83 17 (!) 169/76 98 %   09/11/20 1600  (!) 101      09/11/20 1351 97.6 °F (36.4 °C) 60 17 (!) 141/65 98 %   09/11/20 1200  75      09/11/20 0905 97.6 °F (36.4 °C) 70 18 145/68 98 %   09/11/20 0800  85          Pain Assessment  Pain Intensity 1: 5 (09/11/20 1822)  Pain Location 1: Abdomen  Pain Intervention(s) 1: Medication (see MAR)  Patient Stated Pain Goal: 2    Ambulating  Yes    Shift report given to oncoming nurse at the bedside.     Dogu Conner

## 2020-09-11 NOTE — PROGRESS NOTES
PLAN:  NGT to LIWS  Diet NPO  IVFs  Zosyn  SCD/IS/Protonix  for prophylactic measures  Ok for OOB to chair  Pain/nausea control  Monitor labs  Monitor drain output  Drain care  Ok to leave incision GELACIO  Leave mcintyre for now   TPN    ASSESSMENT:  Admit Date: 9/7/2020   4 Days Post-Op  Procedure(s):  LAPAROTOMY EXPLORATORY /  CHOLECYSTECTOMY    Principal Problem:    Acute blood loss anemia (9/2/2020)    Active Problems:    Essential hypertension (5/8/2018)      Mixed hyperlipidemia (5/8/2018)      Dietz's esophagus without dysplasia (8/12/2020)      Malignant neoplasm of head of pancreas ([address] (8/31/2020)      Iron deficiency anemia (9/1/2020)      Pancreatic cancer ([address] (9/2/2020)      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 Mcintyre -380mL UOP/24h. NGT with 100mL out. Left GILDA with 90mL serosanguinous otuput;  right GILDA with 170mL bilious output (drains around duodenal stump). WBC 14, H/H 9.7/28. Cr 1.87. BS 200s. On Fentanyl and Octreotide gtts. LFTs pending today. 9/9/2020  POD#2  Extubated yesterday. Alert in bed w/o complaints. Oxygenating well on 2L NC; congested. NGT with 750mL out; dark/bilious. Left GILDA with 45mL serosanguinous otuput;  right GILDA with 110mL serous/bile tinged output (drains around duodenal stump). WBC 13.9, H/H 8.2/25. Cr 1.1. Tbili 2.8.     9/9/2020  POD#3  Transferred to floor yesterday. Alert in bed w/o complaints. Pain controlled. Oxygenating well on 2L NC; congested. NGT with 750mL out; dark/bilious. Left GILDA with 50mL serosanguinous otuput;  right GILDA with 45mL serous output. WBC 14.8, H/H 8.1/25. 6. Tbili up to 5.1. Denies flatus. 9/11/2020  POD#4  Alert in chair w/o complaints. Pain controlled.  Oxygenating well on 2L NC; NGT with 600mL out; dark/bilious. Left GILDA with 55mL serosanguinous otuput;  right GILDA with 35mL serous output. Also, some drainage from around Right tube. WBC 14.3, H/H 8.1/25. 6. Tbili 4.5. +BMx2- bloody. Intake/Output Summary (Last 24 hours) at 9/11/2020 1410  Last data filed at 9/11/2020 1110  Gross per 24 hour   Intake 4235.46 ml   Output 3555 ml   Net 680.46 ml     OBJECTIVE:  Constitutional: Alert oriented cooperative patient in no acute distress; appears stated age   Visit Vitals  BP (!) 141/65 (BP 1 Location: Right arm, BP Patient Position: At rest)   Pulse 60   Temp 97.6 °F (36.4 °C)   Resp 17   Ht 5' 7\" (1.702 m)   Wt 176 lb 9.4 oz (80.1 kg)   SpO2 98%   BMI 27.66 kg/m²     Eyes:Sclera are clear. ENMT: no external lesions gross hearing normal; no obvious neck masses, no ear or lip lesions, +NGT  CV: RRR. Normal perfusion  Resp: No JVD. Breathing is  non-labored; no audible wheezing. GI: soft and non-distended, staples c/d/i with staples, GILDA bilat  : mcintyre intact; urine clear  Musculoskeletal: unremarkable with normal function. No embolic signs or cyanosis.    Neuro:  Oriented; moves all 4; no focal deficits  Psychiatric: normal affect and mood, no memory impairment      Patient Vitals for the past 24 hrs:   BP Temp Pulse Resp SpO2 Weight   09/11/20 1351 (!) 141/65 97.6 °F (36.4 °C) 60 17 98 %    09/11/20 1200   75      09/11/20 0905 145/68 97.6 °F (36.4 °C) 70 18 98 %    09/11/20 0800   85      09/11/20 0748     97 %    09/11/20 0428      176 lb 9.4 oz (80.1 kg)   09/11/20 0308 132/84 98.7 °F (37.1 °C) 85 17 94 %    09/10/20 2234 125/68 98.1 °F (36.7 °C) 84 16 96 %    09/10/20 2017 138/78 98.3 °F (36.8 °C) 70 17 92 %    09/10/20 1920     99 %    09/10/20 1507 155/63 98.4 °F (36.9 °C) (!) 59 18 99 %      Labs:    Recent Labs     09/11/20  0359   WBC 14.3*   HGB 8.1*         K 3.3*   *   CO2 28   BUN 17   CREA 0.85   *   TBILI 4.5*   ALT 38   AP 62 Signed:  Roxana Gibbons NP

## 2020-09-11 NOTE — OP NOTES
[facility]  OPERATIVE REPORT    Name:  Jose Lr  MR#:  [id number]  :  1955  ACCOUNT #:  [de-identified]  DATE OF SERVICE:  2020    PREOPERATIVE DIAGNOSES:  Hypotension; hemodynamic instability secondary to bleeding duodenal ulcer, tumor. POSTOPERATIVE DIAGNOSES:  Extensive tumor of the pancreatic head and duodenum with extensive hepatic metastasis, active bleeding of tumor bed second portion of the duodenum. PROCEDURE PERFORMED:  Exploratory laparotomy, distal gastrectomy, partial duodenectomy, oversew of active bleeding vessel of the second portion of the duodenum, cholecystectomy, common bile duct exploration, gastrojejunostomy creation, closure of duodenal stump and drain placement. SURGEON:  Jaden Chandler MD    ASSISTANT:  None. ANESTHESIA:  general.    COMPLICATIONS:  none. SPECIMENS REMOVED:  Distal stomach, proximal duodenum. IMPLANTS:  none. ESTIMATED BLOOD LOSS:  Greater than 1 liter. DRAINS:  Two flat 10-mm GILDA drains, one anterior, one posterior to the duodenal stump. CONDITION AT COMPLETION:  Critical.    INDICATION:  This patient is a 71-year-old male with a known cancer of the duodenum and pancreatic head and metastatic disease of the liver who had previously sustained a bleed from the gastroduodenal artery, presumably which was embolized several days prior to this procedure. Unfortunately, bleeding resumed. The patient was readmitted to the hospital.  Hypotension, initial stability gave way to increased heart rate and what appeared to be increase in severity of the bleed with hypotension. Surgery was consulted for urgent surgical therapy. Options of surgery were explored, but it was felt no further embolization was possible and Gastroenterology felt they had nothing to offer. The patient was urgently taken to surgery.   Emergent transfusion had been started on the floor and was continued into the operating room.    PROCEDURE:  The patient was moved to the operating table where he underwent endotracheal intubation. Immediately upon intubation, the patient vomited approximately 600 mL of bright red blood. The patient also passed a large amount of blood per rectum during this initial period. He was transfused via the Level 1 blood transfuser. Please see Anesthesiology's notes for details of those procedures. The abdomen was prepped and draped in sterile fashion. Soriano catheter was placed for bladder decompression and resuscitation monitoring. Upper midline incision was made with a #10 blade. Dissection through the subcutaneous fascia to the abdominal cavity was performed with electrocautery. The abdominal cavity was entered. Bookwalter retractor was placed for proper visualization of the upper abdominal structures. The small bowel was filled with dark blood. There was no intraperitoneal blood. Palpation confirmed the presence of large tumor comprising the majority of the duodenal structure and the pancreatic head. Mobilization along the greater curvature of the stomach was performed. A LigaSure device was used to take down the gastrocolic ligament and entered the lesser sac. The hepatic flexure of the colon was completely mobilized in order to gain access to the duodenum. The duodenum was kocherized from lateral to medial completely. A Poplar maneuver was performed identifying the common bile duct, the hepatic artery and the portal vein. Compression of the hepatic artery did not show any sign of improving hemodynamics. At this point, the lesser curvature of the stomach was completely mobilized as well around to the hepatic artery. Portions of the gastroduodenal artery were found ligated during this. The patient continued to show instability. At this point, a linear stapling device was used to divide the distal stomach proximal to the pylorus.   An NG tube in the stomach was positioned in the mid body of the stomach. The duodenum was then continued to be mobilized as it was inspected. The tumor appeared to be eroding into the second portion of the duodenum at the pancreatic side. The distal stomach and duodenal stump was then opened down the anterior surface vertically along its length in order to expose the mucosa. The pylorus, first and second portion of the duodenum and active bleeding vessel was identified on the pancreatic side. Attempt to place a screw suture here was difficult due to the necrotic nature of the tumor present. A piece of Surgicel gauze was placed at the site and pledgeted into place with interrupted 3-0 PDS suture. This controlled the active bleeding. Continued inspection was performed. It was difficult to identify the location of the ampulla of the common bile duct or the sphincter of Oddi due to the mass effect of the tumor itself. Cholecystectomy was performed for taking the gallbladder off the liver bed in dome down fashion. The cystic duct was identified. It was clamped and divided and the gallbladder removed from the field. This allowed identification of the common bile duct. The common bile duct was very small, approximately 3 mm in largest diameter and unfortunately a T-tube of this size was not available. The common bile duct was traced down to the duodenal head where what appeared to be the location the ampulla was found. Closure of the duodenal stump was then performed. A TA 60 stapler was used to staple across the duodenal stump removing the proximal portion of the pylorus and the first portion of the duodenum and preserving the location of the bile duct ampulla. The staple line was oversewn with a running 3-0 PDS suture. Hemostasis was achieved. Copious irrigation of the abdomen was performed. Palpation of the right lobe of the liver showed extensive metastatic deposits comprising the majority of the liver parenchyma.   A #10 flat GILDA drain was positioned behind the pancreas and one was placed above the duodenal stump. Both were brought out through  opposing lateral stab incisions and placed to bulb suction and was secured to the skin with 0 silk. Hemodynamic stability was achieved in this portion of the case, normal blood pressure and declining heart rate into the 80s and 90s. The peritoneal cavity was closed with a running #1 looped PDS suture and the fascia was reapproximated at the midline and the skin and subcu were irrigated and closed with skin staples. The patient was urgently returned to the intensive care unit where resuscitation could be continued. ADDENDUM ( Job [id number])    The gastrojejunostomy was performed by bringing the jejunum approximately 40 cm distal to the ligament of Treitz up to the greater curvature of the stomach, here a side-to-side anastomosis was performed. 3-0 silk suture was used to align the small intestine loop with the greater curvature of the stomach approximately 6 cm in length. An enterotomy was created into each lumen and DHAVAL 75 stapler was used to perform a side-to-side stapled anastomosis here, hemostasis was assured. The common enterotomy was closed with interrupted 3-0 silk suture. This was imbricated with a second layer of 3-0 silk suture. The NG tube was positioned along the orifice of the new gastrojejunostomy.       MD EMELI [email or web address]  D:  09/11/2020 9:03  T:  09/11/2020 13:33  JOB #:  [id number]

## 2020-09-11 NOTE — PROGRESS NOTES
Date of Outreach Update:  Paolo Segal was seen and assessed. MEWS Score: 1 (09/11/20 0905)  Vitals:    09/11/20 0800 09/11/20 0905 09/11/20 1200 09/11/20 1351   BP:  145/68  (!) 141/65   Pulse: 85 70 75 60   Resp:  18  17   Temp:  97.6 °F (36.4 °C)  97.6 °F (36.4 °C)   SpO2:  98%  98%   Weight:       Height:             Pain Assessment  Pain Intensity 1: 6 (09/11/20 0401)  Pain Location 1: Abdomen  Pain Intervention(s) 1: Medication (see MAR)  Patient Stated Pain Goal: 2      Previous Outreach assessment has been reviewed. There have been no significant clinical changes since the completion of the last dated Outreach assessment. Will continue to follow up per outreach protocol.     Signed By:   Sruthi Ramos RN    September 11, 2020 4:01 PM

## 2020-09-12 ENCOUNTER — APPOINTMENT (OUTPATIENT)
Dept: GENERAL RADIOLOGY | Age: 65
DRG: 326 | End: 2020-09-12
Attending: INTERNAL MEDICINE
Payer: COMMERCIAL

## 2020-09-12 ENCOUNTER — HOSPITAL ENCOUNTER (OUTPATIENT)
Dept: INFUSION THERAPY | Age: 65
Discharge: HOME | End: 2020-09-12

## 2020-09-12 LAB
ALBUMIN SERPL-MCNC: 1.7 G/DL (ref 3.2–4.6)
ALBUMIN/GLOB SERPL: 0.5 {RATIO} (ref 1.2–3.5)
ALP SERPL-CCNC: 108 U/L (ref 50–136)
ALT SERPL-CCNC: 50 U/L (ref 12–65)
ANION GAP SERPL CALC-SCNC: 5 MMOL/L (ref 7–16)
AST SERPL-CCNC: 30 U/L (ref 15–37)
BILIRUB SERPL-MCNC: 2.9 MG/DL (ref 0.2–1.1)
BUN SERPL-MCNC: 12 MG/DL (ref 8–23)
CALCIUM SERPL-MCNC: 7.5 MG/DL (ref 8.3–10.4)
CHLORIDE SERPL-SCNC: 107 MMOL/L (ref 98–107)
CO2 SERPL-SCNC: 28 MMOL/L (ref 21–32)
CREAT SERPL-MCNC: 0.79 MG/DL (ref 0.8–1.5)
ERYTHROCYTE [DISTWIDTH] IN BLOOD BY AUTOMATED COUNT: 16.7 % (ref 11.9–14.6)
GLOBULIN SER CALC-MCNC: 3.1 G/DL (ref 2.3–3.5)
GLUCOSE BLD STRIP.AUTO-MCNC: 161 MG/DL (ref 65–100)
GLUCOSE BLD STRIP.AUTO-MCNC: 174 MG/DL (ref 65–100)
GLUCOSE BLD STRIP.AUTO-MCNC: 175 MG/DL (ref 65–100)
GLUCOSE BLD STRIP.AUTO-MCNC: 178 MG/DL (ref 65–100)
GLUCOSE SERPL-MCNC: 138 MG/DL (ref 65–100)
HCT VFR BLD AUTO: 26.3 % (ref 41.1–50.3)
HGB BLD-MCNC: 8.5 G/DL (ref 13.6–17.2)
MCH RBC QN AUTO: 29.7 PG (ref 26.1–32.9)
MCHC RBC AUTO-ENTMCNC: 32.3 G/DL (ref 31.4–35)
MCV RBC AUTO: 92 FL (ref 79.6–97.8)
NRBC # BLD: 0.08 K/UL (ref 0–0.2)
PHOSPHATE SERPL-MCNC: 2 MG/DL (ref 2.3–3.7)
PLATELET # BLD AUTO: 206 K/UL (ref 150–450)
PMV BLD AUTO: 11.3 FL (ref 9.4–12.3)
POTASSIUM SERPL-SCNC: 3.1 MMOL/L (ref 3.5–5.1)
PROT SERPL-MCNC: 4.8 G/DL (ref 6.3–8.2)
RBC # BLD AUTO: 2.86 M/UL (ref 4.23–5.6)
SODIUM SERPL-SCNC: 140 MMOL/L (ref 136–145)
TRIGL SERPL-MCNC: 220 MG/DL (ref 35–150)
WBC # BLD AUTO: 15.9 K/UL (ref 4.3–11.1)

## 2020-09-12 PROCEDURE — 74011000250 HC RX REV CODE- 250: Performed by: SURGERY

## 2020-09-12 PROCEDURE — 77010033678 HC OXYGEN DAILY

## 2020-09-12 PROCEDURE — 74011636637 HC RX REV CODE- 636/637: Performed by: PHYSICIAN ASSISTANT

## 2020-09-12 PROCEDURE — 84100 ASSAY OF PHOSPHORUS: CPT

## 2020-09-12 PROCEDURE — 74011250636 HC RX REV CODE- 250/636: Performed by: SURGERY

## 2020-09-12 PROCEDURE — 94760 N-INVAS EAR/PLS OXIMETRY 1: CPT

## 2020-09-12 PROCEDURE — 80053 COMPREHEN METABOLIC PANEL: CPT

## 2020-09-12 PROCEDURE — 74011000250 HC RX REV CODE- 250: Performed by: NURSE PRACTITIONER

## 2020-09-12 PROCEDURE — 82962 GLUCOSE BLOOD TEST: CPT

## 2020-09-12 PROCEDURE — 94640 AIRWAY INHALATION TREATMENT: CPT

## 2020-09-12 PROCEDURE — 65660000000 HC RM CCU STEPDOWN

## 2020-09-12 PROCEDURE — 74011250636 HC RX REV CODE- 250/636: Performed by: INTERNAL MEDICINE

## 2020-09-12 PROCEDURE — 85027 COMPLETE CBC AUTOMATED: CPT

## 2020-09-12 PROCEDURE — 2709999900 HC NON-CHARGEABLE SUPPLY

## 2020-09-12 PROCEDURE — 74011000250 HC RX REV CODE- 250: Performed by: INTERNAL MEDICINE

## 2020-09-12 PROCEDURE — 74011250637 HC RX REV CODE- 250/637: Performed by: SURGERY

## 2020-09-12 PROCEDURE — 84478 ASSAY OF TRIGLYCERIDES: CPT

## 2020-09-12 PROCEDURE — 74011000258 HC RX REV CODE- 258: Performed by: SURGERY

## 2020-09-12 PROCEDURE — 71045 X-RAY EXAM CHEST 1 VIEW: CPT

## 2020-09-12 PROCEDURE — C9113 INJ PANTOPRAZOLE SODIUM, VIA: HCPCS | Performed by: INTERNAL MEDICINE

## 2020-09-12 RX ORDER — GUAIFENESIN 100 MG/5ML
100 SOLUTION ORAL
Status: DISCONTINUED | OUTPATIENT
Start: 2020-09-12 | End: 2020-09-16 | Stop reason: HOSPADM

## 2020-09-12 RX ADMIN — PIPERACILLIN AND TAZOBACTAM 4.5 G: 4; .5 INJECTION, POWDER, FOR SOLUTION INTRAVENOUS at 14:34

## 2020-09-12 RX ADMIN — INSULIN LISPRO 2 UNITS: 100 INJECTION, SOLUTION INTRAVENOUS; SUBCUTANEOUS at 23:15

## 2020-09-12 RX ADMIN — SODIUM CHLORIDE 40 MG: 9 INJECTION INTRAMUSCULAR; INTRAVENOUS; SUBCUTANEOUS at 09:15

## 2020-09-12 RX ADMIN — SODIUM CHLORIDE: 900 INJECTION, SOLUTION INTRAVENOUS at 10:12

## 2020-09-12 RX ADMIN — SODIUM CHLORIDE 40 MG: 9 INJECTION INTRAMUSCULAR; INTRAVENOUS; SUBCUTANEOUS at 21:05

## 2020-09-12 RX ADMIN — POTASSIUM CHLORIDE: 2 INJECTION, SOLUTION, CONCENTRATE INTRAVENOUS at 17:59

## 2020-09-12 RX ADMIN — PIPERACILLIN AND TAZOBACTAM 4.5 G: 4; .5 INJECTION, POWDER, FOR SOLUTION INTRAVENOUS at 05:35

## 2020-09-12 RX ADMIN — HYDROMORPHONE HYDROCHLORIDE 1 MG: 1 INJECTION, SOLUTION INTRAMUSCULAR; INTRAVENOUS; SUBCUTANEOUS at 23:15

## 2020-09-12 RX ADMIN — HYDROMORPHONE HYDROCHLORIDE 0.5 MG: 1 INJECTION, SOLUTION INTRAMUSCULAR; INTRAVENOUS; SUBCUTANEOUS at 19:15

## 2020-09-12 RX ADMIN — OCTREOTIDE ACETATE 50 MCG/HR: 500 INJECTION, SOLUTION INTRAVENOUS; SUBCUTANEOUS at 05:37

## 2020-09-12 RX ADMIN — HYDROMORPHONE HYDROCHLORIDE 1 MG: 1 INJECTION, SOLUTION INTRAMUSCULAR; INTRAVENOUS; SUBCUTANEOUS at 05:29

## 2020-09-12 RX ADMIN — OCTREOTIDE ACETATE 50 MCG/HR: 500 INJECTION, SOLUTION INTRAVENOUS; SUBCUTANEOUS at 19:16

## 2020-09-12 RX ADMIN — INSULIN LISPRO 2 UNITS: 100 INJECTION, SOLUTION INTRAVENOUS; SUBCUTANEOUS at 12:53

## 2020-09-12 RX ADMIN — SODIUM CHLORIDE SOLN NEBU 3% 4 ML: 3 NEBU SOLN at 20:00

## 2020-09-12 RX ADMIN — GUAIFENESIN 100 MG: 200 SOLUTION ORAL at 19:16

## 2020-09-12 RX ADMIN — ALBUTEROL SULFATE 2.5 MG: 2.5 SOLUTION RESPIRATORY (INHALATION) at 07:55

## 2020-09-12 RX ADMIN — HYDROMORPHONE HYDROCHLORIDE 1 MG: 1 INJECTION, SOLUTION INTRAMUSCULAR; INTRAVENOUS; SUBCUTANEOUS at 00:19

## 2020-09-12 RX ADMIN — SODIUM CHLORIDE SOLN NEBU 3% 4 ML: 3 NEBU SOLN at 07:56

## 2020-09-12 RX ADMIN — INSULIN LISPRO 2 UNITS: 100 INJECTION, SOLUTION INTRAVENOUS; SUBCUTANEOUS at 17:45

## 2020-09-12 RX ADMIN — ALBUTEROL SULFATE 2.5 MG: 2.5 SOLUTION RESPIRATORY (INHALATION) at 20:00

## 2020-09-12 RX ADMIN — PIPERACILLIN AND TAZOBACTAM 4.5 G: 4; .5 INJECTION, POWDER, FOR SOLUTION INTRAVENOUS at 23:17

## 2020-09-12 RX ADMIN — I.V. FAT EMULSION 250 ML: 20 EMULSION INTRAVENOUS at 17:45

## 2020-09-12 RX ADMIN — INSULIN LISPRO 2 UNITS: 100 INJECTION, SOLUTION INTRAVENOUS; SUBCUTANEOUS at 09:15

## 2020-09-12 NOTE — PROGRESS NOTES
Nutrition Note  This assessment was completed remotely   TPN Management- Chart reviewed  NGT dc'd and started on clear liquid diet. Will have decreased K losses with NG tube out.  K 3.1, Phos 2-current TPN provides ~ 56 meq K and Phos per day. May benefit from increased K and Phos in TPN  POC glucoses: 140-184 mg/dl. Received 4 units SSI yesterday and 4 units thus far today. Trig 220 mg/dl-could continue daily lipids for now. Corrected calcium 9.3-current TPN contains no calcium. May benefit from addition of calcium to TPN  Intervention:  Supplement KPhos per protocols. Check Mg and Phos lab in AM  TPN: Increase KPhos to 35 meq/L.  Add 15 meq KCl/L to provide a total of ~94 meq K/d and ~66 meq Phos/d  Add 4.5 meq Ca/L       Electronically signed by Lauren Gallo RD on 9/12/2020 at 1:34 PM    Contact: Flynn Klein, [facility] [place] [address], [address] [place] [address]

## 2020-09-12 NOTE — PROGRESS NOTES
PLAN:  DC NG  Clear Liquids  IVFs  Zosyn  SCD/IS/Protonix  for prophylactic measures  Ok for OOB to chair  Pain/nausea control  Monitor labs  Monitor drain output  Drain care  Ok to leave incision GELACIO  Leave mcintyre for now   TPN    ASSESSMENT:  Admit Date: 9/7/2020   5 Days Post-Op  Procedure(s):  LAPAROTOMY EXPLORATORY /  CHOLECYSTECTOMY    Principal Problem:    Acute blood loss anemia (9/2/2020)    Active Problems:    Essential hypertension (5/8/2018)      Mixed hyperlipidemia (5/8/2018)      Dietz's esophagus without dysplasia (8/12/2020)      Malignant neoplasm of head of pancreas ([address] (8/31/2020)      Iron deficiency anemia (9/1/2020)      Pancreatic cancer ([facility] (9/2/2020)      Duodenal mass (9/2/2020)      Pancreatitis (9/7/2020)      Hypovolemic shock ([address] (9/8/2020)      Respiratory failure, post-operative ([facility] (9/8/2020)      Duodenal ulcer with hemorrhage (9/8/2020)         SUBJECTIVE:  9/8/2020  POD#1 AF, soft BP, 30% vent-- plans to extubate today. Mcintyre -380mL UOP/24h. NGT with 100mL out. Left GILDA with 90mL serosanguinous otuput;  right GILDA with 170mL bilious output (drains around duodenal stump). WBC 14, H/H 9.7/28. Cr 1.87. BS 200s. On Fentanyl and Octreotide gtts. LFTs pending today. 9/9/2020  POD#2  Extubated yesterday. Alert in bed w/o complaints. Oxygenating well on 2L NC; congested. NGT with 750mL out; dark/bilious. Left GILDA with 45mL serosanguinous otuput;  right GILDA with 110mL serous/bile tinged output (drains around duodenal stump). WBC 13.9, H/H 8.2/25. Cr 1.1. Tbili 2.8.     9/9/2020  POD#3  Transferred to floor yesterday. Alert in bed w/o complaints. Pain controlled. Oxygenating well on 2L NC; congested. NGT with 750mL out; dark/bilious. Left GILDA with 50mL serosanguinous otuput;  right GILDA with 45mL serous output. WBC 14.8, H/H 8.1/25. 6. Tbili up to 5.1. Denies flatus. 9/11/2020  POD#4  Alert in chair w/o complaints. Pain controlled.  Oxygenating well on 2L NC; NGT with 600mL out; dark/bilious. Left GILDA with 55mL serosanguinous otuput;  right GILDA with 35mL serous output. Also, some drainage from around Right tube. WBC 14.3, H/H 8.1/25. 6. Tbili 4.5. +BMx2- bloody. 9/12/2020  POD#5 Alert in bed w/o complaints. Pain controlled. Oxygenating well on 2L NC; NGT with 375mL out; dark/bilious. Left GILDA with 100mL serosanguinous otuput;  right GILDA with 55mL serous output. Also, some drainage from around Right tube. WBC 15.9, H/H 8.5/26. 3. Tbili 2.9. +BMx2- bloody. Intake/Output Summary (Last 24 hours) at 9/12/2020 0830  Last data filed at 9/12/2020 6515  Gross per 24 hour   Intake 3376.47 ml   Output 4280 ml   Net -903.53 ml     OBJECTIVE:  Constitutional: Alert oriented cooperative patient in no acute distress; appears stated age   Visit Vitals  /71   Pulse 63   Temp 97.8 °F (36.6 °C)   Resp 19   Ht 5' 7\" (1.702 m)   Wt 178 lb (80.7 kg)   SpO2 98%   BMI 27.88 kg/m²     Eyes:Sclera are clear. ENMT: no external lesions gross hearing normal; no obvious neck masses, no ear or lip lesions, +NGT  CV: RRR. Normal perfusion  Resp: No JVD. Breathing is  non-labored; no audible wheezing. GI: soft and non-distended, staples c/d/i with staples, GILDA bilat  : mcintyre intact; urine clear  Musculoskeletal: unremarkable with normal function. No embolic signs or cyanosis.    Neuro:  Oriented; moves all 4; no focal deficits  Psychiatric: normal affect and mood, no memory impairment      Patient Vitals for the past 24 hrs:   BP Temp Pulse Resp SpO2 Weight   09/12/20 0757     98 %    09/12/20 0736 139/71 97.8 °F (36.6 °C) 63 19 96 %    09/12/20 0636      178 lb (80.7 kg)   09/12/20 0357 (!) 156/76 98 °F (36.7 °C) 62 18 97 %    09/11/20 2212 (!) 154/80 97.7 °F (36.5 °C) 72 20 98 %    09/11/20 2133     99 %    09/11/20 2010 (!) 163/78 98.3 °F (36.8 °C) 65 18 99 %    09/11/20 1752 (!) 169/76 98.2 °F (36.8 °C) 83 17 98 %    09/11/20 1600   (!) 101      09/11/20 1351 (!) 141/65 97.6 °F (36.4 °C) 60 17 98 %    09/11/20 1200   75      09/11/20 0905 145/68 97.6 °F (36.4 °C) 70 18 98 %      Labs:    Recent Labs     09/12/20  0519   WBC 15.9*   HGB 8.5*         K 3.1*      CO2 28   BUN 12   CREA 0.79*   *   TBILI 2.9*   ALT 50          Signed:  Ramon Schuler, NP

## 2020-09-12 NOTE — PROGRESS NOTES
END OF SHIFT NOTE:    INTAKE/OUTPUT  09/11 0701 - 09/12 0700  In: 3376.5 [P.O.:180; I.V.:3196.5]  Out: 4330 [Urine:3800; Drains:155]  Voiding: NO  Catheter: YES  Drain:   Richard-Garza Drain 09/07/20 Anterior;Right Abdomen (Active)   Site Assessment Drainage (comment) 09/11/20 2000   Dressing Status New drainage; Wet 09/11/20 2000   Status Other (comment); Patent; Charged;Draining 09/11/20 2000   Drainage Color Serosanguinous 09/12/20 0605   Output (ml) 15 ml 09/12/20 0605       Richard-Garza Drain 09/07/20 Anterior; Left Abdomen (Active)   Site Assessment Clean, dry, & intact 09/11/20 2000   Dressing Status Intact;Dry 09/11/20 2000   Status Charged; Patent;Draining 09/11/20 2000   Drainage Color Serosanguinous 09/12/20 0605   Output (ml) 30 ml 09/12/20 0605       Nasogastric Tube 09/07/20 (Active)   Site Assessment Clean, dry, & intact 09/11/20 2000   Securement Device Tape 09/11/20 2000   G Port Status Intermittent Suction 09/11/20 2000   External Insertion Jag (cms) 78 cms 09/11/20 2000   Action Taken Other (comment); Tubing changed 09/11/20 0602   Drainage Description Green 09/11/20 2000   Water Flush Volume (mL) 50 mL 09/09/20 0929   Drainage Chamber Level (ml) 375 ml 09/12/20 0605   Output (ml) 125 ml 09/12/20 0605               Flatus: Patient does have flatus present. Stool:  2 occurrences. Characteristics:  Stool Assessment  Stool Color: Maroon  Stool Appearance: Bloody  Stool Amount: Medium  Stool Source/Status: Rectum    Emesis: 0 occurrences.     Characteristics:        VITAL SIGNS  Patient Vitals for the past 12 hrs:   Temp Pulse Resp BP SpO2   09/12/20 0357 98 °F (36.7 °C) 62 18 (!) 156/76 97 %   09/11/20 2212 97.7 °F (36.5 °C) 72 20 (!) 154/80 98 %   09/11/20 2133     99 %   09/11/20 2010 98.3 °F (36.8 °C) 65 18 (!) 163/78 99 %       Pain Assessment  Pain Intensity 1: 7 (09/12/20 0529)  Pain Location 1: Abdomen  Pain Intervention(s) 1: Medication (see MAR)  Patient Stated Pain Goal: 2    Ambulating  No    Shift report to be given to oncoming nurse at the bedside.     [address]

## 2020-09-12 NOTE — PROGRESS NOTES
Patient had a bloody BM. This is the fourth occult stool on today. RN concerned d/t recent blood loss patient experienced. Surgical on call notified. New orders received. Hemoglobin ordered STAT. 2343: Hemoglobin is 8.6. This morning it was 8. 1. Surgical on call notified of result. No new orders received.

## 2020-09-13 ENCOUNTER — APPOINTMENT (OUTPATIENT)
Dept: GENERAL RADIOLOGY | Age: 65
DRG: 326 | End: 2020-09-13
Attending: INTERNAL MEDICINE
Payer: COMMERCIAL

## 2020-09-13 LAB
ALBUMIN SERPL-MCNC: 1.7 G/DL (ref 3.2–4.6)
ALBUMIN/GLOB SERPL: 0.5 {RATIO} (ref 1.2–3.5)
ALP SERPL-CCNC: 185 U/L (ref 50–136)
ALT SERPL-CCNC: 50 U/L (ref 12–65)
ANION GAP SERPL CALC-SCNC: 6 MMOL/L (ref 7–16)
AST SERPL-CCNC: 22 U/L (ref 15–37)
BILIRUB SERPL-MCNC: 2.1 MG/DL (ref 0.2–1.1)
BUN SERPL-MCNC: 10 MG/DL (ref 8–23)
CALCIUM SERPL-MCNC: 7.4 MG/DL (ref 8.3–10.4)
CHLORIDE SERPL-SCNC: 102 MMOL/L (ref 98–107)
CO2 SERPL-SCNC: 28 MMOL/L (ref 21–32)
CREAT SERPL-MCNC: 0.78 MG/DL (ref 0.8–1.5)
ERYTHROCYTE [DISTWIDTH] IN BLOOD BY AUTOMATED COUNT: 16.5 % (ref 11.9–14.6)
GLOBULIN SER CALC-MCNC: 3.1 G/DL (ref 2.3–3.5)
GLUCOSE BLD STRIP.AUTO-MCNC: 167 MG/DL (ref 65–100)
GLUCOSE BLD STRIP.AUTO-MCNC: 176 MG/DL (ref 65–100)
GLUCOSE BLD STRIP.AUTO-MCNC: 207 MG/DL (ref 65–100)
GLUCOSE BLD STRIP.AUTO-MCNC: 215 MG/DL (ref 65–100)
GLUCOSE SERPL-MCNC: 144 MG/DL (ref 65–100)
HCT VFR BLD AUTO: 27.7 % (ref 41.1–50.3)
HGB BLD-MCNC: 8.6 G/DL (ref 13.6–17.2)
MAGNESIUM SERPL-MCNC: 2 MG/DL (ref 1.8–2.4)
MCH RBC QN AUTO: 28.8 PG (ref 26.1–32.9)
MCHC RBC AUTO-ENTMCNC: 31 G/DL (ref 31.4–35)
MCV RBC AUTO: 92.6 FL (ref 79.6–97.8)
NRBC # BLD: 0.06 K/UL (ref 0–0.2)
PHOSPHATE SERPL-MCNC: 2.2 MG/DL (ref 2.3–3.7)
PLATELET # BLD AUTO: 213 K/UL (ref 150–450)
PMV BLD AUTO: 11.7 FL (ref 9.4–12.3)
POTASSIUM SERPL-SCNC: 3 MMOL/L (ref 3.5–5.1)
PROT SERPL-MCNC: 4.8 G/DL (ref 6.3–8.2)
RBC # BLD AUTO: 2.99 M/UL (ref 4.23–5.6)
SODIUM SERPL-SCNC: 136 MMOL/L (ref 136–145)
WBC # BLD AUTO: 16.6 K/UL (ref 4.3–11.1)

## 2020-09-13 PROCEDURE — 74011250637 HC RX REV CODE- 250/637: Performed by: NURSE PRACTITIONER

## 2020-09-13 PROCEDURE — 2709999900 HC NON-CHARGEABLE SUPPLY

## 2020-09-13 PROCEDURE — 74011000250 HC RX REV CODE- 250: Performed by: SURGERY

## 2020-09-13 PROCEDURE — 85027 COMPLETE CBC AUTOMATED: CPT

## 2020-09-13 PROCEDURE — 80053 COMPREHEN METABOLIC PANEL: CPT

## 2020-09-13 PROCEDURE — 74011000250 HC RX REV CODE- 250: Performed by: INTERNAL MEDICINE

## 2020-09-13 PROCEDURE — 71045 X-RAY EXAM CHEST 1 VIEW: CPT

## 2020-09-13 PROCEDURE — 94760 N-INVAS EAR/PLS OXIMETRY 1: CPT

## 2020-09-13 PROCEDURE — 82962 GLUCOSE BLOOD TEST: CPT

## 2020-09-13 PROCEDURE — 74011250636 HC RX REV CODE- 250/636: Performed by: SURGERY

## 2020-09-13 PROCEDURE — 74011250636 HC RX REV CODE- 250/636: Performed by: INTERNAL MEDICINE

## 2020-09-13 PROCEDURE — 65660000000 HC RM CCU STEPDOWN

## 2020-09-13 PROCEDURE — 97530 THERAPEUTIC ACTIVITIES: CPT

## 2020-09-13 PROCEDURE — 74011000250 HC RX REV CODE- 250: Performed by: NURSE PRACTITIONER

## 2020-09-13 PROCEDURE — 84100 ASSAY OF PHOSPHORUS: CPT

## 2020-09-13 PROCEDURE — 83735 ASSAY OF MAGNESIUM: CPT

## 2020-09-13 PROCEDURE — 74011636637 HC RX REV CODE- 636/637: Performed by: PHYSICIAN ASSISTANT

## 2020-09-13 PROCEDURE — 94640 AIRWAY INHALATION TREATMENT: CPT

## 2020-09-13 PROCEDURE — 74011000258 HC RX REV CODE- 258: Performed by: SURGERY

## 2020-09-13 PROCEDURE — C9113 INJ PANTOPRAZOLE SODIUM, VIA: HCPCS | Performed by: INTERNAL MEDICINE

## 2020-09-13 RX ORDER — OXYCODONE AND ACETAMINOPHEN 7.5; 325 MG/1; MG/1
1 TABLET ORAL
Status: DISCONTINUED | OUTPATIENT
Start: 2020-09-13 | End: 2020-09-16 | Stop reason: HOSPADM

## 2020-09-13 RX ADMIN — I.V. FAT EMULSION 250 ML: 20 EMULSION INTRAVENOUS at 18:29

## 2020-09-13 RX ADMIN — INSULIN LISPRO 2 UNITS: 100 INJECTION, SOLUTION INTRAVENOUS; SUBCUTANEOUS at 21:52

## 2020-09-13 RX ADMIN — POTASSIUM CHLORIDE: 2 INJECTION, SOLUTION, CONCENTRATE INTRAVENOUS at 18:30

## 2020-09-13 RX ADMIN — HYDROMORPHONE HYDROCHLORIDE 0.5 MG: 1 INJECTION, SOLUTION INTRAMUSCULAR; INTRAVENOUS; SUBCUTANEOUS at 04:32

## 2020-09-13 RX ADMIN — SODIUM CHLORIDE SOLN NEBU 3% 4 ML: 3 NEBU SOLN at 08:01

## 2020-09-13 RX ADMIN — OXYCODONE HYDROCHLORIDE AND ACETAMINOPHEN 1 TABLET: 7.5; 325 TABLET ORAL at 18:49

## 2020-09-13 RX ADMIN — SODIUM CHLORIDE: 900 INJECTION, SOLUTION INTRAVENOUS at 09:36

## 2020-09-13 RX ADMIN — SODIUM CHLORIDE 40 MG: 9 INJECTION INTRAMUSCULAR; INTRAVENOUS; SUBCUTANEOUS at 09:37

## 2020-09-13 RX ADMIN — PIPERACILLIN AND TAZOBACTAM 4.5 G: 4; .5 INJECTION, POWDER, FOR SOLUTION INTRAVENOUS at 14:43

## 2020-09-13 RX ADMIN — PIPERACILLIN AND TAZOBACTAM 4.5 G: 4; .5 INJECTION, POWDER, FOR SOLUTION INTRAVENOUS at 06:00

## 2020-09-13 RX ADMIN — INSULIN LISPRO 4 UNITS: 100 INJECTION, SOLUTION INTRAVENOUS; SUBCUTANEOUS at 18:28

## 2020-09-13 RX ADMIN — SODIUM CHLORIDE SOLN NEBU 3% 4 ML: 3 NEBU SOLN at 20:06

## 2020-09-13 RX ADMIN — PIPERACILLIN AND TAZOBACTAM 4.5 G: 4; .5 INJECTION, POWDER, FOR SOLUTION INTRAVENOUS at 22:05

## 2020-09-13 RX ADMIN — ALBUTEROL SULFATE 2.5 MG: 2.5 SOLUTION RESPIRATORY (INHALATION) at 20:06

## 2020-09-13 RX ADMIN — INSULIN LISPRO 4 UNITS: 100 INJECTION, SOLUTION INTRAVENOUS; SUBCUTANEOUS at 12:35

## 2020-09-13 RX ADMIN — OXYCODONE HYDROCHLORIDE AND ACETAMINOPHEN 1 TABLET: 7.5; 325 TABLET ORAL at 09:35

## 2020-09-13 RX ADMIN — OCTREOTIDE ACETATE 50 MCG/HR: 500 INJECTION, SOLUTION INTRAVENOUS; SUBCUTANEOUS at 19:28

## 2020-09-13 RX ADMIN — INSULIN LISPRO 2 UNITS: 100 INJECTION, SOLUTION INTRAVENOUS; SUBCUTANEOUS at 09:37

## 2020-09-13 RX ADMIN — OCTREOTIDE ACETATE 50 MCG/HR: 500 INJECTION, SOLUTION INTRAVENOUS; SUBCUTANEOUS at 06:53

## 2020-09-13 RX ADMIN — SODIUM CHLORIDE 40 MG: 9 INJECTION INTRAMUSCULAR; INTRAVENOUS; SUBCUTANEOUS at 22:05

## 2020-09-13 NOTE — PROGRESS NOTES
END OF SHIFT NOTE:    INTAKE/OUTPUT  09/11 0701 - 09/12 0700  In: 3376.5 [P.O.:180; I.V.:3196.5]  Out: 4330 [Urine:3800; Drains:155]  Voiding: YES  Catheter: NO  Drain:   Richard-Garaz Drain 09/07/20 Anterior;Right Abdomen (Active)   Site Assessment Clean, dry, & intact 09/12/20 1453   Dressing Status Clean, dry, & intact; New 09/12/20 1453   Status Patent; Charged;Draining 09/12/20 1453   Drainage Color Serosanguinous 09/12/20 1453   Output (ml) 10 ml 09/12/20 1453       Richard-Garza Drain 09/07/20 Anterior; Left Abdomen (Active)   Site Assessment Clean, dry, & intact 09/12/20 1453   Dressing Status Clean, dry, & intact; New 09/12/20 1453   Status Patent; Charged;Draining 09/12/20 1453   Drainage Color Serosanguinous 09/12/20 1453   Output (ml) 25 ml 09/12/20 1453               Flatus: Patient does have flatus present. Stool:  7 occurrences. Characteristics:  Stool Assessment  Stool Color: Maroon  Stool Appearance: Loose  Stool Amount: Small  Stool Source/Status: Rectum    Emesis: 0 occurrences. Characteristics:        VITAL SIGNS  Patient Vitals for the past 12 hrs:   Temp Pulse Resp BP SpO2   09/12/20 2000     97 %   09/12/20 1928 98.3 °F (36.8 °C) 76 18 (!) 154/62 98 %   09/12/20 1619 98.3 °F (36.8 °C) 66 19 (!) 147/68 97 %   09/12/20 1140     96 %   09/12/20 0913  97          Pain Assessment  Pain Intensity 1: 6 (09/12/20 1915)  Pain Location 1: Abdomen  Pain Intervention(s) 1: Medication (see MAR)  Patient Stated Pain Goal: 0    Ambulating  Yes    Shift report given to oncoming nurse at the bedside.     Roshan Solano RN

## 2020-09-13 NOTE — PROGRESS NOTES
END OF SHIFT NOTE:    INTAKE/OUTPUT  09/12 0701 - 09/13 0700  In: 9266 [I.V.:3409]  Out: 7959 [Urine:3375; Drains:175]  Voiding: YES  Catheter: NO  Drain:   Richard-Garza Drain 09/07/20 Anterior;Right Abdomen (Active)   Site Assessment Clean, dry, & intact 09/13/20 0328   Dressing Status Clean, dry, & intact 09/13/20 0328   Status Patent; Charged;Draining 09/13/20 0328   Drainage Color Serosanguinous 09/13/20 0328   Output (ml) 30 ml 09/13/20 0328       Richard-Garza Drain 09/07/20 Anterior; Left Abdomen (Active)   Site Assessment Clean, dry, & intact 09/13/20 0328   Dressing Status Clean, dry, & intact 09/13/20 0328   Status Patent; Charged;Draining 09/13/20 0328   Drainage Color Serous 09/13/20 0328   Output (ml) 30 ml 09/13/20 0328               Flatus: Patient does have flatus present. Stool:  0 occurrences. Characteristics:      Emesis: 0 occurrences. Characteristics:        VITAL SIGNS  Patient Vitals for the past 12 hrs:   Temp Pulse Resp BP SpO2   09/13/20 0350 97.9 °F (36.6 °C) 60 18 (!) 144/62 98 %   09/12/20 2238 98 °F (36.7 °C) 66 18 124/66 98 %   09/12/20 2000     97 %   09/12/20 1928 98.3 °F (36.8 °C) 76 18 (!) 154/62 98 %       Pain Assessment  Pain Intensity 1: 7 (09/13/20 0431)  Pain Location 1: Incisional, Abdomen  Pain Intervention(s) 1: Medication (see MAR)  Patient Stated Pain Goal: 0    Ambulating  Yes    Shift report given to oncoming nurse at the bedside.     Maris King RN

## 2020-09-13 NOTE — PROGRESS NOTES
Problem: Mobility Impaired (Adult and Pediatric)  Goal: *Acute Goals and Plan of Care (Insert Text)  Outcome: Progressing Towards Goal  Note: LTG:  (1.)Mr. Rachid Mata will move from supine to sit and sit to supine and roll side to side, using log roll technique, with MODIFIED INDEPENDENCE within 7 treatment day(s). (2.)Mr. Rachid Mata will transfer from bed to chair and chair to bed with MODIFIED INDEPENDENCE using the least restrictive device within 7 treatment day(s). (3.)Mr. Rachid Mata will ambulate with MODIFIED INDEPENDENCE for 500 feet with the least restrictive device within 7 treatment day(s). (4.)Mr. Rachid Mata will participate in therapeutic activity/exercises x 25 minutes for increased strength within 7 treatment days. (5.)Mr. Rachid Mata will ascend and descend 2 stairs using 0 hand rail(s) with SUPERVISION to improve functional mobility and safety within 7 treatment day(s). ________________________________________________________________________________________________       PHYSICAL THERAPY: Daily Note and PM 9/13/2020  INPATIENT: PT Visit Days : 2  Payor: [address] [place] [address] / Plan: [address] / Product Type: PPO /       NAME/AGE/GENDER: Bob Moses is a 60 y.o. 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 Rachid Mata is sitting in recliner chair on arrival, spouse present. Pt on RA, states no pain at rest. Pt with B GILDA drains. Pt CGA to SBA for sit to stand from chair, demonstrated good static standing balance while donning robe.  Pt ambulated into hallway for 250' without assistive device, PT pushing IV pole, SBA to [address] for pt. Pt with very slow melanie during ambulation, additional time required. Pt overall doing well and making progress toward goals. Anticipate HHPT or no needs at discharge pending progress. This section established at most recent assessment   PROBLEM LIST (Impairments causing functional limitations):  1. Decreased Strength  2. Decreased Transfer Abilities  3. Decreased Ambulation Ability/Technique  4. Decreased Balance  5. Increased Pain  6. Decreased Activity Tolerance   INTERVENTIONS PLANNED: (Benefits and precautions of physical therapy have been discussed with the patient.)  1. Balance Exercise  2. Bed Mobility  3. Family Education  4. Gait Training  5. Home Exercise Program (HEP)  6. Neuromuscular Re-education/Strengthening  7. Therapeutic Activites  8. Therapeutic Exercise/Strengthening  9. Transfer Training     TREATMENT PLAN: Frequency/Duration: 3 times a week for duration of hospital stay  Rehabilitation Potential For Stated Goals: Good     REHAB RECOMMENDATIONS (at time of discharge pending progress):    Placement: It is my opinion, based on this patient's performance to date, that Mr. Jayleen Harkins may benefit from [address] after discharge due to the functional deficits listed above that are likely to improve with skilled rehabilitation because he/she has multiple medical issues that affect his/her functional mobility in the community. Equipment:    None at this time              HISTORY:   History of Present Injury/Illness (Reason for Referral):  Blood loss  Past Medical History/Comorbidities:   Mr. Jayleen Harkins  has a past medical history of Cancer ([facility] [address] GERD (gastroesophageal reflux disease), and Hernia. Mr. Jayleen Harkins  has a past surgical history that includes hx hernia repair (2007); hx orthopaedic (Right, 1982); hx cataract removal (Left, 12/15/2017); ir occl txcath hemmorage w si (9/3/2020); and ir insert tunl cvc w port over 5 years (9/4/2020).   Social History/Living Environment:   Home Environment: Private residence  # Steps to Enter: 2  Rails to Enter: No  One/Two Story Residence: Two story, live on 1st floor  Living Alone: No  Support Systems: Spouse/Significant Other/Partner  Patient Expects to be Discharged to[de-identified] Private residence  Current DME Used/Available at Home: None  Tub or Shower Type: Shower  Prior Level of Function/Work/Activity:  Lives with spouse and PTA independent with ambulation. Number of Personal Factors/Comorbidities that affect the Plan of Care: 1-2: MODERATE COMPLEXITY   EXAMINATION:   Most Recent Physical Functioning:   Gross Assessment:                  Posture:  Posture (WDL): Within defined limits  Balance:  Sitting: Intact  Standing: Impaired  Standing - Static: Good;Fair  Standing - Dynamic : Fair(fair +) Bed Mobility:  Supine to Sit: (up in chair)  Sit to Supine: (left up in chair)  Wheelchair Mobility:     Transfers:  Sit to Stand: Contact guard assistance  Stand to Sit: Stand-by assistance  Gait:     Base of Support: Narrowed  Speed/Margo: Slow  Step Length: Left shortened;Right shortened  Gait Abnormalities: Decreased step clearance  Distance (ft): 250 Feet (ft)  Assistive Device: (none)  Ambulation - Level of Assistance: Stand-by assistance;Contact guard assistance      Body Structures Involved:  1. Lungs  2. Digestive Structures  3. Metabolic  4. Muscles Body Functions Affected:  1. Sensory/Pain  2. Respiratory  3. Neuromusculoskeletal  4. Movement Related  5. Digestive  6. Metobolic/Endocrine Activities and Participation Affected:  1. General Tasks and Demands  2. Mobility  3. Self Care  4. Domestic Life  5.  Community, Social and [place]   Number of elements that affect the Plan of Care: 4+: HIGH COMPLEXITY   CLINICAL PRESENTATION:   Presentation: Stable and uncomplicated: LOW COMPLEXITY   CLINICAL DECISION MAKIN [facility] Short Form  How much difficulty does the patient currently have. .. Unable A Lot A Little None   1. Turning over in bed (including adjusting bedclothes, sheets and blankets)? [] 1   [] 2   [] 3   [x] 4   2. Sitting down on and standing up from a chair with arms ( e.g., wheelchair, bedside commode, etc.)   [] 1   [] 2   [x] 3   [] 4   3. Moving from lying on back to sitting on the side of the bed? [] 1   [] 2   [x] 3   [] 4   How much help from another person does the patient currently need. .. Total A Lot A Little None   4. Moving to and from a bed to a chair (including a wheelchair)? [] 1   [] 2   [x] 3   [] 4   5. Need to walk in hospital room? [] 1   [] 2   [x] 3   [] 4   6. Climbing 3-5 steps with a railing? [] 1   [] 2   [x] 3   [] 4   © 2007, Trustees of [address] [place] [address] [address] [address], under license to [employer]. All rights reserved      Score:  Initial: 19 Most Recent: X (Date: -- )    Interpretation of Tool:  Represents activities that are increasingly more difficult (i.e. Bed mobility, Transfers, Gait). Medical Necessity:     · Patient demonstrates   · good  ·  rehab potential due to higher previous functional level. Reason for Services/Other Comments:  · Patient continues to require skilled intervention due to   · Decreased balance and activity tolerance  · . Use of outcome tool(s) and clinical judgement create a POC that gives a: Clear prediction of patient's progress: LOW COMPLEXITY            TREATMENT:   (In addition to Assessment/Re-Assessment sessions the following treatments were rendered)   Pre-treatment Symptoms/Complaints:  No complaints  Pain: Initial:   Pain Intensity 1: 0  Post Session:  0/10     Therapeutic Activity: (    23 minutes): Therapeutic activities including Chair transfers, STS transfers, Ambulation on level ground, posture, breathing technique to improve mobility, strength, balance, and activity tolerance . Required minimal   to promote static and dynamic balance in standing.         Date:  9/10/20 Date:   Date: ACTIVITY/EXERCISE AM PM AM PM AM PM   Seated LAQ 1 x 20 B        Seated marching 1 x 20 B        Seated AP 1 x 20 B        Seated hip abd/add 1 x 20 R  1 x 18 L                                   B = bilateral; AA = active assistive; A = active; P = passive        Braces/Orthotics/Lines/Etc:   · IV  · drain GILDA x 2  Treatment/Session Assessment:    · Response to Treatment:  Tolerated well, improved overall mobility. · Interdisciplinary Collaboration:   o Physical Therapist  o Registered Nurse  · After treatment position/precautions:   o Up in chair  o Bed/Chair-wheels locked  o Bed in low position  o Call light within reach  o RN notified  o Family at bedside   · Compliance with Program/Exercises: Will assess as treatment progresses  · Recommendations/Intent for next treatment session: \"Next visit will focus on advancements to more challenging activities and reduction in assistance provided\".   Total Treatment Duration:  PT Patient Time In/Time Out  Time In: 1927  Time Out: [address], PT

## 2020-09-13 NOTE — PROGRESS NOTES
PLAN:  NG out 9/12/20  Clear Liquids  IVFs  Zosyn  SCD/IS/Protonix  for prophylactic measures  Ok for OOB to chair  Pain/nausea control  Monitor labs  Monitor drain output  Drain care  Ok to leave incision GELACIO  Soriano out 9/12/20  TPN  Follow Lytes    ASSESSMENT:  Admit Date: 9/7/2020   6 Days Post-Op  Procedure(s):  LAPAROTOMY EXPLORATORY /  CHOLECYSTECTOMY    Principal Problem:    Acute blood loss anemia (9/2/2020)    Active Problems:    Essential hypertension (5/8/2018)      Mixed hyperlipidemia (5/8/2018)      Dietz's esophagus without dysplasia (8/12/2020)      Malignant neoplasm of head of pancreas ([facility] [address] (8/31/2020)      Iron deficiency anemia (9/1/2020)      Pancreatic cancer ([address] (9/2/2020)      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 Soriano -380mL UOP/24h. NGT with 100mL out. Left GILDA with 90mL serosanguinous otuput;  right GILDA with 170mL bilious output (drains around duodenal stump). WBC 14, H/H 9.7/28. Cr 1.87. BS 200s. On Fentanyl and Octreotide gtts. LFTs pending today. 9/9/2020  POD#2  Extubated yesterday. Alert in bed w/o complaints. Oxygenating well on 2L NC; congested. NGT with 750mL out; dark/bilious. Left GILDA with 45mL serosanguinous otuput;  right GILDA with 110mL serous/bile tinged output (drains around duodenal stump). WBC 13.9, H/H 8.2/25. Cr 1.1. Tbili 2.8.     9/9/2020  POD#3  Transferred to floor yesterday. Alert in bed w/o complaints. Pain controlled. Oxygenating well on 2L NC; congested. NGT with 750mL out; dark/bilious. Left GILDA with 50mL serosanguinous otuput;  right GILDA with 45mL serous output. WBC 14.8, H/H 8.1/25. 6. Tbili up to 5.1. Denies flatus. 9/11/2020  POD#4  Alert in chair w/o complaints. Pain controlled.  Oxygenating well on 2L NC; NGT with 600mL out; dark/bilious. Left GILDA with 55mL serosanguinous otuput;  right GILDA with 35mL serous output. Also, some drainage from around Right tube. WBC 14.3, H/H 8.1/25. 6. Tbili 4.5. +BMx2- bloody. 9/12/2020  POD#5 Alert in bed w/o complaints. Pain controlled. Oxygenating well on 2L NC; NGT with 375mL out; dark/bilious. Left GILDA with 100mL serosanguinous otuput;  right GILDA with 55mL serous output. Also, some drainage from around Right tube. WBC 15.9, H/H 8.5/26. 3. Tbili 2.9. +BMx2- bloody. 9/13/2020  POD#6 Alert and sitting in recliner. Pain controlled. On clears; denies nausea or vomiting, however, reports burping. +flatus/+BM. Oxygenating well on 2L NC;  Left GILDA with 105mL serosanguinous output;  right GILDA with 70mL serous output. WBC 16.6, H/H 8.6/27.7. Tbili 2.1. Phos 2.2, K+3.0. Intake/Output Summary (Last 24 hours) at 9/13/2020 1012  Last data filed at 9/13/2020 0707  Gross per 24 hour   Intake 3409 ml   Output 4175 ml   Net -766 ml     OBJECTIVE:  Constitutional: Alert oriented cooperative patient in no acute distress; appears stated age   Visit Vitals  /63   Pulse 63   Temp 98 °F (36.7 °C)   Resp 18   Ht 5' 7\" (1.702 m)   Wt 180 lb 1.6 oz (81.7 kg)   SpO2 97%   BMI 28.21 kg/m²     Eyes: Sclera are clear. ENMT: no external lesions gross hearing normal; no obvious neck masses, no ear or lip lesions  CV: RRR. Normal perfusion  Resp: No JVD. Breathing is  non-labored; no audible wheezing. GI: soft and non-distended, staples c/d/i with staples, GILDA bilat  Musculoskeletal: unremarkable with normal function. No embolic signs or cyanosis.    Neuro:  Oriented; moves all 4; no focal deficits  Psychiatric: normal affect and mood, no memory impairment      Patient Vitals for the past 24 hrs:   BP Temp Pulse Resp SpO2 Weight   09/13/20 0801     97 %    09/13/20 0707 126/63 98 °F (36.7 °C) 63 18 96 %    09/13/20 0642      180 lb 1.6 oz (81.7 kg)   09/13/20 0350 (!) 144/62 97.9 °F (36.6 °C) 60 18 98 %    09/12/20 2238 124/66 98 °F (36.7 °C) 66 18 98 %    09/12/20 2000     97 %    09/12/20 1928 (!) 154/62 98.3 °F (36.8 °C) 76 18 98 %    09/12/20 1619 (!) 147/68 98.3 °F (36.8 °C) 66 19 97 %    09/12/20 1140     96 %      Labs:    Recent Labs     09/13/20  0425   WBC 16.6*   HGB 8.6*         K 3.0*      CO2 28   BUN 10   CREA 0.78*   *   TBILI 2.1*   ALT 50   *       Signed:  Shereen Pfeiffer NP

## 2020-09-13 NOTE — PROGRESS NOTES
Problem: Falls - Risk of  Goal: *Absence of Falls  Description: Document Antony Rabago Fall Risk and appropriate interventions in the flowsheet. Outcome: Progressing Towards Goal  Note: Fall Risk Interventions:  Mobility Interventions: Communicate number of staff needed for ambulation/transfer, Patient to call before getting OOB         Medication Interventions: Patient to call before getting OOB, Teach patient to arise slowly    Elimination Interventions: Call light in reach, Patient to call for help with toileting needs, Toileting schedule/hourly rounds, Urinal in reach              Problem: Patient Education: Go to Patient Education Activity  Goal: Patient/Family Education  Outcome: Progressing Towards Goal     Problem: Non-Violent Restraints  Goal: *Patient Specific Goal (EDIT GOAL, INSERT TEXT)  Outcome: Progressing Towards Goal     Problem: Pressure Injury - Risk of  Goal: *Prevention of pressure injury  Description: Document Amadeo Scale and appropriate interventions in the flowsheet. Outcome: Progressing Towards Goal  Note: Pressure Injury Interventions:  Sensory Interventions: Assess changes in LOC, Assess need for specialty bed, Avoid rigorous massage over bony prominences, Check visual cues for pain, Discuss PT/OT consult with provider, Float heels, Maintain/enhance activity level, Minimize linen layers, Monitor skin under medical devices, Pad between skin to skin, Pressure redistribution bed/mattress (bed type), Turn and reposition approx.  every two hours (pillows and wedges if needed)         Activity Interventions: Increase time out of bed, Pressure redistribution bed/mattress(bed type)    Mobility Interventions: Pressure redistribution bed/mattress (bed type), PT/OT evaluation    Nutrition Interventions: Document food/fluid/supplement intake, Offer support with meals,snacks and hydration    Friction and Shear Interventions: Apply protective barrier, creams and emollients, Foam dressings/transparent film/skin sealants                Problem: Patient Education: Go to Patient Education Activity  Goal: Patient/Family Education  Outcome: Progressing Towards Goal     Problem: Patient Education: Go to Patient Education Activity  Goal: Patient/Family Education  Outcome: Progressing Towards Goal     Problem: Patient Education: Go to Patient Education Activity  Goal: Patient/Family Education  Outcome: Progressing Towards Goal

## 2020-09-13 NOTE — PROGRESS NOTES
Nutrition Note  This assessment was completed remotely   TPN Management- Chart reviewed  9-12:NGT dc'd and started on clear liquid diet. Expected decreased K losses with NG tube out. 9-13: Remains on clear liquids with Ensure clear tid. Even with supplementation the diet is nutritionally inadequate. K 3.0, Phos 2.2-received 20 mmole Kphos yesterday. K increased in TPN from ~56 meq/d to ~94 meq/d. Phos increased in TPN from ~56 meq/d to ~66 meq/d. May benefit from increased K in TPN  POC glucoses: 140-184 mg/dl. Received 8 units SSI yesterday and 6 units thus far today. Sandostatin likely impacting glucoses. Intervention:  Supplement KPhos per protocols.   TPN: Increase to 30 meq KCl/L to provide a total of ~122 meq K/d     Electronically signed by Jeana Carvajal RD on 9/13/2020 at 1:34 PM    Contact: Vinay Lieberman, [address], [facility] [address]

## 2020-09-14 ENCOUNTER — APPOINTMENT (OUTPATIENT)
Dept: GENERAL RADIOLOGY | Age: 65
DRG: 326 | End: 2020-09-14
Attending: INTERNAL MEDICINE
Payer: COMMERCIAL

## 2020-09-14 LAB
ALBUMIN SERPL-MCNC: 1.7 G/DL (ref 3.2–4.6)
ALBUMIN/GLOB SERPL: 0.5 {RATIO} (ref 1.2–3.5)
ALP SERPL-CCNC: 209 U/L (ref 50–136)
ALT SERPL-CCNC: 49 U/L (ref 12–65)
ANION GAP SERPL CALC-SCNC: 5 MMOL/L (ref 7–16)
AST SERPL-CCNC: 23 U/L (ref 15–37)
BILIRUB SERPL-MCNC: 1.9 MG/DL (ref 0.2–1.1)
BUN SERPL-MCNC: 10 MG/DL (ref 8–23)
CALCIUM SERPL-MCNC: 7.8 MG/DL (ref 8.3–10.4)
CHLORIDE SERPL-SCNC: 103 MMOL/L (ref 98–107)
CO2 SERPL-SCNC: 28 MMOL/L (ref 21–32)
CREAT SERPL-MCNC: 0.8 MG/DL (ref 0.8–1.5)
ERYTHROCYTE [DISTWIDTH] IN BLOOD BY AUTOMATED COUNT: 16.1 % (ref 11.9–14.6)
GLOBULIN SER CALC-MCNC: 3.4 G/DL (ref 2.3–3.5)
GLUCOSE BLD STRIP.AUTO-MCNC: 146 MG/DL (ref 65–100)
GLUCOSE BLD STRIP.AUTO-MCNC: 175 MG/DL (ref 65–100)
GLUCOSE BLD STRIP.AUTO-MCNC: 182 MG/DL (ref 65–100)
GLUCOSE BLD STRIP.AUTO-MCNC: 206 MG/DL (ref 65–100)
GLUCOSE SERPL-MCNC: 149 MG/DL (ref 65–100)
HCT VFR BLD AUTO: 29.2 % (ref 41.1–50.3)
HGB BLD-MCNC: 9.1 G/DL (ref 13.6–17.2)
MAGNESIUM SERPL-MCNC: 1.9 MG/DL (ref 1.8–2.4)
MCH RBC QN AUTO: 28.8 PG (ref 26.1–32.9)
MCHC RBC AUTO-ENTMCNC: 31.2 G/DL (ref 31.4–35)
MCV RBC AUTO: 92.4 FL (ref 79.6–97.8)
NRBC # BLD: 0.03 K/UL (ref 0–0.2)
PHOSPHATE SERPL-MCNC: 2.4 MG/DL (ref 2.3–3.7)
PLATELET # BLD AUTO: 266 K/UL (ref 150–450)
PMV BLD AUTO: 11.4 FL (ref 9.4–12.3)
POTASSIUM SERPL-SCNC: 3.2 MMOL/L (ref 3.5–5.1)
PROT SERPL-MCNC: 5.1 G/DL (ref 6.3–8.2)
RBC # BLD AUTO: 3.16 M/UL (ref 4.23–5.6)
SODIUM SERPL-SCNC: 136 MMOL/L (ref 136–145)
WBC # BLD AUTO: 19 K/UL (ref 4.3–11.1)

## 2020-09-14 PROCEDURE — 80053 COMPREHEN METABOLIC PANEL: CPT

## 2020-09-14 PROCEDURE — 74011000250 HC RX REV CODE- 250: Performed by: SURGERY

## 2020-09-14 PROCEDURE — 84100 ASSAY OF PHOSPHORUS: CPT

## 2020-09-14 PROCEDURE — 93005 ELECTROCARDIOGRAM TRACING: CPT | Performed by: SURGERY

## 2020-09-14 PROCEDURE — 97535 SELF CARE MNGMENT TRAINING: CPT

## 2020-09-14 PROCEDURE — 94640 AIRWAY INHALATION TREATMENT: CPT

## 2020-09-14 PROCEDURE — 74011250636 HC RX REV CODE- 250/636: Performed by: INTERNAL MEDICINE

## 2020-09-14 PROCEDURE — 74011000258 HC RX REV CODE- 258: Performed by: SURGERY

## 2020-09-14 PROCEDURE — 65660000000 HC RM CCU STEPDOWN

## 2020-09-14 PROCEDURE — 74011250637 HC RX REV CODE- 250/637: Performed by: NURSE PRACTITIONER

## 2020-09-14 PROCEDURE — 74011636637 HC RX REV CODE- 636/637: Performed by: PHYSICIAN ASSISTANT

## 2020-09-14 PROCEDURE — 94760 N-INVAS EAR/PLS OXIMETRY 1: CPT

## 2020-09-14 PROCEDURE — 74011000250 HC RX REV CODE- 250: Performed by: NURSE PRACTITIONER

## 2020-09-14 PROCEDURE — 2709999900 HC NON-CHARGEABLE SUPPLY

## 2020-09-14 PROCEDURE — 74011000250 HC RX REV CODE- 250: Performed by: INTERNAL MEDICINE

## 2020-09-14 PROCEDURE — C9113 INJ PANTOPRAZOLE SODIUM, VIA: HCPCS | Performed by: INTERNAL MEDICINE

## 2020-09-14 PROCEDURE — 82962 GLUCOSE BLOOD TEST: CPT

## 2020-09-14 PROCEDURE — 97530 THERAPEUTIC ACTIVITIES: CPT

## 2020-09-14 PROCEDURE — 71045 X-RAY EXAM CHEST 1 VIEW: CPT

## 2020-09-14 PROCEDURE — 74011250636 HC RX REV CODE- 250/636: Performed by: SURGERY

## 2020-09-14 PROCEDURE — 83735 ASSAY OF MAGNESIUM: CPT

## 2020-09-14 PROCEDURE — 85027 COMPLETE CBC AUTOMATED: CPT

## 2020-09-14 RX ORDER — POTASSIUM CHLORIDE 14.9 MG/ML
20 INJECTION INTRAVENOUS
Status: COMPLETED | OUTPATIENT
Start: 2020-09-14 | End: 2020-09-14

## 2020-09-14 RX ORDER — SUCRALFATE 1 G/1
1 TABLET ORAL
Status: DISCONTINUED | OUTPATIENT
Start: 2020-09-14 | End: 2020-09-16 | Stop reason: HOSPADM

## 2020-09-14 RX ORDER — PANTOPRAZOLE SODIUM 40 MG/1
40 TABLET, DELAYED RELEASE ORAL
Status: DISCONTINUED | OUTPATIENT
Start: 2020-09-14 | End: 2020-09-16 | Stop reason: HOSPADM

## 2020-09-14 RX ADMIN — SODIUM CHLORIDE SOLN NEBU 3% 4 ML: 3 NEBU SOLN at 20:15

## 2020-09-14 RX ADMIN — SUCRALFATE 1 G: 1 TABLET ORAL at 17:09

## 2020-09-14 RX ADMIN — OXYCODONE HYDROCHLORIDE AND ACETAMINOPHEN 1 TABLET: 7.5; 325 TABLET ORAL at 12:53

## 2020-09-14 RX ADMIN — HYDROMORPHONE HYDROCHLORIDE 0.5 MG: 1 INJECTION, SOLUTION INTRAMUSCULAR; INTRAVENOUS; SUBCUTANEOUS at 04:04

## 2020-09-14 RX ADMIN — PIPERACILLIN AND TAZOBACTAM 4.5 G: 4; .5 INJECTION, POWDER, FOR SOLUTION INTRAVENOUS at 05:59

## 2020-09-14 RX ADMIN — PANTOPRAZOLE SODIUM 40 MG: 40 TABLET, DELAYED RELEASE ORAL at 17:09

## 2020-09-14 RX ADMIN — INSULIN LISPRO 2 UNITS: 100 INJECTION, SOLUTION INTRAVENOUS; SUBCUTANEOUS at 12:24

## 2020-09-14 RX ADMIN — OCTREOTIDE ACETATE 50 MCG/HR: 500 INJECTION, SOLUTION INTRAVENOUS; SUBCUTANEOUS at 06:00

## 2020-09-14 RX ADMIN — OXYCODONE HYDROCHLORIDE AND ACETAMINOPHEN 1 TABLET: 7.5; 325 TABLET ORAL at 19:32

## 2020-09-14 RX ADMIN — SODIUM CHLORIDE 40 MG: 9 INJECTION INTRAMUSCULAR; INTRAVENOUS; SUBCUTANEOUS at 08:59

## 2020-09-14 RX ADMIN — ALBUTEROL SULFATE 2.5 MG: 2.5 SOLUTION RESPIRATORY (INHALATION) at 07:21

## 2020-09-14 RX ADMIN — I.V. FAT EMULSION 250 ML: 20 EMULSION INTRAVENOUS at 18:33

## 2020-09-14 RX ADMIN — INSULIN LISPRO 4 UNITS: 100 INJECTION, SOLUTION INTRAVENOUS; SUBCUTANEOUS at 08:59

## 2020-09-14 RX ADMIN — SUCRALFATE 1 G: 1 TABLET ORAL at 23:09

## 2020-09-14 RX ADMIN — SODIUM CHLORIDE SOLN NEBU 3% 4 ML: 3 NEBU SOLN at 07:21

## 2020-09-14 RX ADMIN — POTASSIUM CHLORIDE 20 MEQ: 200 INJECTION, SOLUTION INTRAVENOUS at 12:25

## 2020-09-14 RX ADMIN — POTASSIUM CHLORIDE 20 MEQ: 200 INJECTION, SOLUTION INTRAVENOUS at 14:30

## 2020-09-14 RX ADMIN — ALBUTEROL SULFATE 2.5 MG: 2.5 SOLUTION RESPIRATORY (INHALATION) at 20:15

## 2020-09-14 RX ADMIN — OCTREOTIDE ACETATE 50 MCG/HR: 500 INJECTION, SOLUTION INTRAVENOUS; SUBCUTANEOUS at 04:04

## 2020-09-14 RX ADMIN — SUCRALFATE 1 G: 1 TABLET ORAL at 12:24

## 2020-09-14 RX ADMIN — PIPERACILLIN AND TAZOBACTAM 4.5 G: 4; .5 INJECTION, POWDER, FOR SOLUTION INTRAVENOUS at 14:30

## 2020-09-14 RX ADMIN — PIPERACILLIN AND TAZOBACTAM 4.5 G: 4; .5 INJECTION, POWDER, FOR SOLUTION INTRAVENOUS at 23:09

## 2020-09-14 RX ADMIN — INSULIN LISPRO 2 UNITS: 100 INJECTION, SOLUTION INTRAVENOUS; SUBCUTANEOUS at 18:31

## 2020-09-14 RX ADMIN — POTASSIUM CHLORIDE: 2 INJECTION, SOLUTION, CONCENTRATE INTRAVENOUS at 18:33

## 2020-09-14 RX ADMIN — HYDROMORPHONE HYDROCHLORIDE 1 MG: 1 INJECTION, SOLUTION INTRAMUSCULAR; INTRAVENOUS; SUBCUTANEOUS at 23:09

## 2020-09-14 NOTE — PROGRESS NOTES
Met with pt and spouse at bedside, pt found sitting up in bedside chair, pt and spouse requested referral to [facility] for home care, referral faxed as requested for RN, PT/OT.

## 2020-09-14 NOTE — PROGRESS NOTES
Problem: Self Care Deficits Care Plan (Adult)  Goal: *Acute Goals and Plan of Care (Insert Text)  Outcome: Progressing Towards Goal  Note: 1. Pt will toilet with SBA (MET)  2. Pt will complete functional mobility for ADLs with SBA (MET)  3. Pt will complete lower body dressing with SBA using AE as needed  4. Pt will complete grooming and hygiene at sink with SBA (MET)  5. Pt will demonstrate independence with HEP to promote increased BUE strength and functional use for ADLs  6. Pt will tolerate 23 minutes functional activity with min or fewer rest breaks to promote increased endurance for ADLs  7. Pt will complete bed mobility with SBA in prep for ADLs    Timeframe: 7 days       OCCUPATIONAL THERAPY: Daily Note 9/14/2020  INPATIENT: OT Visit Days: 2  Payor: [address] [place] [address] / Plan: [id number] / Product Type: PPO /      NAME/AGE/GENDER: Temitope Izquierdo is a 60 y.o. male   PRIMARY DIAGNOSIS:  Acute blood loss anemia [D62]  Duodenal mass [K31.89] Acute blood loss anemia Acute blood loss anemia  Procedure(s) (LRB):  LAPAROTOMY EXPLORATORY /  CHOLECYSTECTOMY (N/A)  7 Days Post-Op  ICD-10: Treatment Diagnosis:    · Generalized Muscle Weakness (M62.81)   Precautions/Allergies:     Tetanus and diphtheria toxoids      ASSESSMENT:     Mr. Dianna Fritz presents with anemia and pancreatic cancer with liver mets, s/p exploratory laparotomy and cholecystectomy. 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 This session, pt greeted seated in chair, pleasant and agreeable to OT session. Abdominal precautions and adaptive techniques to maintain during ADLs and mobility for ADLs reviewed with pt. Pt donned/ doffed socks with SBA using cross leg technique, extra time. Pt completed ADLs at sink with SBA, demonstrated adaptive technique of raising basin to mouth to avoid bending during oral cares. Pt toileted with SBA.  Pt stood and mobilized in room and bathroom, sink and toilet transfers with SBA w/o an AD. Pt endorsed mild to moderate fatigue with activity. Pt demonstrates excellent progress towards goals and has met several, continue POC. This section established at most recent assessment   PROBLEM LIST (Impairments causing functional limitations):  1. Decreased Strength  2. Decreased ADL/Functional Activities  3. Decreased Transfer Abilities  4. Decreased Balance  5. Decreased Activity Tolerance   INTERVENTIONS PLANNED: (Benefits and precautions of occupational therapy have been discussed with the patient.)  1. Activities of daily living training  2. Adaptive equipment training  3. Balance training  4. Therapeutic activity  5. Therapeutic exercise     TREATMENT PLAN: Frequency/Duration: Follow patient 3 times/ week to address above goals. Rehabilitation Potential For Stated Goals: Good     REHAB RECOMMENDATIONS (at time of discharge pending progress):    Placement: It is my opinion, based on this patient's performance to date, that Mr. Jaqueline Ortiz may benefit from [address] after discharge due to the functional deficits listed above that are likely to improve with skilled rehabilitation because he/she has multiple medical issues that affect his/her functional mobility in the community. Equipment:    3:1 BSC as shower chair              OCCUPATIONAL PROFILE AND HISTORY:   History of Present Injury/Illness (Reason for Referral):  See H&P  Past Medical History/Comorbidities:   Mr. Jaqueline Ortiz  has a past medical history of Cancer ([address] GERD (gastroesophageal reflux disease), and Hernia. Mr. Jaqueline Ortiz  has a past surgical history that includes hx hernia repair (2007); hx orthopaedic (Right, 1982); hx cataract removal (Left, 12/15/2017); ir occl txcath hemmorage w si (9/3/2020); and ir insert tunl cvc w port over 5 years (9/4/2020).   Social History/Living Environment:   Home Environment: Private residence  # Steps to Enter: 2  Rails to Enter: No  One/Two Story Residence: Two story, live on 1st floor  Living Alone: No  Support Systems: Spouse/Significant Other/Partner  Patient Expects to be Discharged to[de-identified] Private residence  Current DME Used/Available at Home: None  Tub or Shower Type: Shower  Prior Level of Function/Work/Activity:  Independent      Number of Personal Factors/Comorbidities that affect the Plan of Care: Expanded review of therapy/medical records (1-2):  MODERATE COMPLEXITY   ASSESSMENT OF OCCUPATIONAL PERFORMANCE[de-identified]   Activities of Daily Living:   Basic ADLs (From Assessment) Complex ADLs (From Assessment)   Feeding: Setup  Oral Facial Hygiene/Grooming: Contact guard assistance  Bathing: Minimum assistance  Upper Body Dressing: Setup  Lower Body Dressing: Minimum assistance  Toileting: Minimum assistance Instrumental ADL  Meal Preparation: Moderate assistance  Homemaking:  Moderate assistance   Grooming/Bathing/Dressing Activities of Daily Living   Grooming  Grooming Assistance: Supervision             Toileting  Toileting Assistance: Supervision         Lower Body Dressing Assistance  Socks: Supervision Bed/Mat Mobility  Sit to Stand: Stand-by assistance  Stand to Sit: Stand-by assistance  Bed to Chair: Stand-by assistance     Most Recent Physical Functioning:   Gross Assessment:  AROM: Within functional limits  Strength: Generally decreased, functional               Posture:  Posture (WDL): Within defined limits  Balance:  Sitting: Intact  Standing: Impaired  Standing - Static: Good  Standing - Dynamic : Fair(+) Bed Mobility:     Wheelchair Mobility:     Transfers:  Sit to Stand: Stand-by assistance  Stand to Sit: Stand-by assistance  Bed to Chair: Stand-by assistance            Patient Vitals for the past 6 hrs:   BP SpO2 Pulse   09/14/20 1200 (!) 153/68 98 % 69   09/14/20 1435 (!) 162/61 98 % (!) 46       Mental Status  Neurologic State: Alert  Orientation Level: Oriented X4  Cognition: Follows commands  Perception: Appears intact  Perseveration: No perseveration noted  Safety/Judgement: Awareness of environment                          Physical Skills Involved:  1. Balance  2. Strength  3. Activity Tolerance Cognitive Skills Affected (resulting in the inability to perform in a timely and safe manner): 1. none Psychosocial Skills Affected:  1. Habits/Routines  2. Environmental Adaptation   Number of elements that affect the Plan of Care: 3-5:  MODERATE COMPLEXITY   CLINICAL DECISION MAKIN[address] [place] [address] AM-PAC 6 Clicks   Daily Activity Inpatient Short Form  How much help from another person does the patient currently need. .. Total A Lot A Little None   1. Putting on and taking off regular lower body clothing? [] 1   [] 2   [x] 3   [] 4   2. Bathing (including washing, rinsing, drying)? [] 1   [] 2   [x] 3   [] 4   3. Toileting, which includes using toilet, bedpan or urinal?   [] 1   [] 2   [x] 3   [] 4   4. Putting on and taking off regular upper body clothing? [] 1   [] 2   [] 3   [x] 4   5. Taking care of personal grooming such as brushing teeth? [] 1   [] 2   [] 3   [x] 4   6. Eating meals? [] 1   [] 2   [] 3   [x] 4   © , Trustees of [address] [place] [address], under license to [employer]. All rights reserved      Score:  Initial: 21 Most Recent: X (Date: -- )    Interpretation of Tool:  Represents activities that are increasingly more difficult (i.e. Bed mobility, Transfers, Gait). Medical Necessity:     · Patient demonstrates   · good  ·  rehab potential due to higher previous functional level. Reason for Services/Other Comments:  · Patient   · continues to require present interventions due to patient's inability to independently complete ADLs  · .    Use of outcome tool(s) and clinical judgement create a POC that gives a: MODERATE COMPLEXITY         TREATMENT:   (In addition to Assessment/Re-Assessment sessions the following treatments were rendered)     Pre-treatment Symptoms/Complaints:    Pain: Initial:   Pain Intensity 1: 0 Post Session:  0     Self Care: (23 min): Procedure(s) (per grid) utilized to improve and/or restore self-care/home management as related to dressing, toileting and grooming. Required minimal   cueing to facilitate activities of daily living skills and compensatory activities. Braces/Orthotics/Lines/Etc:   · IV  · drain 2 GILDA  · nasogastric tube  · O2 Device: Nasal cannula  Treatment/Session Assessment:    · Response to Treatment:  no adverse reaction   · Interdisciplinary Collaboration:   o Occupational Therapist  o Registered Nurse  · After treatment position/precautions:   o Up in chair  o Bed/Chair-wheels locked  o Call light within reach  o RN notified   · Compliance with Program/Exercises: Compliant all of the time, Will assess as treatment progresses. · Recommendations/Intent for next treatment session: \"Next visit will focus on advancements to more challenging activities and reduction in assistance provided\".   Total Treatment Duration:  OT Patient Time In/Time Out  Time In: 1410  Time Out: [address] [facility] Galina Kidd

## 2020-09-14 NOTE — PROGRESS NOTES
END OF SHIFT NOTE:    INTAKE/OUTPUT  09/13 0701 - 09/14 0700  In: 6697 [I.V.:3708]  Out: 6851 [Urine:4300; Drains:155]  Voiding: YES  Catheter: NO  Drain:   Richard-Garza Drain 09/07/20 Anterior;Right Abdomen (Active)   Site Assessment Clean, dry, & intact 09/14/20 0417   Dressing Status Clean, dry, & intact 09/14/20 0417   Status Patent; Charged;Draining 09/14/20 0417   Drainage Color Serosanguinous 09/14/20 0417   Output (ml) 20 ml 09/14/20 0417       Richard-Garza Drain 09/07/20 Anterior; Left Abdomen (Active)   Site Assessment Clean, dry, & intact 09/14/20 0417   Dressing Status Clean, dry, & intact 09/14/20 0417   Status Patent; Charged;Draining 09/14/20 0417   Drainage Color Serous 09/14/20 0417   Output (ml) 20 ml 09/14/20 0417               Flatus: Patient does have flatus present. Stool:  1 occurrences. Characteristics:  Stool Assessment  Stool Color: [place]  Stool Appearance: Loose, Watery  Stool Amount: Large  Stool Source/Status: Rectum    Emesis: 0 occurrences. Characteristics:        VITAL SIGNS  Patient Vitals for the past 12 hrs:   Temp Pulse Resp BP SpO2   09/14/20 0723     97 %   09/14/20 0339 99.4 °F (37.4 °C) 77 18 138/82 97 %   09/13/20 2216 98.1 °F (36.7 °C) 75 16 (!) 149/79 97 %   09/13/20 2030 98.1 °F (36.7 °C) 95 18 136/72 97 %   09/13/20 2006     97 %       Pain Assessment  Pain Intensity 1: 7 (09/13/20 2151)  Pain Location 1: Incisional, Abdomen  Pain Intervention(s) 1: Medication (see MAR)  Patient Stated Pain Goal: 0    Ambulating  Yes    Shift report given to oncoming nurse at the bedside.     Mariam Bain, RN

## 2020-09-14 NOTE — PROGRESS NOTES
Chart review complete, CM will remain available for dc needs, PT/OT recommending home with home care, CM will follow up with pt and family about [place] choice closer to dc date.

## 2020-09-14 NOTE — PROGRESS NOTES
END OF SHIFT NOTE:    INTAKE/OUTPUT  09/13 0701 - 09/14 0700  In: 0004 [I.V.:3708]  Out: 9406 [Urine:4300; Drains:155]  Voiding: YES  Catheter: NO  Drain:   Richard-Garza Drain 09/07/20 Anterior;Right Abdomen (Active)   Site Assessment Drainage (comment) 09/14/20 1242   Dressing Status New 09/14/20 1242   Status Patent; Charged;Draining 09/14/20 1242   Drainage Color Serosanguinous 09/14/20 1841   Output (ml) 20 ml 09/14/20 1841               Flatus: Patient does have flatus present. Stool:  3 occurrences. Characteristics:  Stool Assessment  Stool Color: [place]  Stool Appearance: Loose, Watery  Stool Amount: Medium  Stool Source/Status: Rectum    Emesis: 0 occurrences. Characteristics:        VITAL SIGNS  Patient Vitals for the past 12 hrs:   Temp Pulse Resp BP SpO2   09/14/20 1435 98.3 °F (36.8 °C) (!) 46 19 (!) 162/61 98 %   09/14/20 1200 98.5 °F (36.9 °C) 69 30 (!) 153/68 98 %   09/14/20 0803 97.9 °F (36.6 °C) 70 30 137/78 97 %       Pain Assessment  Pain Intensity 1: 0 (09/14/20 1500)  Pain Location 1: Abdomen  Pain Intervention(s) 1: Medication (see MAR)  Patient Stated Pain Goal: 0    Ambulating  Yes    Shift report given to oncoming nurse at the bedside.     Bridgette Arevalo RN

## 2020-09-14 NOTE — PROGRESS NOTES
Palliative care services are no longer needed at this time. Thank you for the opportunity to participate in this patients care. Please consult again if further needs arise.

## 2020-09-14 NOTE — PROGRESS NOTES
Comprehensive Nutrition Assessment    Type and Reason for Visit: Reassess(TPN management (General Surgery))    Nutrition Recommendations/Plan:   1) Decrease TPN to 1L, continue daily lipids. Regimen to provide: 1210 kcal (~68% needs), 50 g pro (~59% needs), 150 g CHO (does not exceed mmax CHO), ~1.25 L total volume (~69% needs). Additive per L: 40 meq NaAcetate, 35 meq KPhos, 30 meq KCl, 4.5 meq Ca, 5 meq Mg  Additives per day: MVI  2) Replace K per protocol  3) Continue to advance diet as appropriate. Ensure Enlive provided on full liquid diet. Will change Ensure Clear to Ensure Enlive to continue once diet advanced past full liquids. Nutrition Assessment:  Patient with PMH of GERD and recent dx of pancreatic cancer metastatic to liver. He is s/p EUS 8/25. Admitted from 9/2-9/6 due to acute blood loss s/p mesnteric arteriogram and emboization 9/3. He presented with dark red blood in yesi. Rapid called due to high volume blood from rectum. He is now s/p emergent exp lap with resection of distal stomach and proximal duedenum, cholecystectomy, and gastrojejunostomy 9/7. Patient seen in follow-up. He states he is doing well. He states he has been tolerating clear liquids without issue. He has been drinking Ensure Clear but does not like the berry flavor. He states that he had a BM yesterday, but none so far today.   Medications: SSI (12 units yesterday, 4 thus far today), Protonix, Zosyn, Sandostatin, Carafate  Abdomen: hypoactive BS, last BM 9/12- loose/watery  NGT removed 9/12  Labs significant for: Na 136, K+ 3.2, Phos 2.4, Mg 1.9, AM glucose 149, POC glucoses 167-215 over last 24 hrs    Estimated Daily Nutrient Needs:  Energy (kcal):  4006-2061(85-87 kcal/kg (recent body weight 70.9 kg))  Protein (g):  (1.2-1.5 g/kg (recent body weight 70.9 kg))   Max CHO: 387 g (4mg/kgIBW/min/d)        Fluid (ml/day):  7630-2559(~1 ml/kcal)    Current Nutrition Therapies:   DIET NUTRITIONAL SUPPLEMENTS Breakfast, Lunch, Dinner; Ensure Clear  DIET FULL LIQUID  Current Parenteral Nutrition Orders:  · Type and Formula: 15%DEX/5%AA   · Lipids: 250ml, Daily  · Duration: Continuous  · Rate/Volume: 1.8L  · Current PN Order Provides: 1778 kcal (100% needs), 90 g pro (100% needs), 270 g CHO (does not exceed max CHO), ~2L total volume    Anthropometric Measures:  · Height:  5' 7\" (170.2 cm)  · Current Body Wt:  81.7 kg (180 lb 1.9 oz)(bed weight)   · Body mass index is 28.21 kg/m². · BMI Category: Overweight (BMI 25.0-29. 9)       Nutrition Diagnosis:   · Inadequate oral intake related to altered GI function as evidenced by (surgery as above, NPO/CLD since admission, TPN)      Nutrition Interventions:   Food and/or Nutrient Delivery: Modify oral nutrition supplement(Continue to advance diet as appropriate)  Coordination of Nutrition Care: (Discussed with Mina Haider RN and Florencio Howe NP)    Goals:  Meet at least 75% nutrition needs witiin 7 days       Nutrition Monitoring and Evaluation:   Food/Nutrient Intake Outcomes: Diet advancement/tolerance, Food and nutrient intake, Parenteral nutrition intake/tolerance  Physical Signs/Symptoms Outcomes: Biochemical data, GI status    Discharge Planning:     Too soon to determine     Sin Wallace [place], LD on 9/14/2020 at 11:20 AM  Contact: [phone]

## 2020-09-14 NOTE — PROGRESS NOTES
PLAN:  NG out 9/12/20  Full liquids  IVFs  Zosyn  SCD/IS/Protonix  for prophylactic measures  Ok for OOB to chair  Pain/nausea control  Monitor labs  DC left drain  DC Octreotide   Drain care  Ok to leave incision GELACIO  Soriano out 9/12/20  Wean TPN  Follow Lytes    ASSESSMENT:  Admit Date: 9/7/2020   7 Days Post-Op  Procedure(s):  LAPAROTOMY EXPLORATORY /  CHOLECYSTECTOMY    Principal Problem:    Acute blood loss anemia (9/2/2020)    Active Problems:    Essential hypertension (5/8/2018)      Mixed hyperlipidemia (5/8/2018)      Dietz's esophagus without dysplasia (8/12/2020)      Malignant neoplasm of head of pancreas ([facility] [address] (8/31/2020)      Iron deficiency anemia (9/1/2020)      Pancreatic cancer ([facility] (9/2/2020)      Duodenal mass (9/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 Soriano -380mL UOP/24h. NGT with 100mL out. Left GILDA with 90mL serosanguinous otuput;  right GILDA with 170mL bilious output (drains around duodenal stump). WBC 14, H/H 9.7/28. Cr 1.87. BS 200s. On Fentanyl and Octreotide gtts. LFTs pending today. 9/9/2020  POD#2  Extubated yesterday. Alert in bed w/o complaints. Oxygenating well on 2L NC; congested. NGT with 750mL out; dark/bilious. Left GILDA with 45mL serosanguinous otuput;  right GILDA with 110mL serous/bile tinged output (drains around duodenal stump). WBC 13.9, H/H 8.2/25. Cr 1.1. Tbili 2.8.     9/9/2020  POD#3  Transferred to floor yesterday. Alert in bed w/o complaints. Pain controlled. Oxygenating well on 2L NC; congested. NGT with 750mL out; dark/bilious. Left GILDA with 50mL serosanguinous otuput;  right GILDA with 45mL serous output. WBC 14.8, H/H 8.1/25. 6. Tbili up to 5.1. Denies flatus. 9/11/2020  POD#4  Alert in chair w/o complaints. Pain controlled. Oxygenating well on 2L NC; NGT with 600mL out; dark/bilious. Left GILDA with 55mL serosanguinous otuput;  right GILDA with 35mL serous output. Also, some drainage from around Right tube. WBC 14.3, H/H 8.1/25. 6. Tbili 4.5. +BMx2- bloody. 9/12/2020  POD#5 Alert in bed w/o complaints. Pain controlled. Oxygenating well on 2L NC; NGT with 375mL out; dark/bilious. Left GILDA with 100mL serosanguinous otuput;  right GILDA with 55mL serous output. Also, some drainage from around Right tube. WBC 15.9, H/H 8.5/26. 3. Tbili 2.9. +BMx2- bloody. 9/13/2020  POD#6 Alert and sitting in recliner. Pain controlled. On clears; denies nausea or vomiting, however, reports burping. +flatus/+BM. Oxygenating well on 2L NC;  Left GILDA with 105mL serosanguinous output;  right GILDA with 70mL serous output. WBC 16.6, H/H 8.6/27.7. Tbili 2.1. Phos 2.2, K+3.0.    9/14/2020  POD#7 Alert and sitting in recliner. Pain controlled. On clears. +flatus/+BM. Oxygenating well on 2L NC;  Left GILDA with 80mL serosanguinous output;  right GILDA with 75mL serous output. WBC 19, H/H 9.1/29.2. Tbili 1.9. Phos 2.4, K+3.2. Intake/Output Summary (Last 24 hours) at 9/14/2020 1043  Last data filed at 9/14/2020 1027  Gross per 24 hour   Intake 3708 ml   Output 5440 ml   Net -1732 ml     OBJECTIVE:  Constitutional: Alert oriented cooperative patient in no acute distress; appears stated age   Visit Vitals  /78   Pulse 70   Temp 97.9 °F (36.6 °C)   Resp 30   Ht 5' 7\" (1.702 m)   Wt 180 lb 1.6 oz (81.7 kg)   SpO2 97%   BMI 28.21 kg/m²     Eyes: Sclera are clear. ENMT: no external lesions gross hearing normal; no obvious neck masses, no ear or lip lesions  CV: RRR. Normal perfusion  Resp: No JVD. Breathing is  non-labored; no audible wheezing. GI: soft and non-distended, staples c/d/i with staples, GILDA bilat  Musculoskeletal: unremarkable with normal function. No embolic signs or cyanosis.    Neuro:  Oriented; moves all 4; no focal deficits  Psychiatric: normal affect and mood, no memory impairment      Patient Vitals for the past 24 hrs:   BP Temp Pulse Resp SpO2   09/14/20 0803 137/78 97.9 °F (36.6 °C) 70 30 97 %   09/14/20 0723     97 %   09/14/20 0339 138/82 99.4 °F (37.4 °C) 77 18 97 %   09/13/20 2216 (!) 149/79 98.1 °F (36.7 °C) 75 16 97 %   09/13/20 2030 136/72 98.1 °F (36.7 °C) 95 18 97 %   09/13/20 2006     97 %   09/13/20 1656 130/68 97.7 °F (36.5 °C) 66 18 98 %   09/13/20 1147 (!) 154/73 98.1 °F (36.7 °C) (!) 57 18 98 %     Labs:    Recent Labs     09/14/20  0409   WBC 19.0*   HGB 9.1*         K 3.2*      CO2 28   BUN 10   CREA 0.80   *   TBILI 1.9*   ALT 49   *       Signed:  Luis Teague, NP

## 2020-09-14 NOTE — PROGRESS NOTES
Problem: Mobility Impaired (Adult and Pediatric)  Goal: *Acute Goals and Plan of Care (Insert Text)  Outcome: Progressing Towards Goal  Note: LTG:  (1.)Mr. Chris Andrew will move from supine to sit and sit to supine and roll side to side, using log roll technique, with MODIFIED INDEPENDENCE within 7 treatment day(s). (2.)Mr. hCris Andrew will transfer from bed to chair and chair to bed with MODIFIED INDEPENDENCE using the least restrictive device within 7 treatment day(s). (3.)Mr. Chris Andrew will ambulate with MODIFIED INDEPENDENCE for 500 feet with the least restrictive device within 7 treatment day(s). (4.)Mr. Chris Andrew will participate in therapeutic activity/exercises x 25 minutes for increased strength within 7 treatment days. (5.)Mr. Chris Andrew will ascend and descend 2 stairs using 0 hand rail(s) with SUPERVISION to improve functional mobility and safety within 7 treatment day(s). ________________________________________________________________________________________________       PHYSICAL THERAPY: Daily Note and AM 9/14/2020  INPATIENT: PT Visit Days : 3  Payor: [address] [place] [address] / Plan: [address] / Product Type: PPO /       NAME/AGE/GENDER: Alfredo St is a 60 y.o. male   PRIMARY DIAGNOSIS: Acute blood loss anemia [D62]  Duodenal mass [K31.89] Acute blood loss anemia Acute blood loss anemia  Procedure(s) (LRB):  LAPAROTOMY EXPLORATORY /  CHOLECYSTECTOMY (N/A)  7 Days Post-Op  ICD-10: Treatment Diagnosis:    · Generalized Muscle Weakness (M62.81)  · Difficulty in walking, Not elsewhere classified (R26.2)   Precaution/Allergies:  Tetanus and diphtheria toxoids      ASSESSMENT:     Mr. Chris Andrew was sitting up in recliner chair upon contact and agreeable to PT. Patient transfers to standing with SBA and ambulates into bathroom where he performs toilet transfer with supervision. Patient demonstrates good static standing balance and fair dynamic standing balance during standing ADL activity. Patient then ambulates 500' in hallway without use of assistive device, SBA and no LOB noted. Patient ambulates at a slow, steady gait pattern. Patient returns to recliner chair where he takes a seated rest break then performs several sit to stand transfers with SBA-supervision. Overall good progress towards physical therapy goals. Goals listed above are still appropriate. Will continue efforts as patient is still below functional baseline. This section established at most recent assessment   PROBLEM LIST (Impairments causing functional limitations):  1. Decreased Strength  2. Decreased Transfer Abilities  3. Decreased Ambulation Ability/Technique  4. Decreased Balance  5. Increased Pain  6. Decreased Activity Tolerance   INTERVENTIONS PLANNED: (Benefits and precautions of physical therapy have been discussed with the patient.)  1. Balance Exercise  2. Bed Mobility  3. Family Education  4. Gait Training  5. Home Exercise Program (HEP)  6. Neuromuscular Re-education/Strengthening  7. Therapeutic Activites  8. Therapeutic Exercise/Strengthening  9. Transfer Training     TREATMENT PLAN: Frequency/Duration: 3 times a week for duration of hospital stay  Rehabilitation Potential For Stated Goals: Good     REHAB RECOMMENDATIONS (at time of discharge pending progress):    Placement: It is my opinion, based on this patient's performance to date, that Mr. Ambar Johnston may benefit from [address] after discharge due to the functional deficits listed above that are likely to improve with skilled rehabilitation because he/she has multiple medical issues that affect his/her functional mobility in the community. Equipment:    None at this time              HISTORY:   History of Present Injury/Illness (Reason for Referral):  Blood loss  Past Medical History/Comorbidities:   Mr. Ambar Johnston  has a past medical history of Cancer ([address] GERD (gastroesophageal reflux disease), and Hernia.   Mr. Ambar Johnston  has a past surgical history that includes hx hernia repair (2007); hx orthopaedic (Right, 1982); hx cataract removal (Left, 12/15/2017); ir occl txcath hemmorage w si (9/3/2020); and ir insert tunl cvc w port over 5 years (9/4/2020). Social History/Living Environment:   Home Environment: Private residence  # Steps to Enter: 2  Rails to Enter: No  One/Two Story Residence: Two story, live on 1st floor  Living Alone: No  Support Systems: Spouse/Significant Other/Partner  Patient Expects to be Discharged to[de-identified] Private residence  Current DME Used/Available at Home: None  Tub or Shower Type: Shower  Prior Level of Function/Work/Activity:  Lives with spouse and PTA independent with ambulation. Number of Personal Factors/Comorbidities that affect the Plan of Care: 1-2: MODERATE COMPLEXITY   EXAMINATION:   Most Recent Physical Functioning:   Gross Assessment:                  Posture:     Balance:  Sitting: Intact  Standing: Impaired  Standing - Static: Good  Standing - Dynamic : Fair Bed Mobility:     Wheelchair Mobility:     Transfers:  Sit to Stand: Stand-by assistance;Supervision  Stand to Sit: Stand-by assistance;Supervision  Gait:     Base of Support: Narrowed  Speed/Margo: Slow  Step Length: Left shortened;Right shortened  Gait Abnormalities: Decreased step clearance  Distance (ft): 500 Feet (ft)  Assistive Device: (None)  Ambulation - Level of Assistance: Stand-by assistance      Body Structures Involved:  1. Lungs  2. Digestive Structures  3. Metabolic  4. Muscles Body Functions Affected:  1. Sensory/Pain  2. Respiratory  3. Neuromusculoskeletal  4. Movement Related  5. Digestive  6. Metobolic/Endocrine Activities and Participation Affected:  1. General Tasks and Demands  2. Mobility  3. Self Care  4. Domestic Life  5.  Community, Social and [place]   Number of elements that affect the Plan of Care: 4+: HIGH COMPLEXITY   CLINICAL PRESENTATION:   Presentation: Stable and uncomplicated: LOW COMPLEXITY   CLINICAL DECISION MAKING: [facility] Mobility Inpatient Short Form  How much difficulty does the patient currently have. .. Unable A Lot A Little None   1. Turning over in bed (including adjusting bedclothes, sheets and blankets)? [] 1   [] 2   [] 3   [x] 4   2. Sitting down on and standing up from a chair with arms ( e.g., wheelchair, bedside commode, etc.)   [] 1   [] 2   [x] 3   [] 4   3. Moving from lying on back to sitting on the side of the bed? [] 1   [] 2   [x] 3   [] 4   How much help from another person does the patient currently need. .. Total A Lot A Little None   4. Moving to and from a bed to a chair (including a wheelchair)? [] 1   [] 2   [x] 3   [] 4   5. Need to walk in hospital room? [] 1   [] 2   [x] 3   [] 4   6. Climbing 3-5 steps with a railing? [] 1   [] 2   [x] 3   [] 4   © 2007, Trustees of [address] [place] [address] [address] [address], under license to [employer]. All rights reserved      Score:  Initial: 19 Most Recent: X (Date: -- )    Interpretation of Tool:  Represents activities that are increasingly more difficult (i.e. Bed mobility, Transfers, Gait). Medical Necessity:     · Patient demonstrates   · good  ·  rehab potential due to higher previous functional level. Reason for Services/Other Comments:  · Patient continues to require skilled intervention due to   · Decreased balance and activity tolerance  · . Use of outcome tool(s) and clinical judgement create a POC that gives a: Clear prediction of patient's progress: LOW COMPLEXITY            TREATMENT:   (In addition to Assessment/Re-Assessment sessions the following treatments were rendered)   Pre-treatment Symptoms/Complaints:  No complaints  Pain: Initial:   Pain Intensity 1: 0  Post Session:  0/10     Therapeutic Activity: (    24 minutes):   Therapeutic activities including transfer training from various surface heights, ambulation on level ground, static/dynamic standing balance, and patient education to improve mobility, strength, balance, and activity tolerance . Required minimal verbal cues   to promote static and dynamic balance in standing and promote coordination of bilateral, lower extremity(s). Date:  9/10/20 Date:   Date:     ACTIVITY/EXERCISE AM PM AM PM AM PM   Seated LAQ 1 x 20 B        Seated marching 1 x 20 B        Seated AP 1 x 20 B        Seated hip abd/add 1 x 20 R  1 x 18 L                                   B = bilateral; AA = active assistive; A = active; P = passive        Braces/Orthotics/Lines/Etc:   · IV  · drain GILDA x 1  Treatment/Session Assessment:    · Response to Treatment:  See above  · Interdisciplinary Collaboration:   o Physical Therapy Assistant  o Registered Nurse  · After treatment position/precautions:   o Up in chair  o Bed/Chair-wheels locked  o Call light within reach  o RN notified  o Family at bedside   · Compliance with Program/Exercises: Will assess as treatment progresses  · Recommendations/Intent for next treatment session: \"Next visit will focus on advancements to more challenging activities and reduction in assistance provided\".   Total Treatment Duration:  PT Patient Time In/Time Out  Time In: 1114  Time Out: [address] [facility], [facility]

## 2020-09-14 NOTE — PROGRESS NOTES
Problem: Falls - Risk of  Goal: *Absence of Falls  Description: Document Sophie Griffin Fall Risk and appropriate interventions in the flowsheet. Outcome: Progressing Towards Goal  Note: Fall Risk Interventions:  Mobility Interventions: Communicate number of staff needed for ambulation/transfer, Patient to call before getting OOB         Medication Interventions: Teach patient to arise slowly, Patient to call before getting OOB    Elimination Interventions: Call light in reach, Patient to call for help with toileting needs, Toileting schedule/hourly rounds, Urinal in reach              Problem: Patient Education: Go to Patient Education Activity  Goal: Patient/Family Education  Outcome: Progressing Towards Goal     Problem: Non-Violent Restraints  Goal: *Patient Specific Goal (EDIT GOAL, INSERT TEXT)  Outcome: Progressing Towards Goal     Problem: Pressure Injury - Risk of  Goal: *Prevention of pressure injury  Description: Document Amadeo Scale and appropriate interventions in the flowsheet. Outcome: Progressing Towards Goal  Note: Pressure Injury Interventions:  Sensory Interventions: Assess changes in LOC, Assess need for specialty bed, Avoid rigorous massage over bony prominences, Check visual cues for pain, Discuss PT/OT consult with provider, Float heels, Maintain/enhance activity level, Minimize linen layers, Monitor skin under medical devices, Pad between skin to skin, Pressure redistribution bed/mattress (bed type), Turn and reposition approx.  every two hours (pillows and wedges if needed)         Activity Interventions: Increase time out of bed, Pressure redistribution bed/mattress(bed type), PT/OT evaluation    Mobility Interventions: HOB 30 degrees or less, Pressure redistribution bed/mattress (bed type), PT/OT evaluation    Nutrition Interventions: Document food/fluid/supplement intake, Offer support with meals,snacks and hydration    Friction and Shear Interventions: Apply protective barrier, creams and emollients, Foam dressings/transparent film/skin sealants                Problem: Patient Education: Go to Patient Education Activity  Goal: Patient/Family Education  Outcome: Progressing Towards Goal     Problem: Patient Education: Go to Patient Education Activity  Goal: Patient/Family Education  Outcome: Progressing Towards Goal     Problem: Patient Education: Go to Patient Education Activity  Goal: Patient/Family Education  Outcome: Progressing Towards Goal

## 2020-09-14 NOTE — PROGRESS NOTES
END OF SHIFT NOTE:    INTAKE/OUTPUT  09/12 0701 - 09/13 0700  In: 8696 [I.V.:3409]  Out: 6615 [Urine:3375; Drains:175]  Voiding: YES  Catheter: NO  Drain:   Richard-Garza Drain 09/07/20 Anterior;Right Abdomen (Active)   Site Assessment Clean, dry, & intact 09/13/20 1443   Dressing Status Clean, dry, & intact; New 09/13/20 1443   Status Patent; Charged;Draining 09/13/20 1443   Drainage Color Serosanguinous 09/13/20 1443   Output (ml) 20 ml 09/13/20 1443       Richard-Garza Drain 09/07/20 Anterior; Left Abdomen (Active)   Site Assessment Clean, dry, & intact 09/13/20 1443   Dressing Status Clean, dry, & intact 09/13/20 1443   Status Patent; Charged;Draining 09/13/20 1443   Drainage Color Serous 09/13/20 1443   Output (ml) 20 ml 09/13/20 1443               Flatus: Patient does have flatus present. Stool:  3 occurrences. Characteristics:  Stool Assessment  Stool Color: Brown  Stool Appearance: Loose  Stool Amount: Medium  Stool Source/Status: Rectum    Emesis: 0 occurrences. Characteristics:        VITAL SIGNS  Patient Vitals for the past 12 hrs:   Temp Pulse Resp BP SpO2   09/13/20 2030 98.1 °F (36.7 °C) 95 18 136/72 97 %   09/13/20 2006     97 %   09/13/20 1656 97.7 °F (36.5 °C) 66 18 130/68 98 %   09/13/20 1147 98.1 °F (36.7 °C) (!) 57 18 (!) 154/73 98 %       Pain Assessment  Pain Intensity 1: 0 (09/13/20 1930)  Pain Location 1: Abdomen, Back  Pain Intervention(s) 1: Medication (see MAR)  Patient Stated Pain Goal: 0    Ambulating  Yes    Shift report given to oncoming nurse at the bedside.     Shruthi Luke RN

## 2020-09-15 ENCOUNTER — APPOINTMENT (OUTPATIENT)
Dept: GENERAL RADIOLOGY | Age: 65
DRG: 326 | End: 2020-09-15
Attending: INTERNAL MEDICINE
Payer: COMMERCIAL

## 2020-09-15 LAB
ALBUMIN SERPL-MCNC: 2 G/DL (ref 3.2–4.6)
ALBUMIN/GLOB SERPL: 0.6 {RATIO} (ref 1.2–3.5)
ALP SERPL-CCNC: 205 U/L (ref 50–136)
ALT SERPL-CCNC: 48 U/L (ref 12–65)
ANION GAP SERPL CALC-SCNC: 6 MMOL/L (ref 7–16)
AST SERPL-CCNC: 21 U/L (ref 15–37)
ATRIAL RATE: 76 BPM
BILIRUB SERPL-MCNC: 1.7 MG/DL (ref 0.2–1.1)
BUN SERPL-MCNC: 11 MG/DL (ref 8–23)
CALCIUM SERPL-MCNC: 8.4 MG/DL (ref 8.3–10.4)
CALCULATED P AXIS, ECG09: 24 DEGREES
CALCULATED R AXIS, ECG10: 22 DEGREES
CALCULATED T AXIS, ECG11: 7 DEGREES
CHLORIDE SERPL-SCNC: 103 MMOL/L (ref 98–107)
CO2 SERPL-SCNC: 27 MMOL/L (ref 21–32)
CREAT SERPL-MCNC: 0.89 MG/DL (ref 0.8–1.5)
DIAGNOSIS, 93000: NORMAL
GLOBULIN SER CALC-MCNC: 3.6 G/DL (ref 2.3–3.5)
GLUCOSE BLD STRIP.AUTO-MCNC: 145 MG/DL (ref 65–100)
GLUCOSE BLD STRIP.AUTO-MCNC: 152 MG/DL (ref 65–100)
GLUCOSE BLD STRIP.AUTO-MCNC: 160 MG/DL (ref 65–100)
GLUCOSE BLD STRIP.AUTO-MCNC: 162 MG/DL (ref 65–100)
GLUCOSE SERPL-MCNC: 115 MG/DL (ref 65–100)
P-R INTERVAL, ECG05: 112 MS
POTASSIUM SERPL-SCNC: 3.5 MMOL/L (ref 3.5–5.1)
PROT SERPL-MCNC: 5.6 G/DL (ref 6.3–8.2)
Q-T INTERVAL, ECG07: 394 MS
QRS DURATION, ECG06: 82 MS
QTC CALCULATION (BEZET), ECG08: 443 MS
SODIUM SERPL-SCNC: 136 MMOL/L (ref 136–145)
VENTRICULAR RATE, ECG03: 76 BPM

## 2020-09-15 PROCEDURE — 74011250636 HC RX REV CODE- 250/636: Performed by: SURGERY

## 2020-09-15 PROCEDURE — 74011636637 HC RX REV CODE- 636/637: Performed by: PHYSICIAN ASSISTANT

## 2020-09-15 PROCEDURE — 94640 AIRWAY INHALATION TREATMENT: CPT

## 2020-09-15 PROCEDURE — 74011250637 HC RX REV CODE- 250/637: Performed by: NURSE PRACTITIONER

## 2020-09-15 PROCEDURE — 94760 N-INVAS EAR/PLS OXIMETRY 1: CPT

## 2020-09-15 PROCEDURE — 74011000250 HC RX REV CODE- 250: Performed by: NURSE PRACTITIONER

## 2020-09-15 PROCEDURE — 82962 GLUCOSE BLOOD TEST: CPT

## 2020-09-15 PROCEDURE — 97530 THERAPEUTIC ACTIVITIES: CPT

## 2020-09-15 PROCEDURE — 74011000250 HC RX REV CODE- 250: Performed by: INTERNAL MEDICINE

## 2020-09-15 PROCEDURE — 2709999900 HC NON-CHARGEABLE SUPPLY

## 2020-09-15 PROCEDURE — 80053 COMPREHEN METABOLIC PANEL: CPT

## 2020-09-15 PROCEDURE — 65660000000 HC RM CCU STEPDOWN

## 2020-09-15 PROCEDURE — 97110 THERAPEUTIC EXERCISES: CPT

## 2020-09-15 PROCEDURE — 74011000258 HC RX REV CODE- 258: Performed by: SURGERY

## 2020-09-15 PROCEDURE — 71045 X-RAY EXAM CHEST 1 VIEW: CPT

## 2020-09-15 RX ORDER — LISINOPRIL 5 MG/1
5 TABLET ORAL DAILY
Status: DISCONTINUED | OUTPATIENT
Start: 2020-09-15 | End: 2020-09-16 | Stop reason: HOSPADM

## 2020-09-15 RX ADMIN — PANTOPRAZOLE SODIUM 40 MG: 40 TABLET, DELAYED RELEASE ORAL at 16:45

## 2020-09-15 RX ADMIN — INSULIN LISPRO 2 UNITS: 100 INJECTION, SOLUTION INTRAVENOUS; SUBCUTANEOUS at 08:39

## 2020-09-15 RX ADMIN — OXYCODONE HYDROCHLORIDE AND ACETAMINOPHEN 1 TABLET: 7.5; 325 TABLET ORAL at 15:13

## 2020-09-15 RX ADMIN — INSULIN LISPRO 2 UNITS: 100 INJECTION, SOLUTION INTRAVENOUS; SUBCUTANEOUS at 12:51

## 2020-09-15 RX ADMIN — HYDROMORPHONE HYDROCHLORIDE 1 MG: 1 INJECTION, SOLUTION INTRAMUSCULAR; INTRAVENOUS; SUBCUTANEOUS at 03:50

## 2020-09-15 RX ADMIN — INSULIN LISPRO 2 UNITS: 100 INJECTION, SOLUTION INTRAVENOUS; SUBCUTANEOUS at 16:45

## 2020-09-15 RX ADMIN — SUCRALFATE 1 G: 1 TABLET ORAL at 10:50

## 2020-09-15 RX ADMIN — HYDROMORPHONE HYDROCHLORIDE 1 MG: 1 INJECTION, SOLUTION INTRAMUSCULAR; INTRAVENOUS; SUBCUTANEOUS at 23:22

## 2020-09-15 RX ADMIN — SUCRALFATE 1 G: 1 TABLET ORAL at 05:55

## 2020-09-15 RX ADMIN — PANTOPRAZOLE SODIUM 40 MG: 40 TABLET, DELAYED RELEASE ORAL at 05:55

## 2020-09-15 RX ADMIN — SUCRALFATE 1 G: 1 TABLET ORAL at 21:17

## 2020-09-15 RX ADMIN — SUCRALFATE 1 G: 1 TABLET ORAL at 16:45

## 2020-09-15 RX ADMIN — ALBUTEROL SULFATE 2.5 MG: 2.5 SOLUTION RESPIRATORY (INHALATION) at 08:08

## 2020-09-15 RX ADMIN — LISINOPRIL 5 MG: 5 TABLET ORAL at 10:50

## 2020-09-15 RX ADMIN — SODIUM CHLORIDE SOLN NEBU 3% 4 ML: 3 NEBU SOLN at 08:08

## 2020-09-15 RX ADMIN — OXYCODONE HYDROCHLORIDE AND ACETAMINOPHEN 1 TABLET: 7.5; 325 TABLET ORAL at 19:17

## 2020-09-15 RX ADMIN — PIPERACILLIN AND TAZOBACTAM 4.5 G: 4; .5 INJECTION, POWDER, FOR SOLUTION INTRAVENOUS at 05:53

## 2020-09-15 NOTE — PROGRESS NOTES
Comprehensive Nutrition Assessment    Type and Reason for Visit: Reassess(TPN management (General Surgery))    Nutrition Recommendations/Plan:   Decrease rate to 25 ml/hr prior to stopping TPN tonight. Discontinue lipids. Continue to advance diet as medically appropriate. Continue Ensure Enlive TID    Nutrition Assessment:  Patient with PMH of GERD and recent dx of pancreatic cancer metastatic to liver. He is s/p EUS 8/25. Admitted from 9/2-9/6 due to acute blood loss s/p mesnteric arteriogram and emboization 9/3. He presented with dark red blood in yesi. Rapid called due to high volume blood from rectum. He is now s/p emergent exp lap with resection of distal stomach and proximal duedenum, cholecystectomy, and gastrojejunostomy 9/7. Patient seen up in recliner today. He states that he is tolerating full liquids without difficulty and is hhappy to have something of substance to eat. He states that he is drinking Ensure Enlive. He has one at bedside and states he is going to drink it later. Medications: SSI (12 units yesterday, 4 thus far today), Protonix, Zosyn, Carafate  Abdomen: hypoactive BS, last BM 9/14- loose/watery  Labs significant for: Unremarkable except Glucose 115, POC Glucoses 146-206 over last 24 hrs. Estimated Daily Nutrient Needs:  Energy (kcal):  2927-0412(99-01 kcal/kg (recent body weight 70.9 kg))  Protein (g):  (1.2-1.5 g/kg (recent body weight 70.9 kg))       Fluid (ml/day):  3258-3267(~1 ml/kcal)    Current Nutrition Therapies:   DIET FULL LIQUID  DIET NUTRITIONAL SUPPLEMENTS Breakfast, Lunch, Dinner; [employer]  Current Parenteral Nutrition Orders:  · Type and Formula: 15%DEX/5%AA   · Lipids: 250ml, Daily  · Duration: Continuous  · Rate/Volume: 1L, 45 ml/hr  · Current PN Order Provides: 1210 kcal (~68% needs), 50 g pro (~59% needs), 150 g CHO (does not exceed mmax CHO), ~1.25 L total volume (~69% needs).     Anthropometric Measures:  · Height:  5' 7\" (170.2 cm)  · Current Body Wt:  81.7 kg (180 lb 1.9 oz)(bed weight)   Body mass index is 28.1 kg/m². · BMI Category: Overweight (BMI 25.0-29. 9)       Nutrition Diagnosis:   · Inadequate oral intake related to altered GI function as evidenced by (NPO/CLD/TPN, now FLD)    Nutrition Interventions:   Food and/or Nutrient Delivery: Continue oral nutrition supplement, Discontinue parenteral nutrition(Continue to advance diet as medically appropriate)  Coordination of Nutrition Care: (Disccussed with [place], RN)    Goals:  Meet at least 75% nutrition needs with oral diet within 7 days       Nutrition Monitoring and Evaluation:   Food/Nutrient Intake Outcomes: Diet advancement/tolerance, Food and nutrient intake, Supplement intake  Physical Signs/Symptoms Outcomes: Biochemical data, GI status    Discharge Planning:     Too soon to determine     [address] [place], LD on 9/15/2020 at 11:03 AM  Contact: [phone]

## 2020-09-15 NOTE — PROGRESS NOTES
PLAN:  NG out 9/12/20  Full liquids  IVFs  Zosyn  SCD/IS/Protonix  for prophylactic measures  Carafate QID  Ok for OOB to chair  Pain/nausea control  Monitor labs  DC left drain 9/14  DC Octreotide 9/14  Drain care  Ok to leave incision GELACIO  Soriano out 9/12/20  Stop TPN  Follow Lytes  Add Lisinopril 5mg    ASSESSMENT:  Admit Date: 9/7/2020   8 Days Post-Op  Procedure(s):  LAPAROTOMY EXPLORATORY /  CHOLECYSTECTOMY    Principal Problem:    Acute blood loss anemia (9/2/2020)    Active Problems:    Essential hypertension (5/8/2018)      Mixed hyperlipidemia (5/8/2018)      Dietz's esophagus without dysplasia (8/12/2020)      Malignant neoplasm of head of pancreas ([facility] [address] (8/31/2020)      Iron deficiency anemia (9/1/2020)      Pancreatic cancer ([facility] [address] (9/2/2020)      Duodenal mass (9/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 Soriano -380mL UOP/24h. NGT with 100mL out. Left GILDA with 90mL serosanguinous otuput;  right GILDA with 170mL bilious output (drains around duodenal stump). WBC 14, H/H 9.7/28. Cr 1.87. BS 200s. On Fentanyl and Octreotide gtts. LFTs pending today. 9/9/2020  POD#2  Extubated yesterday. Alert in bed w/o complaints. Oxygenating well on 2L NC; congested. NGT with 750mL out; dark/bilious. Left GILDA with 45mL serosanguinous otuput;  right GILDA with 110mL serous/bile tinged output (drains around duodenal stump). WBC 13.9, H/H 8.2/25. Cr 1.1. Tbili 2.8.     9/9/2020  POD#3  Transferred to floor yesterday. Alert in bed w/o complaints. Pain controlled. Oxygenating well on 2L NC; congested. NGT with 750mL out; dark/bilious. Left GILDA with 50mL serosanguinous otuput;  right GILDA with 45mL serous output. WBC 14.8, H/H 8.1/25. 6. Tbili up to 5.1. Denies flatus.      9/11/2020  POD#4  Alert in chair w/o complaints. Pain controlled. Oxygenating well on 2L NC; NGT with 600mL out; dark/bilious. Left GILDA with 55mL serosanguinous otuput;  right GILDA with 35mL serous output. Also, some drainage from around Right tube. WBC 14.3, H/H 8.1/25. 6. Tbili 4.5. +BMx2- bloody. 9/12/2020  POD#5 Alert in bed w/o complaints. Pain controlled. Oxygenating well on 2L NC; NGT with 375mL out; dark/bilious. Left GILDA with 100mL serosanguinous otuput;  right GILDA with 55mL serous output. Also, some drainage from around Right tube. WBC 15.9, H/H 8.5/26. 3. Tbili 2.9. +BMx2- bloody. 9/13/2020  POD#6 Alert and sitting in recliner. Pain controlled. On clears; denies nausea or vomiting, however, reports burping. +flatus/+BM. Oxygenating well on 2L NC;  Left GILDA with 105mL serosanguinous output;  right GILDA with 70mL serous output. WBC 16.6, H/H 8.6/27.7. Tbili 2.1. Phos 2.2, K+3.0.    9/14/2020  POD#7 Alert and sitting in recliner. Pain controlled. On clears. +flatus/+BM. Oxygenating well on 2L NC;  Left GILDA with 80mL serosanguinous output;  right GILDA with 75mL serous output. WBC 19, H/H 9.1/29.2. Tbili 1.9. Phos 2.4, K+3.2. 9/15/2020  POD#8 Alert and sitting in recliner. Pain controlled. On fulls. +flatus/+BM. Oxygenating well on room air;  Left GILDA removed;  right GILDA with 40mL serous output. Tbili 1.9. K+3.5. Hypertensive. Intake/Output Summary (Last 24 hours) at 9/15/2020 0831  Last data filed at 9/15/2020 0755  Gross per 24 hour   Intake 636 ml   Output 4955 ml   Net -4319 ml     OBJECTIVE:  Constitutional: Alert oriented cooperative patient in no acute distress; appears stated age   Visit Vitals  BP (!) 148/94 (BP 1 Location: Right arm, BP Patient Position: At rest)   Pulse 64   Temp 98.1 °F (36.7 °C)   Resp 16   Ht 5' 7\" (1.702 m)   Wt 179 lb 6.4 oz (81.4 kg)   SpO2 97%   BMI 28.10 kg/m²     Eyes: Sclera are clear. ENMT: no external lesions gross hearing normal; no obvious neck masses, no ear or lip lesions  CV: RRR.  Normal perfusion  Resp: No JVD. Breathing is  non-labored; no audible wheezing. GI: soft and non-distended, staples c/d/i with staples, GILDA right serous  Musculoskeletal: unremarkable with normal function. No embolic signs or cyanosis.    Neuro:  Oriented; moves all 4; no focal deficits  Psychiatric: normal affect and mood, no memory impairment      Patient Vitals for the past 24 hrs:   BP Temp Pulse Resp SpO2 Weight   09/15/20 0809     97 %    09/15/20 0730 (!) 148/94 98.1 °F (36.7 °C) 64 16 98 %    09/15/20 0435      179 lb 6.4 oz (81.4 kg)   09/15/20 0329 133/73 98.1 °F (36.7 °C) 65 17 96 %    09/14/20 2332 (!) 136/56 98.4 °F (36.9 °C) 74 18 98 %    09/14/20 2015     97 %    09/14/20 1949 (!) 165/84 98.2 °F (36.8 °C) 81 18 98 %    09/14/20 1435 (!) 162/61 98.3 °F (36.8 °C) (!) 46 19 98 %    09/14/20 1200 (!) 153/68 98.5 °F (36.9 °C) 69 30 98 %      Labs:    Recent Labs     09/15/20  0358 09/14/20  0409   WBC  --  19.0*   HGB  --  9.1*   PLT  --  266    136   K 3.5 3.2*    103   CO2 27 28   BUN 11 10   CREA 0.89 0.80   * 149*   TBILI 1.7* 1.9*   ALT 48 49   * 209*       Signed:  Víctor Richards NP

## 2020-09-15 NOTE — PROGRESS NOTES
END OF SHIFT NOTE:    INTAKE/OUTPUT  09/14 0701 - 09/15 0700  In: 1 [I.V.:636]  Out: 4805 [Urine:4745; Drains:60]  Voiding: YES  Catheter: NO  Drain:   Richard-Garza Drain 09/07/20 Anterior;Right Abdomen (Active)   Site Assessment Clean, dry, & intact 09/15/20 0347   Dressing Status New drainage 09/15/20 0347   Status Patent; Charged;Draining 09/15/20 0347   Drainage Color Serosanguinous 09/15/20 0347   Output (ml) 20 ml 09/15/20 0347               Flatus: Patient does have flatus present. Stool:  2 occurrences. Characteristics:  Stool Assessment  Stool Color: [place]  Stool Appearance: Loose, Watery  Stool Amount: Large  Stool Source/Status: Rectum    Emesis: 0 occurrences. Characteristics:        VITAL SIGNS  Patient Vitals for the past 12 hrs:   Temp Pulse Resp BP SpO2   09/15/20 0329 98.1 °F (36.7 °C) 65 17 133/73 96 %   09/14/20 2332 98.4 °F (36.9 °C) 74 18 (!) 136/56 98 %   09/14/20 2015     97 %   09/14/20 1949 98.2 °F (36.8 °C) 81 18 (!) 165/84 98 %       Pain Assessment  Pain Intensity 1: 8 (09/14/20 2300)  Pain Location 1: Abdomen, Incisional  Pain Intervention(s) 1: Medication (see MAR)  Patient Stated Pain Goal: 0    Ambulating  Yes    Shift report given to oncoming nurse at the bedside.     Ever Matias RN

## 2020-09-15 NOTE — PROGRESS NOTES
In room assessing pt, has known history of NSR with PACs but HR sounds very irregular, more irregular than past 2 nights. Dr. Abel Dyson notified, new order for EKG placed. 0401: Upon assessment HR much more regular, occasional PACs.

## 2020-09-15 NOTE — PROGRESS NOTES
END OF SHIFT NOTE:    INTAKE/OUTPUT  09/14 0701 - 09/15 0700  In: 636 [I.V.:636]  Out: 4805 [Urine:4745; Drains:60]  Voiding: YES  Catheter: NO  Drain:   Richard-Garza Drain 09/07/20 Anterior;Right Abdomen (Active)   Site Assessment Drainage (comment) 09/15/20 1336   Dressing Status New 09/15/20 1336   Status Patent; Charged;Draining 09/15/20 0746   Drainage Color Serous 09/15/20 0746   Output (ml) 5 ml 09/15/20 1336               Flatus: Patient does have flatus present. Stool:  1 occurrences. Characteristics:  Stool Assessment  Stool Color: Brown  Stool Appearance: Formed  Stool Amount: Small  Stool Source/Status: Rectum    Emesis: 0 occurrences. Characteristics:        VITAL SIGNS  Patient Vitals for the past 12 hrs:   Temp Pulse Resp BP SpO2   09/15/20 1453 98.4 °F (36.9 °C) 65 20 128/73 98 %   09/15/20 1115 98 °F (36.7 °C) 70 20 115/80 98 %   09/15/20 1049    115/80    09/15/20 0809     97 %   09/15/20 0730 98.1 °F (36.7 °C) 64 16 (!) 148/94 98 %       Pain Assessment  Pain Intensity 1: 6 (09/15/20 1513)  Pain Location 1: Abdomen  Pain Intervention(s) 1: Medication (see MAR)  Patient Stated Pain Goal: 0    Ambulating  Yes, Ambulated in white. Up in chair for most of the day  Shift report given to oncoming nurse at the bedside.     Charla Garza RN

## 2020-09-15 NOTE — PROGRESS NOTES
Problem: Mobility Impaired (Adult and Pediatric)  Goal: *Acute Goals and Plan of Care (Insert Text)  Outcome: Progressing Towards Goal  Note: LTG:  (1.)Mr. Dianna Fritz will move from supine to sit and sit to supine and roll side to side, using log roll technique, with MODIFIED INDEPENDENCE within 7 treatment day(s). (2.)Mr. Dianna Fritz will transfer from bed to chair and chair to bed with MODIFIED INDEPENDENCE using the least restrictive device within 7 treatment day(s). (3.)Mr. Dianna Fritz will ambulate with MODIFIED INDEPENDENCE for 500 feet with the least restrictive device within 7 treatment day(s). (4.)Mr. Dianna Fritz will participate in therapeutic activity/exercises x 25 minutes for increased strength within 7 treatment days. (5.)Mr. Dianna Fritz will ascend and descend 2 stairs using 0 hand rail(s) with SUPERVISION to improve functional mobility and safety within 7 treatment day(s). ________________________________________________________________________________________________       PHYSICAL THERAPY: Daily Note and PM 9/15/2020  INPATIENT: PT Visit Days : 4  Payor: [address] [place] [address] / Plan: [id number] / Product Type: PPO /       NAME/AGE/GENDER: Temitope Izquierdo is a 60 y.o. 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 Dianna Fritz was sitting up in recliner chair upon contact and agreeable to PT. He performed seated BLE with minimal cues for performance and technique. On RA today, SpO2 pre- during and post-ambulation was >98% on room air.  Patient transfers to standing with SBA and ambulates into bathroom where he performs toilet transfer with supervision. Patient demonstrates good static standing balance and fair dynamic standing balance during standing ADL activity. Patient then ambulates 500' in hallway without use of assistive device, SBA and no LOB noted. Patient ambulates at a slow, steady gait pattern. Overall good progress towards physical therapy goals. Goals listed above are still appropriate. Will continue efforts as patient is still below functional baseline. This section established at most recent assessment   PROBLEM LIST (Impairments causing functional limitations):  1. Decreased Strength  2. Decreased Transfer Abilities  3. Decreased Ambulation Ability/Technique  4. Decreased Balance  5. Increased Pain  6. Decreased Activity Tolerance   INTERVENTIONS PLANNED: (Benefits and precautions of physical therapy have been discussed with the patient.)  1. Balance Exercise  2. Bed Mobility  3. Family Education  4. Gait Training  5. Home Exercise Program (HEP)  6. Neuromuscular Re-education/Strengthening  7. Therapeutic Activites  8. Therapeutic Exercise/Strengthening  9. Transfer Training     TREATMENT PLAN: Frequency/Duration: 3 times a week for duration of hospital stay  Rehabilitation Potential For Stated Goals: Good     REHAB RECOMMENDATIONS (at time of discharge pending progress):    Placement: It is my opinion, based on this patient's performance to date, that Mr. Maggy Villagran may benefit from [address] after discharge due to the functional deficits listed above that are likely to improve with skilled rehabilitation because he/she has multiple medical issues that affect his/her functional mobility in the community. Equipment:    None at this time              HISTORY:   History of Present Injury/Illness (Reason for Referral):  Blood loss  Past Medical History/Comorbidities:   Mr. Maggy Villagran  has a past medical history of Cancer ([facility] [address] GERD (gastroesophageal reflux disease), and Hernia.   Mr. Maggy Villagran  has a past surgical history that includes hx hernia repair (2007); hx orthopaedic (Right, 1982); hx cataract removal (Left, 12/15/2017); ir occl txcath hemmorage w si (9/3/2020); and ir insert tunl cvc w port over 5 years (9/4/2020). Social History/Living Environment:   Home Environment: Private residence  # Steps to Enter: 2  Rails to Enter: No  One/Two Story Residence: Two story, live on 1st floor  Living Alone: No  Support Systems: Spouse/Significant Other/Partner  Patient Expects to be Discharged to[de-identified] Private residence  Current DME Used/Available at Home: None  Tub or Shower Type: Shower  Prior Level of Function/Work/Activity:  Lives with spouse and PTA independent with ambulation. Number of Personal Factors/Comorbidities that affect the Plan of Care: 1-2: MODERATE COMPLEXITY   EXAMINATION:   Most Recent Physical Functioning:   Gross Assessment:                  Posture:     Balance:  Sitting: Intact  Standing: Impaired  Standing - Static: Good  Standing - Dynamic : Fair Bed Mobility:     Wheelchair Mobility:     Transfers:  Sit to Stand: Stand-by assistance  Stand to Sit: Stand-by assistance  Bed to Chair: Stand-by assistance  Gait:     Base of Support: Narrowed  Speed/Margo: Slow  Step Length: Left shortened;Right shortened  Gait Abnormalities: Decreased step clearance  Distance (ft): 500 Feet (ft)  Assistive Device: (none)  Ambulation - Level of Assistance: Stand-by assistance      Body Structures Involved:  1. Lungs  2. Digestive Structures  3. Metabolic  4. Muscles Body Functions Affected:  1. Sensory/Pain  2. Respiratory  3. Neuromusculoskeletal  4. Movement Related  5. Digestive  6. Metobolic/Endocrine Activities and Participation Affected:  1. General Tasks and Demands  2. Mobility  3. Self Care  4. Domestic Life  5.  Community, Social and [place]   Number of elements that affect the Plan of Care: 4+: HIGH COMPLEXITY   CLINICAL PRESENTATION:   Presentation: Stable and uncomplicated: LOW COMPLEXITY   CLINICAL DECISION MAKIN[address] [place] [id number] AM-PAC 6 Clicks   Basic Mobility Inpatient Short Form  How much difficulty does the patient currently have. .. Unable A Lot A Little None   1. Turning over in bed (including adjusting bedclothes, sheets and blankets)? [] 1   [] 2   [] 3   [x] 4   2. Sitting down on and standing up from a chair with arms ( e.g., wheelchair, bedside commode, etc.)   [] 1   [] 2   [x] 3   [] 4   3. Moving from lying on back to sitting on the side of the bed? [] 1   [] 2   [x] 3   [] 4   How much help from another person does the patient currently need. .. Total A Lot A Little None   4. Moving to and from a bed to a chair (including a wheelchair)? [] 1   [] 2   [x] 3   [] 4   5. Need to walk in hospital room? [] 1   [] 2   [x] 3   [] 4   6. Climbing 3-5 steps with a railing? [] 1   [] 2   [x] 3   [] 4   © , Trustees of [address] [place], [place] [id number], under license to [employer]. All rights reserved      Score:  Initial: 19 Most Recent: X (Date: -- )    Interpretation of Tool:  Represents activities that are increasingly more difficult (i.e. Bed mobility, Transfers, Gait). Medical Necessity:     · Patient demonstrates   · good  ·  rehab potential due to higher previous functional level. Reason for Services/Other Comments:  · Patient continues to require skilled intervention due to   · Decreased balance and activity tolerance  · . Use of outcome tool(s) and clinical judgement create a POC that gives a: Clear prediction of patient's progress: LOW COMPLEXITY            TREATMENT:   (In addition to Assessment/Re-Assessment sessions the following treatments were rendered)   Pre-treatment Symptoms/Complaints:  No complaints  Pain: Initial:      Post Session:  0/10     Therapeutic Activity: (    24 minutes):   Therapeutic activities including transfer training from various surface heights, ambulation on level ground, static/dynamic standing balance, and patient education to improve mobility, strength, balance, and activity tolerance . Required minimal verbal cues   to promote static and dynamic balance in standing and promote coordination of bilateral, lower extremity(s). Therapeutic Exercise: (  14 minutes):  Exercises per grid below to improve mobility, strength and balance. Required minimal visual and verbal cues to promote proper body alignment. Progressed repetitions as indicated. Date:  9/10/20 Date:  9/15/20 Date:     ACTIVITY/EXERCISE AM PM AM PM AM PM   Seated LAQ 1 x 20 B   x30 B A     Seated marching 1 x 20 B   x30 B A     Seated AP 1 x 20 B   x30 B A     Seated hip abd/add 1 x 20 R  1 x 18 L   x30 B A                                B = bilateral; AA = active assistive; A = active; P = passive        Braces/Orthotics/Lines/Etc:   · IV  Treatment/Session Assessment:    · Response to Treatment:  See above  · Interdisciplinary Collaboration:   o Physical Therapist  o Registered Nurse  · After treatment position/precautions:   o Up in chair  o Bed/Chair-wheels locked  o Call light within reach  o RN notified  o Family at bedside   · Compliance with Program/Exercises: Will assess as treatment progresses  · Recommendations/Intent for next treatment session: \"Next visit will focus on advancements to more challenging activities and reduction in assistance provided\".   Total Treatment Duration:       Mina Nyhan, PT, DPT

## 2020-09-16 ENCOUNTER — APPOINTMENT (OUTPATIENT)
Dept: GENERAL RADIOLOGY | Age: 65
DRG: 326 | End: 2020-09-16
Attending: INTERNAL MEDICINE
Payer: COMMERCIAL

## 2020-09-16 VITALS
OXYGEN SATURATION: 96 % | HEIGHT: 67 IN | TEMPERATURE: 98.5 F | DIASTOLIC BLOOD PRESSURE: 59 MMHG | WEIGHT: 179.9 LBS | RESPIRATION RATE: 16 BRPM | HEART RATE: 67 BPM | SYSTOLIC BLOOD PRESSURE: 138 MMHG | BODY MASS INDEX: 28.24 KG/M2

## 2020-09-16 LAB
ALBUMIN SERPL-MCNC: 1.8 G/DL (ref 3.2–4.6)
ALBUMIN/GLOB SERPL: 0.5 {RATIO} (ref 1.2–3.5)
ALP SERPL-CCNC: 216 U/L (ref 50–136)
ALT SERPL-CCNC: 43 U/L (ref 12–65)
ANION GAP SERPL CALC-SCNC: 6 MMOL/L (ref 7–16)
AST SERPL-CCNC: 19 U/L (ref 15–37)
BILIRUB SERPL-MCNC: 1.3 MG/DL (ref 0.2–1.1)
BUN SERPL-MCNC: 11 MG/DL (ref 8–23)
CALCIUM SERPL-MCNC: 8.3 MG/DL (ref 8.3–10.4)
CHLORIDE SERPL-SCNC: 102 MMOL/L (ref 98–107)
CO2 SERPL-SCNC: 28 MMOL/L (ref 21–32)
CREAT SERPL-MCNC: 0.81 MG/DL (ref 0.8–1.5)
GLOBULIN SER CALC-MCNC: 3.5 G/DL (ref 2.3–3.5)
GLUCOSE BLD STRIP.AUTO-MCNC: 107 MG/DL (ref 65–100)
GLUCOSE SERPL-MCNC: 121 MG/DL (ref 65–100)
MAGNESIUM SERPL-MCNC: 2 MG/DL (ref 1.8–2.4)
PHOSPHATE SERPL-MCNC: 2.5 MG/DL (ref 2.3–3.7)
POTASSIUM SERPL-SCNC: 3.5 MMOL/L (ref 3.5–5.1)
PROT SERPL-MCNC: 5.3 G/DL (ref 6.3–8.2)
SODIUM SERPL-SCNC: 136 MMOL/L (ref 136–145)

## 2020-09-16 PROCEDURE — 74011250637 HC RX REV CODE- 250/637: Performed by: NURSE PRACTITIONER

## 2020-09-16 PROCEDURE — 82962 GLUCOSE BLOOD TEST: CPT

## 2020-09-16 PROCEDURE — 90471 IMMUNIZATION ADMIN: CPT

## 2020-09-16 PROCEDURE — 36415 COLL VENOUS BLD VENIPUNCTURE: CPT

## 2020-09-16 PROCEDURE — 2709999900 HC NON-CHARGEABLE SUPPLY

## 2020-09-16 PROCEDURE — 71045 X-RAY EXAM CHEST 1 VIEW: CPT

## 2020-09-16 PROCEDURE — 74011250636 HC RX REV CODE- 250/636: Performed by: SURGERY

## 2020-09-16 PROCEDURE — 83735 ASSAY OF MAGNESIUM: CPT

## 2020-09-16 PROCEDURE — 80053 COMPREHEN METABOLIC PANEL: CPT

## 2020-09-16 PROCEDURE — 94760 N-INVAS EAR/PLS OXIMETRY 1: CPT

## 2020-09-16 PROCEDURE — 84100 ASSAY OF PHOSPHORUS: CPT

## 2020-09-16 PROCEDURE — 90686 IIV4 VACC NO PRSV 0.5 ML IM: CPT | Performed by: SURGERY

## 2020-09-16 RX ORDER — OXYCODONE AND ACETAMINOPHEN 7.5; 325 MG/1; MG/1
1 TABLET ORAL
Qty: 30 TAB | Refills: 0 | Status: SHIPPED | OUTPATIENT
Start: 2020-09-16 | End: 2020-09-21

## 2020-09-16 RX ORDER — LISINOPRIL 5 MG/1
5 TABLET ORAL DAILY
Qty: 30 TAB | Refills: 2 | Status: SHIPPED | OUTPATIENT
Start: 2020-09-16 | End: 2020-12-15

## 2020-09-16 RX ORDER — PANTOPRAZOLE SODIUM 40 MG/1
40 TABLET, DELAYED RELEASE ORAL 2 TIMES DAILY
Qty: 60 TAB | Refills: 2 | Status: SHIPPED | OUTPATIENT
Start: 2020-09-16 | End: 2020-12-15

## 2020-09-16 RX ORDER — ZOLPIDEM TARTRATE 5 MG/1
5 TABLET ORAL
Qty: 14 TAB | Refills: 0 | Status: SHIPPED | OUTPATIENT
Start: 2020-09-16 | End: 2020-09-21

## 2020-09-16 RX ORDER — SODIUM CHLORIDE FOR INHALATION 3 %
4 VIAL, NEBULIZER (ML) INHALATION
Status: DISCONTINUED | OUTPATIENT
Start: 2020-09-16 | End: 2020-09-16 | Stop reason: HOSPADM

## 2020-09-16 RX ORDER — SUCRALFATE 1 G/1
1 TABLET ORAL
Qty: 120 TAB | Refills: 2 | Status: SHIPPED | OUTPATIENT
Start: 2020-09-16 | End: 2020-12-15

## 2020-09-16 RX ADMIN — PANTOPRAZOLE SODIUM 40 MG: 40 TABLET, DELAYED RELEASE ORAL at 06:04

## 2020-09-16 RX ADMIN — LISINOPRIL 5 MG: 5 TABLET ORAL at 09:28

## 2020-09-16 RX ADMIN — HYDROMORPHONE HYDROCHLORIDE 1 MG: 1 INJECTION, SOLUTION INTRAMUSCULAR; INTRAVENOUS; SUBCUTANEOUS at 05:42

## 2020-09-16 RX ADMIN — INFLUENZA VIRUS VACCINE 0.5 ML: 15; 15; 15; 15 SUSPENSION INTRAMUSCULAR at 09:22

## 2020-09-16 RX ADMIN — SUCRALFATE 1 G: 1 TABLET ORAL at 06:04

## 2020-09-16 NOTE — DISCHARGE SUMMARY
[address], [address] [facility]  [phone]   Discharge Summary     Sybil Montenegro  MRN: [id number]     : 1955     Age: 60 y.o. Admit date: 2020     Discharge date: 2020  Attending Physician: Graeem Powell MD  Primary Discharge Diagnosis:   Principal Problem:    Acute blood loss anemia (2020)    Active Problems:    Essential hypertension (2018)      Mixed hyperlipidemia (2018)      Dietz's esophagus without dysplasia (2020)      Malignant neoplasm of head of pancreas ([address] (2020)      Iron deficiency anemia (2020)      Pancreatic cancer ([address] (2020)      Duodenal mass (2020)      Pancreatitis (2020)      Hypovolemic shock ([address] (2020)      Respiratory failure, post-operative ([address] (2020)      Duodenal ulcer with hemorrhage (2020)      Primary Operations or Procedures Performed :  Procedure(s):  LAPAROTOMY EXPLORATORY /  CHOLECYSTECTOMY     Brief History and Reason for Admission: Sybil Montenegro was admitted with the following history of present illness. This patient is a 60 y.o. seen and evaluated at the request of admitting service due to blood loss from a mass in duodenum. Pt came to hospital via EMS yesterday after an episode of dizziness and feeling diaphoretic at home. Pt reports what sounds like some melena and small  Amount emesis but no lino hematemesis. Currently he feels ok. He denies any pain at present.      Discussed with Dr Pablo Hernandez who performed his last EGD and EUS. The mass is described as being in duodenum just past the bulb and is necrotic, ulcerated in nature and filling approximately 3/4 of the lumen. Previous attempt at embolization seems to have been temporarily successful.       Chemotherapy and radiation have not been started as of yet. Hospital Course:     The patient underwent Procedure(s):  LAPAROTOMY EXPLORATORY /  CHOLECYSTECTOMY on 9/7/2020. His course was complicated by acute respiratory failure which resolved. The patient progressed satisfactorily meeting milestones necessary for successful discharge including tolerating a diet, adequate mobility, adequate pain control, and active bowel function. Patient was deemed a good candidate for discharge at the time of morning rounding. They are to follow up as indicated in their provided discharge paperwork. The patient helped develop and voices understanding with the plan of care. They are amenable without reservations at this time to moving forward with discharge. Condition at Discharge: Good    Discharge Medications:   Current Discharge Medication List      START taking these medications    Details   oxyCODONE-acetaminophen (PERCOCET 7.5) 7.5-325 mg per tablet Take 1 Tab by mouth every four (4) hours as needed for Pain for up to 5 days. Max Daily Amount: 6 Tabs. Qty: 30 Tab, Refills: 0    Associated Diagnoses: Duodenal ulcer with hemorrhage      sucralfate (CARAFATE) 1 gram tablet Take 1 Tab by mouth Before breakfast, lunch, dinner and at bedtime for 90 days. Qty: 120 Tab, Refills: 2      zolpidem (Ambien) 5 mg tablet Take 1 Tab by mouth nightly as needed for Sleep for up to 14 days. Max Daily Amount: 5 mg. Qty: 14 Tab, Refills: 0    Associated Diagnoses: Insomnia due to medical condition      lisinopriL (PRINIVIL, ZESTRIL) 5 mg tablet Take 1 Tab by mouth daily for 90 days. Qty: 30 Tab, Refills: 2         CONTINUE these medications which have CHANGED    Details   pantoprazole (PROTONIX) 40 mg tablet Take 1 Tab by mouth two (2) times a day for 90 days. Qty: 60 Tab, Refills: 2         CONTINUE these medications which have NOT CHANGED    Details   prochlorperazine (Compazine) 10 mg tablet Take 1 Tab by mouth every six (6) hours as needed for Nausea.  Indications: prevent nausea and vomiting from cancer chemotherapy  Qty: 60 Tab, Refills: 2 ondansetron hcl (Zofran) 8 mg tablet Take 1 Tab by mouth every eight (8) hours as needed for Nausea. Indications: prevent nausea and vomiting from cancer chemotherapy  Qty: 90 Tab, Refills: 2      lidocaine-prilocaine (EMLA) topical cream Apply to port about 45 minutes prior to access  Qty: 30 g, Refills: 5      vit B Cmplx 3-FA-Vit C-Biotin (NEPHRO VERÓNICA RX) 1- mg-mg-mcg tablet Take 1 Tab by mouth daily. cholecalciferol (VITAMIN D3) 1,000 unit tablet Take 1,000 Units by mouth daily. STOP taking these medications       traMADoL (ULTRAM-ER) 100 mg Tb24 Comments:   Reason for Stopping:                 Disposition: Home    Discharge Instructions/Follow-up Care:      MD Instructions: Follow-up with Dr. Gm Ram in 1 week  Keep incisions clean and dry, may remain uncovered. Do not apply lotions, creams or ointments to incisions. Diet - Full liquids; soups, juices, broth, Protein shake or Ensure three times a day  Activity - ambulate - as tolerated - no heavy lifting >10lb. May shower - no tub baths or soaking/submerging. No driving while taking narcotics. Do not drink alcohol while taking narcotics. Resume other home medications. If problems or questions arise, please call our office at [phone].      Greater than 30 minutes were spent discharging the patient    Signed:  Trinity Chavez NP   9/16/2020  9:03 AM

## 2020-09-16 NOTE — PROGRESS NOTES
Pt medically ready for dc home today, 3 n 1 BSC delivered to pts room per pt/spouse request for need at home. [place] setup with [employer] as requested by spouse dixon. Care Management Interventions  PCP Verified by CM: Yes  Mode of Transport at Discharge:  Other (see comment)  Transition of Care Consult (CM Consult): [address]: No  Reason Outside [place]: (pt choices, [facility])  Discharge Durable Medical Equipment: Yes  Physical Therapy Consult: Yes  Occupational Therapy Consult: Yes  Speech Therapy Consult: No  Current Support Network: Lives with Spouse, Lives with Caregiver  Confirm Follow Up Transport: Family  The Plan for Transition of Care is Related to the Following Treatment Goals : home with home care to improve mobility  The Patient and/or Patient Representative was Provided with a Choice of Provider and Agrees with the Discharge Plan?: Yes  Name of the Patient Representative Who was Provided with a Choice of Provider and Agrees with the Discharge Plan: spouse  Freedom of Choice List was Provided with Basic Dialogue that Supports the Patient's Individualized Plan of Care/Goals, Treatment Preferences and Shares the Quality Data Associated with the Providers?: Yes  [employer] Information Provided?: ([employer])  Discharge Location  Discharge Placement: Home with home health

## 2020-09-16 NOTE — PROGRESS NOTES
Spiritual Care Visit. Patient was discharged today. .    Visit by Saima Fabian, Staff .  M.Ed., Nando.[email or web address]., B.A.

## 2020-09-16 NOTE — PROGRESS NOTES
END OF SHIFT NOTE:    INTAKE/OUTPUT  09/15 0701 - 09/16 0700  In: 6522 [P.O.:480; I.V.:845]  Out: 1131 [Urine:4025; Drains:15]  Voiding: YES  Catheter: NO  Drain:              Flatus: Patient does have flatus present. Stool:  0 occurrences. Characteristics:    Emesis: 0 occurrences. Characteristics:        VITAL SIGNS  Patient Vitals for the past 12 hrs:   Temp Pulse Resp BP SpO2   09/16/20 0324 98.4 °F (36.9 °C) 60 16 123/60 94 %   09/15/20 2234 98 °F (36.7 °C) 72 17 132/72 94 %   09/15/20 1926 98.1 °F (36.7 °C) 63 16 132/68 99 %       Pain Assessment  Pain Intensity 1: 7 (09/16/20 0542)  Pain Location 1: Abdomen  Pain Intervention(s) 1: Medication (see MAR)  Patient Stated Pain Goal: 0    Ambulating  Yes    Shift report to be given to oncoming nurse at the bedside.     [address]

## 2020-09-16 NOTE — DISCHARGE INSTRUCTIONS
Discharge Instructions/Follow-up Plans:   MD Instructions: Follow-up with Dr. Rich Gunter in 1 week  Keep incisions clean and dry, may remain uncovered. Do not apply lotions, creams or ointments to incisions. Diet - Full liquids; soups, juices, broth, Protein shake or Ensure three times a day  Activity - ambulate - as tolerated - no heavy lifting >10lb. May shower - no tub baths or soaking/submerging. No driving while taking narcotics. Do not drink alcohol while taking narcotics. Resume other home medications. If problems or questions arise, please call our office at [phone]. Patient Education      Gallbladder Removal Surgery: What to Expect at Home  Your Recovery  After your surgery, you will likely feel weak and tired for several days after you return home. Your belly may be swollen. If you had laparoscopic surgery, you may also have pain in your shoulder for about 24 hours. You may have gas or need to burp a lot at first. A few people get diarrhea. The diarrhea usually goes away in 2 to 4 weeks, but it may last longer. How quickly you recover depends on whether you had a laparoscopic or open surgery. · For a laparoscopic surgery, most people can go back to work or their normal routine in 1 to 2 weeks. But it may take longer, depending on the type of work you do. · For an open surgery, it will probably take 4 to 6 weeks before you get back to your normal routine. This care sheet gives you a general idea about how long it will take for you to recover. However, each person recovers at a different pace. Follow the steps below to get better as quickly as possible. How can you care for yourself at home? Activity    · Rest when you feel tired. Getting enough sleep will help you recover.     · Try to walk each day. Start out by walking a little more than you did the day before. Gradually increase the amount you walk. Walking boosts blood flow and helps prevent pneumonia and constipation.   · For about 2 to 4 weeks, avoid lifting anything that would make you strain. This may include a child, heavy grocery bags and milk containers, a heavy briefcase or backpack, cat litter or dog food bags, or a vacuum .     · Avoid strenuous activities, such as biking, jogging, weightlifting, and aerobic exercise, until your doctor says it is okay.     · You may shower 24 to 48 hours after surgery, if your doctor okays it. Pat the cut (incision) dry. Do not take a bath for the first 2 weeks, or until your doctor tells you it is okay.     · You may drive when you are no longer taking pain medicine and can quickly move your foot from the gas pedal to the brake. You must also be able to sit comfortably for a long period of time, even if you do not plan to go far. You might get caught in traffic.     · For a laparoscopic surgery, most people can go back to work or their normal routine in 1 to 2 weeks, but it may take longer. For an open surgery, it will probably take 4 to 6 weeks before you get back to your normal routine.     · Your doctor will tell you when you can have sex again. Diet    · Eat smaller meals more often instead of fewer larger meals. You can eat a normal diet, but avoid eating fatty foods for about 1 month. Fatty foods include hamburger, whole milk, cheese, and many snack foods. If your stomach is upset, try bland, low-fat foods like plain rice, broiled chicken, toast, and yogurt.     · Drink plenty of fluids (unless your doctor tells you not to).   · If you have diarrhea, try avoiding spicy foods, dairy products, fatty foods, and alcohol. You can also watch to see if specific foods cause it, and stop eating them. If the diarrhea continues for more than 2 weeks, talk to your doctor.     · You may notice that your bowel movements are not regular right after your surgery. This is common. Try to avoid constipation and straining with bowel movements.  You may want to take a fiber supplement every day. If you have not had a bowel movement after a couple of days, ask your doctor about taking a mild laxative. Medicines    · Your doctor will tell you if and when you can restart your medicines. He or she will also give you instructions about taking any new medicines.     · If you take aspirin or some other blood thinner, ask your doctor if and when to start taking it again. Make sure that you understand exactly what your doctor wants you to do.     · Take pain medicines exactly as directed. ? If the doctor gave you a prescription medicine for pain, take it as prescribed. ? If you are not taking a prescription pain medicine, take an over-the-counter medicine such as acetaminophen (Tylenol), ibuprofen (Advil, Motrin), or naproxen (Aleve). Read and follow all instructions on the label. ? Do not take two or more pain medicines at the same time unless the doctor told you to. Many pain medicines contain acetaminophen, which is Tylenol. Too much Tylenol can be harmful.     · If you think your pain medicine is making you sick to your stomach:  ? Take your medicine after meals (unless your doctor tells you not to). ? Ask your doctor for a different pain medicine.     · If your doctor prescribed antibiotics, take them as directed. Do not stop taking them just because you feel better. You need to take the full course of antibiotics. Incision care    · If you have strips of tape on the incision, or cut, leave the tape on for a week or until it falls off.     · After 24 to 48 hours, wash the area daily with warm, soapy water, and pat it dry.     · You may have staples to hold the cut together. Keep them dry until your doctor takes them out. This is usually in 7 to 10 days.     · Keep the area clean and dry. You may cover it with a gauze bandage if it weeps or rubs against clothing. Change the bandage every day.    Ice    · To reduce swelling and pain, put ice or a cold pack on your belly for 10 to 20 minutes at a time. Do this every 1 to 2 hours. Put a thin cloth between the ice and your skin. Follow-up care is a key part of your treatment and safety. Be sure to make and go to all appointments, and call your doctor if you are having problems. It's also a good idea to know your test results and keep a list of the medicines you take. When should you call for help? Call 911 anytime you think you may need emergency care. For example, call if:    · You passed out (lost consciousness).     · You are short of breath. .   Call your doctor now or seek immediate medical care if:    · You are sick to your stomach and cannot drink fluids.     · You have pain that does not get better when you take your pain medicine.     · You cannot pass stools or gas.     · You have signs of infection, such as:  ? Increased pain, swelling, warmth, or redness. ? Red streaks leading from the incision. ? Pus draining from the incision. ? A fever.     · Bright red blood has soaked through the bandage over your incision.     · You have loose stitches, or your incision comes open.     · You have signs of a blood clot in your leg (called a deep vein thrombosis), such as:  ? Pain in your calf, back of knee, thigh, or groin. ? Redness and swelling in your leg or groin. Watch closely for any changes in your health, and be sure to contact your doctor if you have any problems. Where can you learn more? Go to http://miriam-deandra.info/  Enter F357 in the search box to learn more about \"Gallbladder Removal Surgery: What to Expect at Home. \"  Current as of: April 15, 2020               Content Version: 12.6  © 2277-0831 [employer]. Care instructions adapted under license by [employer] (which disclaims liability or warranty for this information).  If you have questions about a medical condition or this instruction, always ask your healthcare professional. Ally [employer] disclaims any warranty or liability for your use of this information.

## 2020-09-17 ENCOUNTER — PATIENT OUTREACH (OUTPATIENT)
Dept: CASE MANAGEMENT | Age: 65
End: 2020-09-17

## 2020-09-17 NOTE — PROGRESS NOTES
CTN attempted to outreach to patient per hospital discharge 9/16/2020 for [facility]UBEN PATEL call. Unable to reach patient. Message left with contact information requesting a return call. Will continue to outreach per protocol.

## 2020-09-18 ENCOUNTER — PATIENT OUTREACH (OUTPATIENT)
Dept: CASE MANAGEMENT | Age: 65
End: 2020-09-18

## 2020-09-18 ENCOUNTER — HOSPITAL ENCOUNTER (OUTPATIENT)
Dept: LAB | Age: 65
Discharge: HOME | End: 2020-09-18
Payer: COMMERCIAL

## 2020-09-18 DIAGNOSIS — C25.9 MALIGNANT NEOPLASM OF PANCREAS, UNSPECIFIED LOCATION OF MALIGNANCY (HCC): ICD-10-CM

## 2020-09-18 LAB
ALBUMIN SERPL-MCNC: 2.2 G/DL (ref 3.2–4.6)
ALBUMIN/GLOB SERPL: 0.6 {RATIO} (ref 1.2–3.5)
ALP SERPL-CCNC: 200 U/L (ref 50–136)
ALT SERPL-CCNC: 37 U/L (ref 12–65)
ANION GAP SERPL CALC-SCNC: 5 MMOL/L (ref 7–16)
AST SERPL-CCNC: 21 U/L (ref 15–37)
BASOPHILS # BLD: 0.1 K/UL (ref 0–0.2)
BASOPHILS NFR BLD: 0 % (ref 0–2)
BILIRUB SERPL-MCNC: 1.3 MG/DL (ref 0.2–1.1)
BUN SERPL-MCNC: 13 MG/DL (ref 8–23)
CALCIUM SERPL-MCNC: 8.4 MG/DL (ref 8.3–10.4)
CANCER AG19-9 SERPL-ACNC: ABNORMAL U/ML (ref 2–37)
CHLORIDE SERPL-SCNC: 102 MMOL/L (ref 98–107)
CO2 SERPL-SCNC: 26 MMOL/L (ref 21–32)
CREAT SERPL-MCNC: 0.9 MG/DL (ref 0.8–1.5)
DIFFERENTIAL METHOD BLD: ABNORMAL
EOSINOPHIL # BLD: 1.5 K/UL (ref 0–0.8)
EOSINOPHIL NFR BLD: 8 % (ref 0.5–7.8)
ERYTHROCYTE [DISTWIDTH] IN BLOOD BY AUTOMATED COUNT: 16.2 % (ref 11.9–14.6)
GLOBULIN SER CALC-MCNC: 3.9 G/DL (ref 2.3–3.5)
GLUCOSE SERPL-MCNC: 128 MG/DL (ref 65–100)
HCT VFR BLD AUTO: 30.3 % (ref 41.1–50.3)
HGB BLD-MCNC: 9.3 G/DL (ref 13.6–17.2)
IMM GRANULOCYTES # BLD AUTO: 0.2 K/UL (ref 0–0.5)
IMM GRANULOCYTES NFR BLD AUTO: 1 % (ref 0–5)
LYMPHOCYTES # BLD: 0.8 K/UL (ref 0.5–4.6)
LYMPHOCYTES NFR BLD: 4 % (ref 13–44)
MCH RBC QN AUTO: 28 PG (ref 26.1–32.9)
MCHC RBC AUTO-ENTMCNC: 30.7 G/DL (ref 31.4–35)
MCV RBC AUTO: 91.3 FL (ref 79.6–97.8)
MONOCYTES # BLD: 1.3 K/UL (ref 0.1–1.3)
MONOCYTES NFR BLD: 7 % (ref 4–12)
NEUTS SEG # BLD: 15.6 K/UL (ref 1.7–8.2)
NEUTS SEG NFR BLD: 80 % (ref 43–78)
NRBC # BLD: 0 K/UL (ref 0–0.2)
PLATELET # BLD AUTO: 456 K/UL (ref 150–450)
PMV BLD AUTO: 10.2 FL (ref 9.4–12.3)
POTASSIUM SERPL-SCNC: 3.7 MMOL/L (ref 3.5–5.1)
PROT SERPL-MCNC: 6.1 G/DL (ref 6.3–8.2)
RBC # BLD AUTO: 3.32 M/UL (ref 4.23–5.67)
SODIUM SERPL-SCNC: 133 MMOL/L (ref 136–145)
WBC # BLD AUTO: 19.5 K/UL (ref 4.3–11.1)

## 2020-09-18 PROCEDURE — 80053 COMPREHEN METABOLIC PANEL: CPT

## 2020-09-18 PROCEDURE — 36415 COLL VENOUS BLD VENIPUNCTURE: CPT

## 2020-09-18 PROCEDURE — 86301 IMMUNOASSAY TUMOR CA 19-9: CPT

## 2020-09-18 PROCEDURE — 85025 COMPLETE CBC W/AUTO DIFF WBC: CPT

## 2020-09-18 NOTE — PROGRESS NOTES
9/18/20 saw pt today with Dr. Gamal Hall for hospital follow up pancreatic cancer. Caris reviewed with pt. Family is wanting to move pt down to [facility] to be close to family. Consult at [facility] scheduled for 9/22. Records sent. 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 Chemo regimen with gem/abraxane. We discussed coordinating with [place] if needed or to just start treatment there. Port has been placed. Provided opportunity to ask questions and all were discussed. Family will contact me after referral next week to discuss how to proceed. Navigation will continue to follow for transition of care to [facility].

## 2020-09-18 NOTE — PROGRESS NOTES
2nd attempt to outreach to patient for [facility] call. Unable to reach patient, no answer. Per CC patient has an appointment with Oncology today. Episode resolved.

## 2020-09-24 PROBLEM — Z98.890 S/P EXPLORATORY LAPAROTOMY: Status: ACTIVE | Noted: 2020-09-24

## 2025-03-23 NOTE — CONSULTS
Comprehensive Nutrition Assessment    Type and Reason for Visit: Initial, Consult(TPN (Genneral surgery))    Nutrition Recommendations/Plan:   1) Initiate TPN: 2L 10%DEX/4.25%AA with no lipids tonight. Regimen to provide: 1020 kcal (~58% needs), 85 g pro (100% needs), 200 g CHO (does not exceed max CHO), 2L fluid (100% needs). Additives per L: 40 meq NaAcetate, 30 meq KPhos, 8 meq Mg  Additives per day: MVI/MTE  2) Nutrition support labs: daily BMP, Mag and Phos MWF, triglycerides in am  3) Nutrition support electrolyte replacement protocol active and placed on MAR  4) Discontinue IFV with start of TPN    Nutrition Assessment:  Patient with PMH of GERD and recent dx of pancreatic cancer metastatic to liver. He is s/p EUS 8/25. Admitted from 9/2-9/6 due to acute blood loss s/p mesnteric arteriogram and emboization 9/3. He presented with dark red blood in yesi. Rapid called due to high volume blood from rectum. He is now s/p emergent exp lap with resection of distal stomach and proximal duedenum, cholecystectomy, and gastrojejunostomy 9/7. Met with patient and wife at bedside. Explained TPN while NPO. Patient has no questions. He is just asking about diet recommendations when he goes home. Explained that it is too early to tell, but can be addressed prior to discharge and also referral to outpatient RD can be made. He states that his usual body weight was ~165# prior to diagnosis. He states that he initially lost ~10#, but has been able to maintain at ~155-157#.   Medications: SSI (no coverage needed), Protonix, Zosyn  IVF: LR at 125ml/hr  Abdomen: distant BS, last BM 9/7  HEARN: 300 ml  Lab Results   Component Value Date/Time    Sodium 146 (H) 09/10/2020 04:01 AM    Potassium 3.6 09/10/2020 04:01 AM    Chloride 113 (H) 09/10/2020 04:01 AM    CO2 26 09/10/2020 04:01 AM    Anion gap 7 09/10/2020 04:01 AM    Glucose 104 (H) 09/10/2020 04:01 AM    BUN 19 09/10/2020 04:01 AM    Creatinine 0.90 09/10/2020 04:01 AM Calcium 8.0 (L) 09/10/2020 04:01 AM    Albumin 1.8 (L) 09/10/2020 04:01 AM   Labs significant for elevated Na that has been trending high, slightly elevated glucose (trending down with NPO status). POC glucose 104-126 last 24 hrs    PN access: left quad lumen IJ    Estimated Daily Nutrient Needs:  Energy (kcal):  0907-1755(62-91 kcal/kg (recent body weight 70.9 kg))  Protein (g):  (1.2-1.5 g/kg (recent body weight 70.9 kg))   Max CHO: 387 g (4mg/kgIBW/min/d)      Fluid (ml/day):  1135-7235(~1 ml/kcal)    Current Nutrition Therapies:   DIET NPO    Anthropometric Measures:  · Height:  5' 7\" (170.2 cm)  · Current Body Wt:  81.4 kg (179 lb 7.3 oz)(bed scale 9/9)   · Usual Body Wt:  165 lb     · Ideal Body Wt:  148 lbs:  121.3 %   · Body mass index is 28.11 kg/m². · BMI Category: Overweight (BMI 25.0-29. 9)     · Review of weight history per outpatient office visits: 6/1 166#, 7/8 159#, 8/31 156#. This reveals a 10# (6%) weight loss in last 3 months. This is not consistent with ASPEN criteria for severe weight loss. Nutrition Diagnosis:   · Inadequate oral intake related to altered GI function as evidenced by (s/p surgery as above, NPO, PN for primary needs)    Nutrition Interventions:   Food and/or Nutrient Delivery: Continue NPO, Start parenteral nutrition  Coordination of Nutrition Care: (Discussed with Carly Qiu RN)    Goals:  Meet at least 75% nutrition needs witiin 7 days       Nutrition Monitoring and Evaluation:   Food/Nutrient Intake Outcomes: Parenteral nutrition intake/tolerance  Physical Signs/Symptoms Outcomes: Biochemical data, GI status    Discharge Planning:     Too soon to determine     Sin Nixon [place], LD on 9/10/2020 at 11:25 AM  Contact: [phone] Fellow

## (undated) DEVICE — SUTURE PERMAHAND SZ 2-0 L12X18IN NONABSORBABLE BLK SILK A185H

## (undated) DEVICE — AGENT HEMSTAT W2XL14IN OXIDIZED REGENERATED CELOS ABSRB FOR

## (undated) DEVICE — MAJOR GENERAL: Brand: MEDLINE INDUSTRIES, INC.

## (undated) DEVICE — RELOAD STPL L75MM OPN H3.8MM CLS 1.5MM WIRE DIA0.2MM REG

## (undated) DEVICE — CONTAINER PREFIL FRMLN 40ML --

## (undated) DEVICE — TOTAL TRAY, DB, 100% SILI FOLEY, 16FR 10: Brand: MEDLINE

## (undated) DEVICE — BLOCK BITE AD 60FR W/ VELC STRP ADDRESSES MOST PT AND

## (undated) DEVICE — SUTURE PDS II SZ 3-0 L27IN ABSRB VLT RB-1 L17MM 1/2 CIR Z305H

## (undated) DEVICE — SOLUTION IV 1000ML 0.9% SOD CHL

## (undated) DEVICE — Device

## (undated) DEVICE — BLADE SURG NO15 S STL STR DISP GLASSVAN

## (undated) DEVICE — BLLN KT O RING ENDOSCP US --

## (undated) DEVICE — STAPLER INT L60MM REG TISS BLU B FRM 8 FIRING 2 ROW AUTO

## (undated) DEVICE — SYR 10ML LUER LOK 1/5ML GRAD --

## (undated) DEVICE — SPONGE LAP 18X18IN STRL -- 5/PK

## (undated) DEVICE — MOUTHPIECE ENDOSCP 20X27MM --

## (undated) DEVICE — KENDALL RADIOLUCENT FOAM MONITORING ELECTRODE RECTANGULAR SHAPE: Brand: KENDALL

## (undated) DEVICE — SUTURE PERMAHAND SZ 3-0 L18IN NONABSORBABLE BLK L26MM SH C013D

## (undated) DEVICE — BLADE ELECTRODE: Brand: EDGE

## (undated) DEVICE — BLADE SCALP SURG BARD-PARK 10 --

## (undated) DEVICE — BUTTON SWITCH PENCIL BLADE ELECTRODE, HOLSTER: Brand: EDGE

## (undated) DEVICE — AIRLIFE™ OXYGEN TUBING 7 FEET (2.1 M) CRUSH RESISTANT OXYGEN TUBING, VINYL TIPPED: Brand: AIRLIFE™

## (undated) DEVICE — SYR LR LCK 1ML GRAD NSAF 30ML --

## (undated) DEVICE — CARDINAL HEALTH FLEXIBLE LIGHT HANDLE COVER: Brand: CARDINAL HEALTH

## (undated) DEVICE — ENDOSCOPIC ULTRASOUND FINE NEEDLE BIOPSY (FNB) DEVICE: Brand: ACQUIRE

## (undated) DEVICE — AMD ANTIMICROBIAL GAUZE SPONGES,12 PLY USP TYPE VII, 0.2% POLYHEXAMETHYLENE BIGUANIDE HCI (PHMB): Brand: CURITY

## (undated) DEVICE — (D)PREP SKN CHLRAPRP APPL 26ML -- CONVERT TO ITEM 371833

## (undated) DEVICE — SUTURE PDS II SZ 1 L96IN ABSRB VLT TP-1 L65MM 1/2 CIR Z880G

## (undated) DEVICE — SUTURE PERMAHAND SZ 2-0 L18IN NONABSORBABLE BLK L26MM PS 1588H

## (undated) DEVICE — FORCEPS BX L240CM JAW DIA2.8MM L CAP W/ NDL MIC MESH TOOTH

## (undated) DEVICE — APPLIER LIG CLP M L11IN TI STR RNG HNDL FOR 20 CLP DISP

## (undated) DEVICE — STAPLER INT L75MM CUT LN L73MM STPL LN L77MM BLU B FRM 8

## (undated) DEVICE — CANNULA NSL ORAL AD FOR CAPNOFLEX CO2 O2 AIRLFE

## (undated) DEVICE — SUTURE PERMAHAND SZ 2-0 L18IN NONABSORBABLE BLK L26MM SH C012D

## (undated) DEVICE — REM POLYHESIVE ADULT PATIENT RETURN ELECTRODE: Brand: VALLEYLAB

## (undated) DEVICE — MEDI-VAC YANKAUER SUCTION HANDLE W/BULBOUS TIP: Brand: CARDINAL HEALTH

## (undated) DEVICE — GARMENT,MEDLINE,DVT,INT,CALF,MED, GEN2: Brand: MEDLINE

## (undated) DEVICE — DRAIN SURG 3/4 W10MMXL20CM 100% SIL PERF FLAT HUBLESS

## (undated) DEVICE — (D)DRAPE SHT LAP T-SHP AURORA

## (undated) DEVICE — CONNECTOR TBNG OD5-7MM O2 END DISP

## (undated) DEVICE — RESERVOIR,SUCTION,100CC,SILICONE: Brand: MEDLINE